# Patient Record
Sex: MALE | Race: WHITE | NOT HISPANIC OR LATINO | Employment: FULL TIME | URBAN - METROPOLITAN AREA
[De-identification: names, ages, dates, MRNs, and addresses within clinical notes are randomized per-mention and may not be internally consistent; named-entity substitution may affect disease eponyms.]

---

## 2017-01-02 ENCOUNTER — APPOINTMENT (OUTPATIENT)
Dept: URGENT CARE | Facility: MEDICAL CENTER | Age: 37
End: 2017-01-02
Payer: COMMERCIAL

## 2017-01-02 PROCEDURE — 99203 OFFICE O/P NEW LOW 30 MIN: CPT

## 2017-01-03 ENCOUNTER — ALLSCRIPTS OFFICE VISIT (OUTPATIENT)
Dept: OTHER | Facility: OTHER | Age: 37
End: 2017-01-03

## 2017-01-03 DIAGNOSIS — Z00.00 ENCOUNTER FOR GENERAL ADULT MEDICAL EXAMINATION WITHOUT ABNORMAL FINDINGS: ICD-10-CM

## 2017-02-17 ENCOUNTER — OFFICE VISIT (OUTPATIENT)
Dept: URGENT CARE | Facility: MEDICAL CENTER | Age: 37
End: 2017-02-17
Payer: COMMERCIAL

## 2017-02-17 PROCEDURE — 99214 OFFICE O/P EST MOD 30 MIN: CPT

## 2017-02-22 ENCOUNTER — ALLSCRIPTS OFFICE VISIT (OUTPATIENT)
Dept: OTHER | Facility: OTHER | Age: 37
End: 2017-02-22

## 2017-02-28 ENCOUNTER — GENERIC CONVERSION - ENCOUNTER (OUTPATIENT)
Dept: OTHER | Facility: OTHER | Age: 37
End: 2017-02-28

## 2017-04-23 ENCOUNTER — OFFICE VISIT (OUTPATIENT)
Dept: URGENT CARE | Facility: MEDICAL CENTER | Age: 37
End: 2017-04-23
Payer: COMMERCIAL

## 2017-04-23 PROCEDURE — S9083 URGENT CARE CENTER GLOBAL: HCPCS

## 2017-04-23 PROCEDURE — G0382 LEV 3 HOSP TYPE B ED VISIT: HCPCS

## 2017-09-17 ENCOUNTER — HOSPITAL ENCOUNTER (EMERGENCY)
Facility: HOSPITAL | Age: 37
Discharge: HOME | End: 2017-09-17
Attending: EMERGENCY MEDICINE | Admitting: EMERGENCY MEDICINE
Payer: COMMERCIAL

## 2017-09-17 VITALS
HEART RATE: 81 BPM | SYSTOLIC BLOOD PRESSURE: 131 MMHG | WEIGHT: 207 LBS | TEMPERATURE: 97.9 F | RESPIRATION RATE: 18 BRPM | DIASTOLIC BLOOD PRESSURE: 77 MMHG | OXYGEN SATURATION: 99 %

## 2017-09-17 DIAGNOSIS — N20.0 KIDNEY STONE: Primary | ICD-10-CM

## 2017-09-17 LAB
BACTERIA UR QL AUTO: ABNORMAL /HPF
BILIRUB UR QL STRIP: NEGATIVE
CLARITY UR: ABNORMAL
COLOR UR: YELLOW
GLUCOSE UR STRIP-MCNC: NEGATIVE MG/DL
HGB UR QL STRIP.AUTO: ABNORMAL
KETONES UR STRIP-MCNC: NEGATIVE MG/DL
LEUKOCYTE ESTERASE UR QL STRIP: NEGATIVE
NITRITE UR QL STRIP: NEGATIVE
NON-SQ EPI CELLS URNS QL MICRO: ABNORMAL /HPF
PH UR STRIP.AUTO: 6.5 [PH] (ref 4.5–8)
PROT UR STRIP-MCNC: NEGATIVE MG/DL
RBC #/AREA URNS AUTO: ABNORMAL /HPF
SP GR UR STRIP.AUTO: 1.01 (ref 1–1.03)
UROBILINOGEN UR QL STRIP.AUTO: 0.2 E.U./DL
WBC #/AREA URNS AUTO: ABNORMAL /HPF

## 2017-09-17 PROCEDURE — 81001 URINALYSIS AUTO W/SCOPE: CPT

## 2017-09-17 PROCEDURE — 81002 URINALYSIS NONAUTO W/O SCOPE: CPT | Performed by: EMERGENCY MEDICINE

## 2017-09-17 PROCEDURE — 99284 EMERGENCY DEPT VISIT MOD MDM: CPT

## 2017-09-17 RX ORDER — IBUPROFEN 400 MG/1
400 TABLET ORAL ONCE
Status: COMPLETED | OUTPATIENT
Start: 2017-09-17 | End: 2017-09-17

## 2017-09-17 RX ADMIN — IBUPROFEN 400 MG: 400 TABLET, FILM COATED ORAL at 10:29

## 2017-09-18 ENCOUNTER — GENERIC CONVERSION - ENCOUNTER (OUTPATIENT)
Dept: OTHER | Facility: OTHER | Age: 37
End: 2017-09-18

## 2017-09-18 ENCOUNTER — ALLSCRIPTS OFFICE VISIT (OUTPATIENT)
Dept: OTHER | Facility: OTHER | Age: 37
End: 2017-09-18

## 2017-09-18 ENCOUNTER — LAB REQUISITION (OUTPATIENT)
Dept: LAB | Facility: HOSPITAL | Age: 37
End: 2017-09-18
Payer: COMMERCIAL

## 2017-09-18 DIAGNOSIS — N20.0 CALCULUS OF KIDNEY: ICD-10-CM

## 2017-09-18 LAB
BILIRUB UR QL STRIP: NORMAL
CLARITY UR: NORMAL
COLOR UR: YELLOW
GLUCOSE (HISTORICAL): NORMAL
HGB UR QL STRIP.AUTO: NORMAL
KETONES UR STRIP-MCNC: NORMAL MG/DL
LEUKOCYTE ESTERASE UR QL STRIP: NORMAL
NITRITE UR QL STRIP: NORMAL
PH UR STRIP.AUTO: 7.5 [PH]
PROT UR STRIP-MCNC: NORMAL MG/DL
SP GR UR STRIP.AUTO: 1015
UROBILINOGEN UR QL STRIP.AUTO: 0.2

## 2017-09-18 PROCEDURE — 82360 CALCULUS ASSAY QUANT: CPT | Performed by: NURSE PRACTITIONER

## 2017-09-25 ENCOUNTER — APPOINTMENT (OUTPATIENT)
Dept: LAB | Facility: MEDICAL CENTER | Age: 37
End: 2017-09-25
Payer: COMMERCIAL

## 2017-09-25 ENCOUNTER — TRANSCRIBE ORDERS (OUTPATIENT)
Dept: ADMINISTRATIVE | Facility: HOSPITAL | Age: 37
End: 2017-09-25

## 2017-09-25 DIAGNOSIS — N20.0 CALCULUS OF KIDNEY: ICD-10-CM

## 2017-09-25 PROCEDURE — 82507 ASSAY OF CITRATE: CPT

## 2017-09-25 PROCEDURE — 84300 ASSAY OF URINE SODIUM: CPT

## 2017-09-25 PROCEDURE — 82131 AMINO ACIDS SINGLE QUANT: CPT

## 2017-09-25 PROCEDURE — 84560 ASSAY OF URINE/URIC ACID: CPT

## 2017-09-25 PROCEDURE — 82140 ASSAY OF AMMONIA: CPT

## 2017-09-25 PROCEDURE — 84133 ASSAY OF URINE POTASSIUM: CPT

## 2017-09-25 PROCEDURE — 82340 ASSAY OF CALCIUM IN URINE: CPT

## 2017-09-25 PROCEDURE — 84105 ASSAY OF URINE PHOSPHORUS: CPT

## 2017-09-25 PROCEDURE — 83735 ASSAY OF MAGNESIUM: CPT

## 2017-09-25 PROCEDURE — 83945 ASSAY OF OXALATE: CPT

## 2017-09-25 PROCEDURE — 82570 ASSAY OF URINE CREATININE: CPT

## 2017-09-25 PROCEDURE — 81003 URINALYSIS AUTO W/O SCOPE: CPT

## 2017-09-25 PROCEDURE — 83935 ASSAY OF URINE OSMOLALITY: CPT

## 2017-09-25 PROCEDURE — 82436 ASSAY OF URINE CHLORIDE: CPT

## 2017-09-25 PROCEDURE — 84392 ASSAY OF URINE SULFATE: CPT

## 2017-09-30 LAB
CA PHOS MFR STONE: 3 %
COLOR STONE: NORMAL
COM MFR STONE: 97 %
COMMENT-STONE3: NORMAL
COMPOSITION: NORMAL
LABORATORY COMMENT REPORT: NORMAL
NIDUS STONE QL: NORMAL
PHOTO: NORMAL
SIZE STONE: NORMAL MM
STONE ANALYSIS-IMP: NORMAL
WT STONE: 20 MG

## 2017-10-05 LAB
AMMONIA 24H UR-MRATE: 15 MEQ/24 HR
AMMONIA UR-SCNC: ABNORMAL UG/DL
CA H2 PHOS DIHYD CRY URNS QL MICRO: 0.21 RATIO (ref 0–3)
CALCIUM 24H UR-MCNC: 2.6 MG/DL
CALCIUM 24H UR-MRATE: 55.9 MG/24 HR (ref 100–300)
CHLORIDE 24H UR-SCNC: <20 MMOL/L
CHLORIDE 24H UR-SRATE: <43 MMOL/24 HR (ref 110–250)
CITRATE 24H UR-MCNC: 93 MG/L
CITRATE 24H UR-MRATE: 200 MG/24 HR (ref 320–1240)
COM CRY STONE QL IR: 1.38 RATIO (ref 0–6)
CREAT 24H UR-MCNC: 39.1 MG/DL
CREAT 24H UR-MRATE: 840.7 MG/24 HR (ref 1000–2000)
CYSTINE 24H UR-MCNC: 3.25 MG/L
CYSTINE 24H UR-MRATE: 6.99 MG/24 HR (ref 10–100)
MAGNESIUM 24H UR-MRATE: 45 MG/24 HR (ref 12–293)
MAGNESIUM UR-MCNC: 2.1 MG/DL
NA URATE CRY STONE QL IR: 0.36 RATIO (ref 0–4)
OSMOLALITY UR: 176 MOSMOL/KG (ref 300–900)
OXALATE 24H UR-MRATE: 19 MG/24 HR (ref 7–44)
OXALATE UR-MCNC: 9 MG/L
PH 24H UR: 6.3 [PH]
PHOSPHATE 24H UR-MCNC: 14.5 MG/DL
PHOSPHATE 24H UR-MRATE: 311.8 MG/24 HR (ref 400–1300)
PLEASE NOTE (STONE RISK): ABNORMAL
POTASSIUM 24H UR-SCNC: 40 MMOL/24 HR (ref 25–125)
POTASSIUM UR-SCNC: 18.6 MMOL/L
PRESERVED URINE: 2150 ML/24 HR (ref 800–1800)
SODIUM 24H UR-SCNC: <20 MMOL/L
SODIUM 24H UR-SRATE: <43 MMOL/24 HR (ref 58–337)
SPECIMEN VOL 24H UR: 2150 ML/24 HR (ref 800–1800)
SULFATE 24H UR-MCNC: 15 MEQ/24 HR (ref 0–30)
SULFATE UR-MCNC: 7 MEQ/L
TRI-PHOS CRY STONE MICRO: 0.01 RATIO (ref 0–1)
URATE 24H UR-MCNC: 15.9 MG/DL
URATE 24H UR-MRATE: 342 MG/24 HR (ref 250–750)
URATE DIHYD CRY STONE QL IR: 0.36 RATIO (ref 0–1.2)

## 2017-11-01 ENCOUNTER — ALLSCRIPTS OFFICE VISIT (OUTPATIENT)
Dept: OTHER | Facility: OTHER | Age: 37
End: 2017-11-01

## 2017-11-01 LAB
BILIRUB UR QL STRIP: NORMAL
CLARITY UR: NORMAL
COLOR UR: YELLOW
GLUCOSE (HISTORICAL): NORMAL
HGB UR QL STRIP.AUTO: NORMAL
KETONES UR STRIP-MCNC: NORMAL MG/DL
LEUKOCYTE ESTERASE UR QL STRIP: NORMAL
NITRITE UR QL STRIP: NORMAL
PH UR STRIP.AUTO: 7 [PH]
PROT UR STRIP-MCNC: NORMAL MG/DL
SP GR UR STRIP.AUTO: 1.01
UROBILINOGEN UR QL STRIP.AUTO: 0.2

## 2018-01-12 ENCOUNTER — ALLSCRIPTS OFFICE VISIT (OUTPATIENT)
Dept: OTHER | Facility: OTHER | Age: 38
End: 2018-01-12

## 2018-01-12 ENCOUNTER — GENERIC CONVERSION - ENCOUNTER (OUTPATIENT)
Dept: OTHER | Facility: OTHER | Age: 38
End: 2018-01-12

## 2018-01-12 ENCOUNTER — LAB REQUISITION (OUTPATIENT)
Dept: LAB | Facility: HOSPITAL | Age: 38
End: 2018-01-12
Payer: COMMERCIAL

## 2018-01-12 DIAGNOSIS — Z00.00 ENCOUNTER FOR GENERAL ADULT MEDICAL EXAMINATION WITHOUT ABNORMAL FINDINGS: ICD-10-CM

## 2018-01-12 DIAGNOSIS — E78.5 HYPERLIPIDEMIA: ICD-10-CM

## 2018-01-12 LAB
ALBUMIN SERPL BCP-MCNC: 4.7 G/DL (ref 3.5–5)
ALP SERPL-CCNC: 79 U/L (ref 46–116)
ALT SERPL W P-5'-P-CCNC: 58 U/L (ref 12–78)
ANION GAP SERPL CALCULATED.3IONS-SCNC: 9 MMOL/L (ref 4–13)
AST SERPL W P-5'-P-CCNC: 29 U/L (ref 5–45)
BASOPHILS # BLD AUTO: 0.02 THOUSANDS/ΜL (ref 0–0.1)
BASOPHILS NFR BLD AUTO: 0 % (ref 0–1)
BILIRUB SERPL-MCNC: 0.85 MG/DL (ref 0.2–1)
BUN SERPL-MCNC: 14 MG/DL (ref 5–25)
CALCIUM SERPL-MCNC: 9.6 MG/DL (ref 8.3–10.1)
CHLORIDE SERPL-SCNC: 102 MMOL/L (ref 100–108)
CHOLEST SERPL-MCNC: 217 MG/DL (ref 50–200)
CO2 SERPL-SCNC: 26 MMOL/L (ref 21–32)
CREAT SERPL-MCNC: 0.9 MG/DL (ref 0.6–1.3)
EOSINOPHIL # BLD AUTO: 0.1 THOUSAND/ΜL (ref 0–0.61)
EOSINOPHIL NFR BLD AUTO: 2 % (ref 0–6)
ERYTHROCYTE [DISTWIDTH] IN BLOOD BY AUTOMATED COUNT: 12.7 % (ref 11.6–15.1)
GFR SERPL CREATININE-BSD FRML MDRD: 109 ML/MIN/1.73SQ M
GLUCOSE SERPL-MCNC: 76 MG/DL (ref 65–140)
HCT VFR BLD AUTO: 47.5 % (ref 36.5–49.3)
HDLC SERPL-MCNC: 51 MG/DL (ref 40–60)
HGB BLD-MCNC: 16.5 G/DL (ref 12–17)
LDLC SERPL CALC-MCNC: 135 MG/DL (ref 0–100)
LYMPHOCYTES # BLD AUTO: 2.12 THOUSANDS/ΜL (ref 0.6–4.47)
LYMPHOCYTES NFR BLD AUTO: 33 % (ref 14–44)
MCH RBC QN AUTO: 29.9 PG (ref 26.8–34.3)
MCHC RBC AUTO-ENTMCNC: 34.7 G/DL (ref 31.4–37.4)
MCV RBC AUTO: 86 FL (ref 82–98)
MONOCYTES # BLD AUTO: 0.62 THOUSAND/ΜL (ref 0.17–1.22)
MONOCYTES NFR BLD AUTO: 10 % (ref 4–12)
NEUTROPHILS # BLD AUTO: 3.54 THOUSANDS/ΜL (ref 1.85–7.62)
NEUTS SEG NFR BLD AUTO: 55 % (ref 43–75)
NRBC BLD AUTO-RTO: 0 /100 WBCS
PLATELET # BLD AUTO: 295 THOUSANDS/UL (ref 149–390)
PMV BLD AUTO: 11.3 FL (ref 8.9–12.7)
POTASSIUM SERPL-SCNC: 4.5 MMOL/L (ref 3.5–5.3)
PROT SERPL-MCNC: 7.6 G/DL (ref 6.4–8.2)
RBC # BLD AUTO: 5.51 MILLION/UL (ref 3.88–5.62)
SODIUM SERPL-SCNC: 137 MMOL/L (ref 136–145)
TRIGL SERPL-MCNC: 154 MG/DL
TSH SERPL DL<=0.05 MIU/L-ACNC: 1.5 UIU/ML (ref 0.36–3.74)
WBC # BLD AUTO: 6.41 THOUSAND/UL (ref 4.31–10.16)

## 2018-01-12 PROCEDURE — 80053 COMPREHEN METABOLIC PANEL: CPT | Performed by: FAMILY MEDICINE

## 2018-01-12 PROCEDURE — 84443 ASSAY THYROID STIM HORMONE: CPT | Performed by: FAMILY MEDICINE

## 2018-01-12 PROCEDURE — 80061 LIPID PANEL: CPT | Performed by: FAMILY MEDICINE

## 2018-01-12 PROCEDURE — 85025 COMPLETE CBC W/AUTO DIFF WBC: CPT | Performed by: FAMILY MEDICINE

## 2018-01-13 ENCOUNTER — GENERIC CONVERSION - ENCOUNTER (OUTPATIENT)
Dept: OTHER | Facility: OTHER | Age: 38
End: 2018-01-13

## 2018-01-13 VITALS
SYSTOLIC BLOOD PRESSURE: 122 MMHG | HEART RATE: 76 BPM | RESPIRATION RATE: 16 BRPM | BODY MASS INDEX: 31.5 KG/M2 | TEMPERATURE: 97.7 F | WEIGHT: 220 LBS | HEIGHT: 70 IN | DIASTOLIC BLOOD PRESSURE: 84 MMHG

## 2018-01-13 VITALS
SYSTOLIC BLOOD PRESSURE: 110 MMHG | DIASTOLIC BLOOD PRESSURE: 70 MMHG | HEIGHT: 74 IN | BODY MASS INDEX: 26.56 KG/M2 | WEIGHT: 207 LBS

## 2018-01-14 VITALS
WEIGHT: 208 LBS | SYSTOLIC BLOOD PRESSURE: 110 MMHG | HEIGHT: 74 IN | BODY MASS INDEX: 26.69 KG/M2 | DIASTOLIC BLOOD PRESSURE: 76 MMHG

## 2018-01-15 VITALS
RESPIRATION RATE: 16 BRPM | BODY MASS INDEX: 30.92 KG/M2 | DIASTOLIC BLOOD PRESSURE: 82 MMHG | TEMPERATURE: 97.3 F | HEIGHT: 70 IN | SYSTOLIC BLOOD PRESSURE: 122 MMHG | WEIGHT: 216 LBS | HEART RATE: 92 BPM

## 2018-01-16 NOTE — MISCELLANEOUS
Message   Recorded as Task   Date: 09/18/2017 08:16 AM, Created By: Albin Thomas   Task Name: Medical Complaint Callback   Assigned To: Ernesto CARPENTER,TEAM   Regarding Patient: Xuan Pan, Status: In Progress   Comment:    Yamilka Coleman - 18 Sep 2017 8:16 AM     TASK CREATED  Caller: Self; [phone] (Home); [phone] (Work)  Pt seen 09/17/17 [facility] ER Victor stone pain calling for appointment   Sharonda Stanley - 18 Sep 2017 8:36 AM     TASK EDITED  LMOM FOR PT TO RETURN CALL  NEEDS APPT WITH LORENZO WAGNER TODAY  Sharonda Stanley - 18 Sep 2017 8:37 AM     TASK IN PROGRESS   Sharonda Stanley - 18 Sep 2017 9:13 AM     TASK EDITED  PT PAIN FREE AT THIS TIME  PASSED STONE IN ER  APPT TODAY WITH LORENZO WAGNER FOR ER F/U  Active Problems    1  Acute bronchitis (466 0) (J20 9)   2  Acute pharyngitis (462) (J02 9)   3  Acute suppurative otitis media (382 00) (H66 009)   4  Contact dermatitis (692 9) (L25 9)   5  Cough (786 2) (R05)   6  Viral disease (079 99) (B34 9)    Current Meds   1  No Reported Medications  Requested for: 23Apr2017 Recorded    Allergies    1   No Known Drug Allergies    Signatures   Electronically signed by : Oriana Lange RN; Sep 18 2017  9:14AM EST                       (Author)

## 2018-01-24 VITALS — BODY MASS INDEX: 32.29 KG/M2 | HEIGHT: 69 IN | WEIGHT: 218 LBS | TEMPERATURE: 97.7 F

## 2018-01-24 VITALS — HEART RATE: 72 BPM | DIASTOLIC BLOOD PRESSURE: 80 MMHG | RESPIRATION RATE: 16 BRPM | SYSTOLIC BLOOD PRESSURE: 118 MMHG

## 2018-02-22 ENCOUNTER — ANESTHESIA EVENT (OUTPATIENT)
Dept: PERIOP | Facility: HOSPITAL | Age: 38
End: 2018-02-22
Payer: COMMERCIAL

## 2018-02-23 ENCOUNTER — HOSPITAL ENCOUNTER (OUTPATIENT)
Facility: HOSPITAL | Age: 38
Setting detail: OUTPATIENT SURGERY
Discharge: HOME | End: 2018-02-23
Attending: PLASTIC SURGERY | Admitting: PLASTIC SURGERY
Payer: COMMERCIAL

## 2018-02-23 ENCOUNTER — ANESTHESIA (OUTPATIENT)
Dept: PERIOP | Facility: HOSPITAL | Age: 38
End: 2018-02-23
Payer: COMMERCIAL

## 2018-02-23 VITALS
DIASTOLIC BLOOD PRESSURE: 57 MMHG | OXYGEN SATURATION: 100 % | TEMPERATURE: 98.5 F | RESPIRATION RATE: 15 BRPM | HEIGHT: 70 IN | HEART RATE: 78 BPM | BODY MASS INDEX: 30.06 KG/M2 | WEIGHT: 210 LBS | SYSTOLIC BLOOD PRESSURE: 155 MMHG

## 2018-02-23 DIAGNOSIS — R22.9 LOCALIZED SWELLING, MASS AND LUMP, UNSPECIFIED: ICD-10-CM

## 2018-02-23 PROCEDURE — 88304 TISSUE EXAM BY PATHOLOGIST: CPT | Performed by: PATHOLOGY

## 2018-02-23 PROCEDURE — 88304 TISSUE EXAM BY PATHOLOGIST: CPT | Performed by: PLASTIC SURGERY

## 2018-02-23 RX ORDER — LIDOCAINE HYDROCHLORIDE AND EPINEPHRINE 10; 10 MG/ML; UG/ML
INJECTION, SOLUTION INFILTRATION; PERINEURAL AS NEEDED
Status: DISCONTINUED | OUTPATIENT
Start: 2018-02-23 | End: 2018-02-23 | Stop reason: HOSPADM

## 2018-02-23 RX ORDER — BUPIVACAINE HYDROCHLORIDE AND EPINEPHRINE 2.5; 5 MG/ML; UG/ML
INJECTION, SOLUTION EPIDURAL; INFILTRATION; INTRACAUDAL; PERINEURAL AS NEEDED
Status: DISCONTINUED | OUTPATIENT
Start: 2018-02-23 | End: 2018-02-23 | Stop reason: HOSPADM

## 2018-02-23 RX ORDER — ONDANSETRON 2 MG/ML
INJECTION INTRAMUSCULAR; INTRAVENOUS AS NEEDED
Status: DISCONTINUED | OUTPATIENT
Start: 2018-02-23 | End: 2018-02-23 | Stop reason: SURG

## 2018-02-23 RX ORDER — FENTANYL CITRATE/PF 50 MCG/ML
50 SYRINGE (ML) INJECTION
Status: DISCONTINUED | OUTPATIENT
Start: 2018-02-23 | End: 2018-02-23 | Stop reason: HOSPADM

## 2018-02-23 RX ORDER — MEPERIDINE HYDROCHLORIDE 50 MG/ML
12.5 INJECTION INTRAMUSCULAR; INTRAVENOUS; SUBCUTANEOUS AS NEEDED
Status: DISCONTINUED | OUTPATIENT
Start: 2018-02-23 | End: 2018-02-23 | Stop reason: HOSPADM

## 2018-02-23 RX ORDER — AMOXICILLIN 500 MG
1 CAPSULE ORAL DAILY
COMMUNITY
End: 2018-02-23 | Stop reason: HOSPADM

## 2018-02-23 RX ORDER — SODIUM CHLORIDE 9 MG/ML
125 INJECTION, SOLUTION INTRAVENOUS CONTINUOUS
Status: DISCONTINUED | OUTPATIENT
Start: 2018-02-23 | End: 2018-02-23 | Stop reason: HOSPADM

## 2018-02-23 RX ORDER — MAGNESIUM HYDROXIDE 1200 MG/15ML
LIQUID ORAL AS NEEDED
Status: DISCONTINUED | OUTPATIENT
Start: 2018-02-23 | End: 2018-02-23 | Stop reason: HOSPADM

## 2018-02-23 RX ORDER — HYDROCODONE BITARTRATE AND ACETAMINOPHEN 5; 325 MG/1; MG/1
1 TABLET ORAL EVERY 4 HOURS PRN
Status: DISCONTINUED | OUTPATIENT
Start: 2018-02-23 | End: 2018-02-23 | Stop reason: HOSPADM

## 2018-02-23 RX ORDER — FENTANYL CITRATE 50 UG/ML
INJECTION, SOLUTION INTRAMUSCULAR; INTRAVENOUS AS NEEDED
Status: DISCONTINUED | OUTPATIENT
Start: 2018-02-23 | End: 2018-02-23 | Stop reason: SURG

## 2018-02-23 RX ORDER — MIDAZOLAM HYDROCHLORIDE 1 MG/ML
INJECTION INTRAMUSCULAR; INTRAVENOUS AS NEEDED
Status: DISCONTINUED | OUTPATIENT
Start: 2018-02-23 | End: 2018-02-23 | Stop reason: SURG

## 2018-02-23 RX ORDER — ALBUTEROL SULFATE 2.5 MG/3ML
2.5 SOLUTION RESPIRATORY (INHALATION) ONCE AS NEEDED
Status: DISCONTINUED | OUTPATIENT
Start: 2018-02-23 | End: 2018-02-23 | Stop reason: HOSPADM

## 2018-02-23 RX ORDER — MULTIVITAMIN
1 TABLET ORAL DAILY
COMMUNITY

## 2018-02-23 RX ORDER — SCOLOPAMINE TRANSDERMAL SYSTEM 1 MG/1
1 PATCH, EXTENDED RELEASE TRANSDERMAL
Status: DISCONTINUED | OUTPATIENT
Start: 2018-02-23 | End: 2018-02-23 | Stop reason: HOSPADM

## 2018-02-23 RX ORDER — PROPOFOL 10 MG/ML
INJECTION, EMULSION INTRAVENOUS CONTINUOUS PRN
Status: DISCONTINUED | OUTPATIENT
Start: 2018-02-23 | End: 2018-02-23 | Stop reason: SURG

## 2018-02-23 RX ORDER — PROPOFOL 10 MG/ML
INJECTION, EMULSION INTRAVENOUS AS NEEDED
Status: DISCONTINUED | OUTPATIENT
Start: 2018-02-23 | End: 2018-02-23 | Stop reason: SURG

## 2018-02-23 RX ADMIN — PROPOFOL 40 MG: 10 INJECTION, EMULSION INTRAVENOUS at 13:07

## 2018-02-23 RX ADMIN — MIDAZOLAM HYDROCHLORIDE 2 MG: 1 INJECTION, SOLUTION INTRAMUSCULAR; INTRAVENOUS at 13:04

## 2018-02-23 RX ADMIN — PROPOFOL 50 MCG/KG/MIN: 10 INJECTION, EMULSION INTRAVENOUS at 13:07

## 2018-02-23 RX ADMIN — SODIUM CHLORIDE: 0.9 INJECTION, SOLUTION INTRAVENOUS at 13:21

## 2018-02-23 RX ADMIN — MIDAZOLAM HYDROCHLORIDE 2 MG: 1 INJECTION, SOLUTION INTRAMUSCULAR; INTRAVENOUS at 12:59

## 2018-02-23 RX ADMIN — CEFAZOLIN SODIUM 2000 MG: 1 SOLUTION INTRAVENOUS at 13:09

## 2018-02-23 RX ADMIN — SODIUM CHLORIDE 125 ML/HR: 0.9 INJECTION, SOLUTION INTRAVENOUS at 12:21

## 2018-02-23 RX ADMIN — ONDANSETRON HYDROCHLORIDE 4 MG: 2 INJECTION, SOLUTION INTRAVENOUS at 13:18

## 2018-02-23 RX ADMIN — FENTANYL CITRATE 100 MCG: 50 INJECTION, SOLUTION INTRAMUSCULAR; INTRAVENOUS at 13:04

## 2018-02-23 RX ADMIN — SCOPALAMINE 1 PATCH: 1 PATCH, EXTENDED RELEASE TRANSDERMAL at 12:06

## 2018-02-23 NOTE — PROGRESS NOTES
D/C instructions reviewed w/ pt & spouse, verbalized understanding  Small incision at L base of scalp remain CD&I, closed w/ hystocryl & KIERA  Pt given gauze for home use, to wear headband at night w/ gauze  Denies pain, slight pressure when ambulated but tolerable

## 2018-02-23 NOTE — DISCHARGE SUMMARY
Discharge Summary - Medical Walker Frank 40 y o  male MRN: [id number]    [address] [address] [facility] OR MAIN Room / Bed: OR POOL/OR POOL Encounter: [id number]    BRIEF OVERVIEW  Admitting Provider: Celestine Burden MD  Discharge Provider: Celestine Burden MD  Primary Care Physician at Discharge: Hermilo Pizarro    Discharge To: Home      Admission Date: 2/23/2018     Discharge Date: No discharge date for patient encounter  Code Status: No Order  Advance Directive and Living Will: <no information>  Power of :        Primary Discharge Diagnosis  Active Problems:    * No active hospital problems  *  Resolved Problems:    * No resolved hospital problems   *        Discharge Disposition: [address] [place] [address]    Presenting Problem/History of Present Illness  <principal problem not specified>      Discharge Condition: stable    Patient tolerated the procedure well, recovered in PACU and was discharged home in stable condition    Celestine Burden MD  2/23/2018  1:05 PM

## 2018-02-23 NOTE — ANESTHESIA PREPROCEDURE EVALUATION
Review of Systems/Medical History      History of anesthetic complications PONV    Cardiovascular  Exercise tolerance: good,     Pulmonary  Negative pulmonary ROS Sleep apnea (not formally diagnosed) ,        GI/Hepatic  Negative GI/hepatic ROS          Negative  ROS        Endo/Other  Negative endo/other ROS      GYN  Negative gynecology ROS          Hematology  Negative hematology ROS      Musculoskeletal  Negative musculoskeletal ROS        Neurology  Negative neurology ROS      Psychology   Negative psychology ROS              Physical Exam    Airway    Mallampati score: II  TM Distance: >3 FB  Neck ROM: full     Dental   No notable dental hx     Cardiovascular  Rhythm: regular, Rate: normal,     Pulmonary  Breath sounds clear to auscultation,     Other Findings  Full beard      Anesthesia Plan  ASA Score- 2     Anesthesia Type- IV sedation with anesthesia and general with ASA Monitors  Additional Monitors:   Airway Plan:         Plan Factors-    Induction- intravenous  Postoperative Plan-     Informed Consent- Anesthetic plan and risks discussed with patient

## 2018-02-23 NOTE — OP NOTE
OPERATIVE REPORT  PATIENT NAME: Xena Rosen    :  1980  MRN: [id number]  Pt Location: [facility] OR ROOM 05    SURGERY DATE: 2018    Surgeon(s) and Role:     Armand Lauren MD - Primary    Preop Diagnosis:  Localized swelling, mass and lump, unspecified [R22 9]    Post-Op Diagnosis Codes:     * Localized swelling, mass and lump, unspecified [R22 9]    Procedure(s) (LRB):  EXCISION SCALP MASS (N/A)    Specimen(s):  ID Type Source Tests Collected by Time Destination   1 : Scalp mass Tissue Mass TISSUE Lore Stevenson MD 2018 1332        Estimated Blood Loss:   Minimal    Drains:       Anesthesia Type:   IV Sedation with Anesthesia    Operative Indications:  Localized swelling, mass and lump, unspecified [R22 9]      Operative Findings:      Complications:   None    Procedure and Technique:  The patient was marked and the area of concern was confirmed with the patient in the preoperative holding area prior to anesthesia  The patient brought to the operating room and placed prone on the operating table  Time-out procedure was performed SCDs were applied and IV antibiotics were given  After adequate anesthesia was obtained the posterior scalp was prepped and draped in standard surgical technique  A transverse incision was made over the mass after 1% lidocaine with epinephrine was injected  Dissection was carried down deep to the galea at which point a fatty mass was noted  This was circumferentially dissected free and sent for final pathology  The mass measured 2 5 cm  The total incision length was 3 3 cm  Excellent hemostasis was achieved  The galea and deep dermis were closed using 4-0 PDS suture in interrupted technique  The superficial skin was closed using 4-0 Monocryl suture running subcuticular technique  Skin glue was applied     I was present for the entire procedure and A qualified resident physician was not available    Patient Disposition:  hemodynamically stable    SIGNATURE: Mehreen Rodriguez  MD  DATE: February 23, 2018  TIME: 3:33 PM

## 2018-02-23 NOTE — DISCHARGE INSTRUCTIONS
[address], [address] [facility], [address] [facility], [facility], [address] [facility] /I / [employer]       Avoid direct sunlight    No ice or heating pack    Ok to shower, avoid direct water pressure on incision    Wear a headband/sweat to hold gauze over the incision at night for 1 week    No strenuous activity    Skin glue was applied     Call [phone] for an appointment in 7-10 days

## 2018-02-23 NOTE — ANESTHESIA POSTPROCEDURE EVALUATION
Post-Op Assessment Note      CV Status:  Stable    Mental Status:  Alert and awake    Hydration Status:  Euvolemic    PONV Controlled:  Controlled    Airway Patency:  Patent    Post Op Vitals Reviewed: Yes          Staff: Anesthesiologist           /69 (02/23/18 1352)    Temp 98 3 °F (36 8 °C) (02/23/18 1352)    Pulse 88 (02/23/18 1352)   Resp 13 (02/23/18 1352)    SpO2 99 % (02/23/18 1352)

## 2018-02-26 ENCOUNTER — OFFICE VISIT (OUTPATIENT)
Dept: FAMILY MEDICINE CLINIC | Facility: CLINIC | Age: 38
End: 2018-02-26
Payer: COMMERCIAL

## 2018-02-26 VITALS
RESPIRATION RATE: 16 BRPM | WEIGHT: 215 LBS | HEART RATE: 84 BPM | TEMPERATURE: 98.3 F | BODY MASS INDEX: 30.78 KG/M2 | DIASTOLIC BLOOD PRESSURE: 86 MMHG | HEIGHT: 70 IN | SYSTOLIC BLOOD PRESSURE: 120 MMHG

## 2018-02-26 DIAGNOSIS — J06.9 UPPER RESPIRATORY TRACT INFECTION, UNSPECIFIED TYPE: Primary | ICD-10-CM

## 2018-02-26 PROCEDURE — 99213 OFFICE O/P EST LOW 20 MIN: CPT | Performed by: FAMILY MEDICINE

## 2018-02-26 RX ORDER — CALCIUM CARBONATE/VITAMIN D3 600 MG-10
TABLET ORAL
COMMUNITY

## 2018-02-26 RX ORDER — BENZONATATE 200 MG/1
200 CAPSULE ORAL 3 TIMES DAILY PRN
Qty: 20 CAPSULE | Refills: 0 | Status: SHIPPED | OUTPATIENT
Start: 2018-02-26 | End: 2018-08-31

## 2018-02-26 NOTE — PATIENT INSTRUCTIONS
Finish cephalexin as prescribed  Will tried Tessalon Perles 200 mg 1 3 times a day as needed for cough  Recommend increase fluids and rest   Return to the office in 1 week or call sooner as needed

## 2018-02-26 NOTE — PROGRESS NOTES
Assessment/Plan:  Discussed treatment options with patient  Patient will continue cephalexin 500 mg q i d  and complete 10 day course  Patient will try Tessalon Perles 200 mg 1 t i d  p r n  Dara Soulier Patient encouraged to drink plenty of fluids and rest   Return to the office in 1 week or sooner p r n  No problem-specific Assessment & Plan notes found for this encounter  Diagnoses and all orders for this visit:    Upper respiratory tract infection, unspecified type  Comments:  Finish cephalexin  Will try Tessalon Perles 200 mg t i d  p r n   Increased fluids and rest   Orders:  -     benzonatate (TESSALON) 200 MG capsule; Take 1 capsule (200 mg total) by mouth 3 (three) times a day as needed for cough    Other orders  -     Omega 3 1200 MG CAPS; Take by mouth  -     CEPHALEXIN PO; Take by mouth 4 (four) times a day          Subjective:      Patient ID: Charmayne Houston is a 40 y o  male  Patient started 3 days ago with nasal congestion, postnasal drainage, sore throat and nonproductive cough  Patient denies any fever  Patient was started on cephalexin 500 mg q i d  3 days ago by Dr Jeremiah Membreno, plastic surgeon secondary to excision of subcutaneous mass from his scalp  Patient has been taking Delsym without significant relief  Patient did receive yearly flu vaccine  Cough   This is a new problem  The current episode started in the past 7 days  The problem has been gradually worsening  The cough is non-productive  Associated symptoms include myalgias, nasal congestion, postnasal drip, a sore throat, sweats and wheezing  Pertinent negatives include no chest pain, chills, ear congestion, ear pain, fever, headaches, hemoptysis, rash, rhinorrhea or shortness of breath  He has tried OTC cough suppressant for the symptoms  The treatment provided mild relief  There is no history of asthma, COPD or pneumonia         The following portions of the patient's history were reviewed and updated as appropriate: allergies, current medications, past family history, past medical history, past social history, past surgical history and problem list     Review of Systems   Constitutional: Negative for chills and fever  HENT: Positive for postnasal drip and sore throat  Negative for ear pain and rhinorrhea  Respiratory: Positive for cough and wheezing  Negative for hemoptysis and shortness of breath  Cardiovascular: Negative for chest pain  Musculoskeletal: Positive for myalgias  Skin: Negative for rash  Neurological: Negative for headaches  Objective:      /86   Pulse 84   Temp 98 3 °F (36 8 °C)   Resp 16   Ht 5' 10" (1 778 m)   Wt 97 5 kg (215 lb)   BMI 30 85 kg/m²          Physical Exam   Constitutional: He is oriented to person, place, and time  He appears well-developed and well-nourished  No distress  HENT:   Head: Normocephalic  Right Ear: External ear normal    Left Ear: External ear normal    Positive turbinates swelling with mucoid drainage  Throat positive postnasal drainage and injected  Mucous membranes moist    Eyes: Conjunctivae are normal    Neck: Neck supple  Cardiovascular: Normal rate and regular rhythm  Pulmonary/Chest: Effort normal and breath sounds normal  He has no wheezes  Abdominal: Soft  There is no tenderness  Musculoskeletal: He exhibits no edema  Lymphadenopathy:     He has no cervical adenopathy  Neurological: He is alert and oriented to person, place, and time  Skin: Skin is warm and dry  Scalp incision in the occipital area healing well without signs of infection  Negative erythema, negative swelling and negative drainage  Psychiatric: He has a normal mood and affect

## 2018-02-26 NOTE — RESULT NOTES
Discussion/Summary   Labs ok, Chol mildly elevated  Cont present Tx and follow low chol diet  Verified Results  (1) CBC/PLT/DIFF [id number] 12:25PM Alan Moran Order Number: [id number]     Test Name Result Flag Reference   WBC COUNT 6 41 Thousand/uL  4 31-10 16   RBC COUNT 5 51 Million/uL  3 88-5 62   HEMOGLOBIN 16 5 g/dL  12 0-17 0   HEMATOCRIT 47 5 %  36 5-49 3   MCV 86 fL  82-98   MCH 29 9 pg  26 8-34 3   MCHC 34 7 g/dL  31 4-37 4   RDW 12 7 %  11 6-15 1   MPV 11 3 fL  8 9-12 7   PLATELET COUNT 475 Thousands/uL  149-390   nRBC AUTOMATED 0 /100 WBCs     NEUTROPHILS RELATIVE PERCENT 55 %  43-75   LYMPHOCYTES RELATIVE PERCENT 33 %  14-44   MONOCYTES RELATIVE PERCENT 10 %  4-12   EOSINOPHILS RELATIVE PERCENT 2 %  0-6   BASOPHILS RELATIVE PERCENT 0 %  0-1   NEUTROPHILS ABSOLUTE COUNT 3 54 Thousands/? ??L  1 85-7 62   LYMPHOCYTES ABSOLUTE COUNT 2 12 Thousands/? ??L  0 60-4 47   MONOCYTES ABSOLUTE COUNT 0 62 Thousand/? ??L  0 17-1 22   EOSINOPHILS ABSOLUTE COUNT 0 10 Thousand/? ??L  0 00-0 61   BASOPHILS ABSOLUTE COUNT 0 02 Thousands/? ??L  0 00-0 10     (1) COMPREHENSIVE METABOLIC PANEL [id number] 49:78ZA Alan Moran Order Number: [id number]     Test Name Result Flag Reference   GLUCOSE,RANDM 76 mg/dL     If the patient is fasting, the ADA then defines impaired fasting glucose as > 100 mg/dL and diabetes as > or equal to 123 mg/dL  Specimen collection should occur prior to Sulfasalazine administration due to the potential for falsely depressed results  Specimen collection should occur prior to Sulfapyridine administration due to the potential for falsely elevated results     SODIUM 137 mmol/L  136-145   POTASSIUM 4 5 mmol/L  3 5-5 3   CHLORIDE 102 mmol/L  100-108   CARBON DIOXIDE 26 mmol/L  21-32   ANION GAP (CALC) 9 mmol/L  4-13   BLOOD UREA NITROGEN 14 mg/dL  5-25   CREATININE 0 90 mg/dL  0 60-1 30   Standardized to IDMS reference method   CALCIUM 9 6 mg/dL  8 3-10 1 BILI, TOTAL 0 85 mg/dL  0 20-1 00   ALK PHOSPHATAS 79 U/L     ALT (SGPT) 58 U/L  12-78   Specimen collection should occur prior to Sulfasalazine and/or Sulfapyridine administration due to the potential for falsely depressed results  AST(SGOT) 29 U/L  5-45   Specimen collection should occur prior to Sulfasalazine administration due to the potential for falsely depressed results  ALBUMIN 4 7 g/dL  3 5-5 0   TOTAL PROTEIN 7 6 g/dL  6 4-8 2   eGFR 109 ml/min/1 73sq [facility] Disease Education Program recommendations are as follows:  GFR calculation is accurate only with a steady state creatinine  Chronic Kidney disease less than 60 ml/min/1 73 sq  meters  Kidney failure less than 15 ml/min/1 73 sq  meters  (1) LIPID PANEL, FASTING 12Jan2018 12:25PM [employer] Order Number: [id number]     Test Name Result Flag Reference   CHOLESTEROL 217 mg/dL H    Cholesterol:    Desirable <200 mg/dl    Borderline 200-239 mg/dl    High>239   HDL,DIRECT 51 mg/dL  40-60   HDL Cholesterol:    High>59 mg/dL    Low <41 mg/dL   LDL CHOLESTEROL CALCULATED 135 mg/dL H 0-100   This screening LDL is a calculated result  It does not have the accuracy of the Direct Measured LDL in the monitoring of patients with hyperlipidemia and/or statin therapy  Direct Measure LDL (AWK869) must be ordered separately in these patients  Triglyceride:        Normal <150 mg/dl   Borderline High 150-199 mg/dl   High 200-499 mg/dl   Very High >499 mg/dl   TRIGLYCERIDES 154 mg/dL H <=150   Specimen collection should occur prior to N-Acetylcysteine or Metamizole administration due to the potential for falsely depressed results       (1) TSH WITH FT4 REFLEX 12Jan2018 12:25PM [employer] Order Number: [id number]     Test Name Result Flag Reference   TSH 1 500 uIU/mL  0 358-3 740   Patients undergoing fluorescein dye angiography may retain small amounts of fluorescein in the body for 48-72 hours post procedure  Samples containing fluorescein can produce falsely depressed TSH values  If the patient had this procedure,a specimen should be resubmitted post fluorescein clearance

## 2018-02-26 NOTE — PROGRESS NOTES
Assessment    1  Laboratory exam ordered as part of routine general medical examination (V72 62)   (Z00 00)   2  Encounter for preventive health examination (V70 0) (Z00 00)   3  Subcutaneous mass (782 2) (R22 9)    Plan  Health Maintenance, Hyperlipidemia, Laboratory exam ordered as part of routine  general medical examination    · (1) CBC/PLT/DIFF; Status:Hold For - Exact Date; Requested for: In Office Collection;    · (1) COMPREHENSIVE METABOLIC PANEL; Status:Hold For - Exact Date; Requested  for: In Office Collection;    · (1) LIPID PANEL, FASTING; Status:Hold For - Exact Date; Requested for: In Office  Collection;    · (1) TSH WITH FT4 REFLEX; Status:Hold For - Exact Date; Requested for: In Office  Collection;   Subcutaneous mass    · 2 - Larry Becerril MD, Lianne Dangelo  (Plastic And Reconstructive Surgery) Co-Management  *   Status: Hold For - Scheduling  Requested for: [id number]  Care Summary provided  : Yes    Discussion/Summary  Impression: health maintenance visit, healthy adult male  Currently, he eats an adequate diet and has an adequate exercise regimen  Prostate cancer screening: the risks and benefits of prostate cancer screening were discussed  Testicular cancer screening: the risks and benefits of testicular cancer screening were discussed and monthly self testicular exam was advised  Colorectal cancer screening: the risks and benefits of colorectal cancer screening were discussed  Screening lab work includes glucose and lipid profile  The risks and benefits of immunizations were discussed  Advice and education were given regarding nutrition, aerobic exercise, sunscreen use and self skin examination  Fasting labs drawn as above  Patient to continue present treatment  Patient instructed to follow a low-fat and a low-carbohydrate diet and continue regular exercise program  Patient is being referred to Dr parkinson, plastic surgeon regarding subcutaneous mass on his scalp  Patient to return to the office annette Gray The treatment plan was reviewed with the patient/guardian  The patient/guardian understands and agrees with the treatment plan      Chief Complaint  Wellness Visit      History of Present Illness  HM, Adult Male: The patient is being seen for a health maintenance evaluation  General Health: The patient's health since the last visit is described as good  He has regular dental visits  He denies vision problems  He denies hearing loss  Immunizations status: up to date  Lifestyle:  He consumes a diverse and healthy diet  He has weight concerns  He exercises regularly  He does not use tobacco  He denies alcohol use  He denies drug use  Screening:   metabolic screening reviewed and updated  HPI: Patient is a 51-year-old male who is here for preventative wellness physical exam and request fasting labs  Patient has been feeling well overall and continues to exercise daily  Patient is concerned about a lump on the back of his scalp that has been present for several years and gradually getting larger  Patient did receive yearly flu vaccine in October of 2017  Review of Systems    Constitutional: no fever, not feeling poorly, no chills and not feeling tired  Eyes: No complaints of eye pain, no red eyes, no discharge from eyes, no itchy eyes  ENT: no complaints of earache, no hearing loss, no nosebleeds, no nasal discharge, no sore throat, no hoarseness  Cardiovascular: no chest pain, no intermittent leg claudication, no palpitations and no extremity edema  Respiratory: No complaints of shortness of breath, no wheezing, no cough, no SOB on exertion, no orthopnea or PND  Gastrointestinal: No complaints of abdominal pain, no constipation, no nausea or vomiting, no diarrhea or bloody stools  Genitourinary: no dysuria, no urinary hesitancy and no nocturia  Musculoskeletal: No complaints of arthralgia, no myalgias, no joint swelling or stiffness, no limb pain or swelling     Integumentary: No complaints of skin rash or skin lesions, no itching, no skin wound, no dry skin  Neurological: No compliants of headache, no confusion, no convulsions, no numbness or tingling, no dizziness or fainting, no limb weakness, no difficulty walking  Psychiatric: no anxiety and no depression  Hematologic/Lymphatic: No complaints of swollen glands, no swollen glands in the neck, does not bleed easily, no easy bruising  Active Problems    1  Contact dermatitis (692 9) (L25 9)   2  Cough (786 2) (R05)   3  Flu vaccine need (V04 81) (Z23)   4  Kidney stone (592 0) (N20 0)   5  Viral disease (079 99) (B34 9)    Past Medical History    · Acute sinusitis (461 9) (J01 90)   · History of kidney stones (V13 01) (N95 539)    Surgical History    · History of Knee Surgery   · History of Primary Repair Of Knee Ligament Collateral, Anterior Cruciat   · History of Primary Repair Of Knee Ligament Cruciate    Family History  Father    · Family history of Arteriosclerosis of autologous arterial coronary artery bypass graft   · Family history of Calcium kidney stones   · Family history of gout (V18 19) (Z82 69)    Social History    ·    · Never a smoker   · Social alcohol use (Z78 9)    Current Meds   1  Daily Vitamin Oral Tablet; Therapy: (Recorded:19Imx1499) to Recorded   2  Omega 3 1200 MG Oral Capsule; Therapy: (Recorded:80Nlv1310) to Recorded    Allergies    1  No Known Drug Allergies    Vitals   Recorded: 12Jan2018 12:13PM Recorded: 12Jan2018 11:29AM   Temperature  97 7 F   Heart Rate 72    Respiration 16    Systolic 754    Diastolic 80    Height  5 ft 9 in   Weight  218 lb    BMI Calculated  32 19   BSA Calculated  2 14     Physical Exam    Constitutional   General appearance: No acute distress, well appearing and well nourished  Eyes   Conjunctiva and lids: No swelling, erythema, or discharge      Ears, Nose, Mouth, and Throat   External inspection of ears and nose: Normal     Otoscopic examination: Tympanic membrance translucent with normal light reflex  Canals patent without erythema  Oropharynx: Normal with no erythema, edema, exudate or lesions  Pulmonary   Respiratory effort: No increased work of breathing or signs of respiratory distress  Auscultation of lungs: Clear to auscultation  Cardiovascular   Auscultation of heart: Normal rate and rhythm, normal S1 and S2, without murmurs  Examination of extremities for edema and/or varicosities: Normal     Abdomen   Abdomen: Non-tender, no masses  Lymphatic   Palpation of lymph nodes in neck: No lymphadenopathy  Musculoskeletal   Gait and station: Normal     Inspection/palpation of joints, bones, and muscles: Normal     Skin   Skin and subcutaneous tissue: Abnormal   Sebaceous cyst versus lipoma left occipital area of the scalp     Psychiatric   Orientation to person, place and time: Normal     Mood and affect: Normal        Future Appointments    Date/Time Provider Specialty Site   11/01/2018 02:00 PM Sonny Hashimoto, [address] [place] [address] [address]     Signatures   Electronically signed by : Marlon Phillips DO; Jan 12 2018 12:24PM EST                       (Author)

## 2018-08-31 ENCOUNTER — OFFICE VISIT (OUTPATIENT)
Dept: FAMILY MEDICINE CLINIC | Facility: CLINIC | Age: 38
End: 2018-08-31
Payer: COMMERCIAL

## 2018-08-31 VITALS
HEIGHT: 69 IN | TEMPERATURE: 96.9 F | SYSTOLIC BLOOD PRESSURE: 120 MMHG | RESPIRATION RATE: 16 BRPM | DIASTOLIC BLOOD PRESSURE: 82 MMHG | WEIGHT: 211 LBS | HEART RATE: 76 BPM | BODY MASS INDEX: 31.25 KG/M2

## 2018-08-31 DIAGNOSIS — Z00.00 PHYSICAL EXAM: Primary | ICD-10-CM

## 2018-08-31 PROBLEM — E78.49 OTHER HYPERLIPIDEMIA: Status: ACTIVE | Noted: 2018-08-31

## 2018-08-31 PROBLEM — E78.5 HYPERLIPIDEMIA: Status: ACTIVE | Noted: 2018-08-31

## 2018-08-31 PROBLEM — N20.0 KIDNEY STONE: Status: ACTIVE | Noted: 2017-09-18

## 2018-08-31 PROBLEM — S83.509A SPRAIN OF CRUCIATE LIGAMENT OF KNEE: Status: ACTIVE | Noted: 2018-08-31

## 2018-08-31 PROBLEM — S83.289A TEAR OF LATERAL CARTILAGE OR MENISCUS OF KNEE, CURRENT: Status: ACTIVE | Noted: 2018-08-31

## 2018-08-31 PROBLEM — R22.9 SUBCUTANEOUS MASS: Status: ACTIVE | Noted: 2018-01-12

## 2018-08-31 LAB
CHOLEST SERPL-MCNC: 227 MG/DL (ref 50–200)
EST. AVERAGE GLUCOSE BLD GHB EST-MCNC: 114 MG/DL
HBA1C MFR BLD: 5.6 % (ref 4.2–6.3)
HDLC SERPL-MCNC: 51 MG/DL (ref 40–60)
LDLC SERPL CALC-MCNC: 157 MG/DL (ref 0–100)
NONHDLC SERPL-MCNC: 176 MG/DL
TRIGL SERPL-MCNC: 97 MG/DL

## 2018-08-31 PROCEDURE — 80061 LIPID PANEL: CPT | Performed by: FAMILY MEDICINE

## 2018-08-31 PROCEDURE — 83036 HEMOGLOBIN GLYCOSYLATED A1C: CPT | Performed by: FAMILY MEDICINE

## 2018-08-31 PROCEDURE — 36415 COLL VENOUS BLD VENIPUNCTURE: CPT | Performed by: FAMILY MEDICINE

## 2018-08-31 PROCEDURE — 99395 PREV VISIT EST AGE 18-39: CPT | Performed by: FAMILY MEDICINE

## 2018-08-31 NOTE — PROGRESS NOTES
Assessment/Plan:    Labs drawn for lipid profile and hemoglobin A1c  Patient to continue present treatment  Patient instructed follow a low-fat diet and continue regular exercise program   Return to the office in 1 year  Diagnoses and all orders for this visit:    Physical exam  Comments:  Labs for lipid profile and hemoglobin A1c  Orders:  -     HEMOGLOBIN A1C W/ EAG ESTIMATION  -     Lipid panel          Subjective:      Patient ID: Russell Madrigal is a 45 y o  male  Patient is a 35-year-old male who is here for an annual physical exam and biometrics for his health insurance plan  Patient has been feeling well overall and has been exercising 7 days a week for 1-2 hours per day  The following portions of the patient's history were reviewed and updated as appropriate: allergies, current medications, past family history, past medical history, past social history, past surgical history and problem list     Review of Systems   Constitutional: Negative for activity change, appetite change, fatigue and unexpected weight change  HENT: Negative  Eyes: Negative  Respiratory: Negative for cough, chest tightness, shortness of breath and wheezing  Cardiovascular: Negative for chest pain, palpitations and leg swelling  Gastrointestinal: Negative for abdominal pain, blood in stool, constipation, diarrhea, nausea and vomiting  Endocrine: Negative for cold intolerance and heat intolerance  Genitourinary: Negative for difficulty urinating, dysuria and hematuria  Musculoskeletal: Negative  Skin: Negative  Neurological: Negative for dizziness, syncope, weakness, light-headedness and headaches  Hematological: Negative for adenopathy  Does not bruise/bleed easily  Psychiatric/Behavioral: Negative for dysphoric mood and sleep disturbance  The patient is not nervous/anxious            Objective:      /82   Pulse 76   Temp (!) 96 9 °F (36 1 °C)   Resp 16   Ht 5' 9 09" (1 755 m)   Wt 95 7 kg (211 lb)   BMI 31 07 kg/m²          Physical Exam   Constitutional: He is oriented to person, place, and time  He appears well-developed and well-nourished  HENT:   Head: Normocephalic  Right Ear: External ear normal    Left Ear: External ear normal    Nose: Nose normal    Mouth/Throat: Oropharynx is clear and moist    Eyes: Conjunctivae are normal  No scleral icterus  Neck: Neck supple  No thyromegaly present  Cardiovascular: Normal rate and regular rhythm  Pulmonary/Chest: Effort normal and breath sounds normal    Abdominal: Soft  There is no tenderness  Musculoskeletal: He exhibits no edema  Lymphadenopathy:     He has no cervical adenopathy  Neurological: He is alert and oriented to person, place, and time  Skin: Skin is warm and dry  Psychiatric: He has a normal mood and affect   His behavior is normal  Judgment and thought content normal

## 2018-10-05 ENCOUNTER — CLINICAL SUPPORT (OUTPATIENT)
Dept: FAMILY MEDICINE CLINIC | Facility: CLINIC | Age: 38
End: 2018-10-05
Payer: COMMERCIAL

## 2018-10-05 DIAGNOSIS — Z23 NEED FOR INFLUENZA VACCINATION: Primary | ICD-10-CM

## 2018-10-05 PROCEDURE — 90471 IMMUNIZATION ADMIN: CPT

## 2018-10-05 PROCEDURE — 90686 IIV4 VACC NO PRSV 0.5 ML IM: CPT

## 2018-10-05 NOTE — PROGRESS NOTES
Patient presents today for influenza vaccine  0 5 ML left deltoid intramuscularly  Patient tolerated well

## 2018-10-23 RX ORDER — MELOXICAM 15 MG/1
15 TABLET ORAL DAILY
Refills: 1 | COMMUNITY
Start: 2018-09-07 | End: 2018-12-17 | Stop reason: ALTCHOICE

## 2018-12-15 ENCOUNTER — OFFICE VISIT (OUTPATIENT)
Dept: URGENT CARE | Facility: MEDICAL CENTER | Age: 38
End: 2018-12-15
Payer: COMMERCIAL

## 2018-12-15 VITALS
DIASTOLIC BLOOD PRESSURE: 74 MMHG | HEART RATE: 62 BPM | OXYGEN SATURATION: 97 % | SYSTOLIC BLOOD PRESSURE: 124 MMHG | WEIGHT: 217 LBS | HEIGHT: 70 IN | RESPIRATION RATE: 16 BRPM | BODY MASS INDEX: 31.07 KG/M2 | TEMPERATURE: 97.4 F

## 2018-12-15 DIAGNOSIS — J02.9 SORE THROAT: ICD-10-CM

## 2018-12-15 DIAGNOSIS — J06.9 ACUTE URI: Primary | ICD-10-CM

## 2018-12-15 LAB — S PYO AG THROAT QL: NEGATIVE

## 2018-12-15 PROCEDURE — 87430 STREP A AG IA: CPT | Performed by: FAMILY MEDICINE

## 2018-12-15 PROCEDURE — 99213 OFFICE O/P EST LOW 20 MIN: CPT | Performed by: FAMILY MEDICINE

## 2018-12-15 RX ORDER — BENZONATATE 100 MG/1
100 CAPSULE ORAL 3 TIMES DAILY PRN
Qty: 20 CAPSULE | Refills: 0 | Status: SHIPPED | OUTPATIENT
Start: 2018-12-15 | End: 2019-09-13

## 2018-12-15 RX ORDER — FLUTICASONE PROPIONATE 50 MCG
2 SPRAY, SUSPENSION (ML) NASAL DAILY
Qty: 16 G | Refills: 0 | Status: SHIPPED | OUTPATIENT
Start: 2018-12-15 | End: 2019-04-09

## 2018-12-15 NOTE — PROGRESS NOTES
[facility]        NAME: Haim Rodriguez is a 45 y o  male  : 1980    MRN: [id number]  DATE: December 15, 2018  TIME: 4:06 PM    Assessment and Plan   Acute URI [J06 9]  1  Acute URI  fluticasone (FLONASE) 50 mcg/act nasal spray    benzonatate (TESSALON PERLES) 100 mg capsule   2  Sore throat  POCT rapid strepA         Patient Instructions       Follow up with PCP in 3-5 days  Proceed to  ER if symptoms worsen  Chief Complaint     Chief Complaint   Patient presents with    Sore Throat     Pt  with nasal congestion, post nasal drip since yesterday    Cough    Nasal Congestion    Fatigue         History of Present Illness       Patient with URI symptoms for the last day  Mainly complaining of nasal congestion, slight sore throat  Describes having some mild cough  He has been taking some lozenges and was gargling with salt water which seems to help  Denies headache  Complaining of some postnasal drip  Refers to mild body ache  However, denies fever or chills  Review of Systems   Review of Systems   Constitutional: Negative  HENT: Positive for sore throat  Respiratory: Positive for cough            Current Medications       Current Outpatient Prescriptions:     Multiple Vitamin (MULTIVITAMIN) tablet, Take 1 tablet by mouth daily, Disp: , Rfl:     Omega 3 1200 MG CAPS, Take by mouth, Disp: , Rfl:     Probiotic Product (SOLUBLE FIBER/PROBIOTICS PO), Take 1 capsule by mouth daily, Disp: , Rfl:     benzonatate (TESSALON PERLES) 100 mg capsule, Take 1 capsule (100 mg total) by mouth 3 (three) times a day as needed for cough, Disp: 20 capsule, Rfl: 0    fluticasone (FLONASE) 50 mcg/act nasal spray, 2 sprays into each nostril daily, Disp: 16 g, Rfl: 0    meloxicam (MOBIC) 15 mg tablet, Take 15 mg by mouth daily, Disp: , Rfl: 1    Current Allergies     Allergies as of 12/15/2018    (No Known Allergies)            The following portions of the patient's history were reviewed and updated as appropriate: allergies, current medications, past family history, past medical history, past social history, past surgical history and problem list      Past Medical History:   Diagnosis Date    Anxiety     h/o    Calculus of ureter     Chronic tension headaches     Kidney stone     passed on own     Kidney stones     Panic attacks     h/o    PONV (postoperative nausea and vomiting)        Past Surgical History:   Procedure Laterality Date    KNEE SURGERY Bilateral     multiple,last assessed 1/2/17    REPAIR KNEE LIGAMENT      SCALP EXCISION N/A 2/23/2018    Procedure: EXCISION SCALP MASS;  Surgeon: Sharon Jaimes MD;  Location: [facility];  Service: Plastics    WISDOM TOOTH EXTRACTION      one extracted       Family History   Problem Relation Age of Onset    Other Father         arteriosclerosis of autologous arterial coronary artery bypass graft    Nephrolithiasis Father         calcium    Gout Father          Medications have been verified  Objective   /74 (BP Location: Left arm, Patient Position: Sitting, Cuff Size: Standard)   Pulse 62   Temp (!) 97 4 °F (36 3 °C) (Tympanic)   Resp 16   Ht 5' 10" (1 778 m)   Wt 98 4 kg (217 lb)   SpO2 97%   BMI 31 14 kg/m²        Physical Exam     Physical Exam   HENT:   Right Ear: External ear normal    Left Ear: External ear normal    Nose: Nose normal    Mouth/Throat: Oropharynx is clear and moist    Neck: Normal range of motion  Neck supple  Cardiovascular: Normal rate, regular rhythm and normal heart sounds      Pulmonary/Chest: Effort normal and breath sounds normal

## 2018-12-17 ENCOUNTER — OFFICE VISIT (OUTPATIENT)
Dept: FAMILY MEDICINE CLINIC | Facility: CLINIC | Age: 38
End: 2018-12-17

## 2018-12-17 VITALS
HEART RATE: 72 BPM | RESPIRATION RATE: 16 BRPM | HEIGHT: 70 IN | BODY MASS INDEX: 30.49 KG/M2 | DIASTOLIC BLOOD PRESSURE: 86 MMHG | SYSTOLIC BLOOD PRESSURE: 132 MMHG | TEMPERATURE: 97.3 F | WEIGHT: 213 LBS

## 2018-12-17 DIAGNOSIS — J01.10 ACUTE FRONTAL SINUSITIS, RECURRENCE NOT SPECIFIED: Primary | ICD-10-CM

## 2018-12-17 PROCEDURE — 3008F BODY MASS INDEX DOCD: CPT | Performed by: FAMILY MEDICINE

## 2018-12-17 PROCEDURE — 99213 OFFICE O/P EST LOW 20 MIN: CPT | Performed by: FAMILY MEDICINE

## 2018-12-17 PROCEDURE — 1036F TOBACCO NON-USER: CPT | Performed by: FAMILY MEDICINE

## 2018-12-17 RX ORDER — CEFUROXIME AXETIL 250 MG/1
250 TABLET ORAL EVERY 12 HOURS SCHEDULED
Qty: 20 TABLET | Refills: 0 | Status: SHIPPED | OUTPATIENT
Start: 2018-12-17 | End: 2018-12-27

## 2018-12-17 NOTE — PROGRESS NOTES
Assessment/Plan:    Patient will be started on Ceftin 250 mg 1 b i d  for 10 days  He may continue Flonase 2 sprays per nostril daily and Mucinex  Recommend increase fluids and rest   Return the office in 1 week or sooner annette Coy Diagnoses and all orders for this visit:    Acute frontal sinusitis, recurrence not specified  Comments:  Ceftin 250 mg 1 b i d  for 10 days  Flonase and Mucinex  Increase fluids and rest   Orders:  -     cefuroxime (CEFTIN) 250 mg tablet; Take 1 tablet (250 mg total) by mouth every 12 (twelve) hours for 10 days          Subjective:      Patient ID: Curt Diaz is a 45 y o  male  Patient started 2 days ago with cold symptoms  He complains of nasal congestion productive of yellow mucus, sore throat and cough  He admits to headache and low-grade fever  Patient was seen at urgent care, had a negative rapid strep test and treated with Tessalon Perles and Flonase nasal spray without significant relief  URI    This is a new problem  The current episode started in the past 7 days  The problem has been gradually worsening  The maximum temperature recorded prior to his arrival was 100 4 - 100 9 F  Associated symptoms include congestion, coughing, ear pain, headaches, a plugged ear sensation, rhinorrhea, sinus pain, sneezing and a sore throat  Pertinent negatives include no diarrhea, nausea, rash, vomiting or wheezing  He has tried increased fluids and decongestant for the symptoms  The treatment provided mild relief  The following portions of the patient's history were reviewed and updated as appropriate: allergies, current medications, past family history, past medical history, past social history, past surgical history and problem list     Review of Systems   HENT: Positive for congestion, ear pain, rhinorrhea, sinus pain, sneezing and sore throat  Respiratory: Positive for cough  Negative for wheezing      Gastrointestinal: Negative for diarrhea, nausea and vomiting  Skin: Negative for rash  Neurological: Positive for headaches  Objective:      /86   Pulse 72   Temp (!) 97 3 °F (36 3 °C) (Tympanic)   Resp 16   Ht 5' 10" (1 778 m)   Wt 96 6 kg (213 lb)   BMI 30 56 kg/m²          Physical Exam   Constitutional: He is oriented to person, place, and time  He appears well-developed and well-nourished  HENT:   Head: Normocephalic  Right Ear: External ear normal    Left Ear: External ear normal    Positive turbinates swelling with purulent drainage  Throat postnasal drainage and erythema  Mucous membranes moist    Eyes: Conjunctivae are normal  No scleral icterus  Neck: Neck supple  Cardiovascular: Normal rate and regular rhythm  Pulmonary/Chest: Effort normal and breath sounds normal    Abdominal: Soft  There is no tenderness  Musculoskeletal: He exhibits no edema  Lymphadenopathy:     He has no cervical adenopathy  Neurological: He is alert and oriented to person, place, and time  Skin: Skin is warm and dry  Psychiatric: He has a normal mood and affect

## 2019-04-09 ENCOUNTER — OFFICE VISIT (OUTPATIENT)
Dept: FAMILY MEDICINE CLINIC | Facility: CLINIC | Age: 39
End: 2019-04-09
Payer: COMMERCIAL

## 2019-04-09 VITALS
SYSTOLIC BLOOD PRESSURE: 120 MMHG | RESPIRATION RATE: 18 BRPM | HEIGHT: 70 IN | BODY MASS INDEX: 30.06 KG/M2 | OXYGEN SATURATION: 97 % | DIASTOLIC BLOOD PRESSURE: 60 MMHG | WEIGHT: 210 LBS | TEMPERATURE: 98.4 F | HEART RATE: 94 BPM

## 2019-04-09 DIAGNOSIS — J02.9 PHARYNGITIS, UNSPECIFIED ETIOLOGY: Primary | ICD-10-CM

## 2019-04-09 LAB — S PYO AG THROAT QL: NEGATIVE

## 2019-04-09 PROCEDURE — 1036F TOBACCO NON-USER: CPT | Performed by: FAMILY MEDICINE

## 2019-04-09 PROCEDURE — 87880 STREP A ASSAY W/OPTIC: CPT | Performed by: FAMILY MEDICINE

## 2019-04-09 PROCEDURE — 99213 OFFICE O/P EST LOW 20 MIN: CPT | Performed by: FAMILY MEDICINE

## 2019-04-09 PROCEDURE — 3008F BODY MASS INDEX DOCD: CPT | Performed by: FAMILY MEDICINE

## 2019-04-09 RX ORDER — MELOXICAM 15 MG/1
15 TABLET ORAL DAILY
COMMUNITY
End: 2019-09-13

## 2019-04-09 RX ORDER — AZITHROMYCIN 250 MG/1
TABLET, FILM COATED ORAL
Qty: 6 TABLET | Refills: 0 | Status: SHIPPED | OUTPATIENT
Start: 2019-04-09 | End: 2019-04-14

## 2019-09-13 ENCOUNTER — OFFICE VISIT (OUTPATIENT)
Dept: FAMILY MEDICINE CLINIC | Facility: CLINIC | Age: 39
End: 2019-09-13
Payer: COMMERCIAL

## 2019-09-13 VITALS
WEIGHT: 216 LBS | RESPIRATION RATE: 16 BRPM | DIASTOLIC BLOOD PRESSURE: 78 MMHG | BODY MASS INDEX: 30.92 KG/M2 | HEART RATE: 76 BPM | TEMPERATURE: 97.7 F | SYSTOLIC BLOOD PRESSURE: 114 MMHG | HEIGHT: 70 IN

## 2019-09-13 DIAGNOSIS — Z00.00 PHYSICAL EXAM: Primary | ICD-10-CM

## 2019-09-13 PROCEDURE — 99395 PREV VISIT EST AGE 18-39: CPT | Performed by: FAMILY MEDICINE

## 2019-09-13 NOTE — PROGRESS NOTES
Assessment/Plan:  Patient given lab requisition for fasting labs as below  Patient instructed to follow a low-fat, low-cholesterol and low-carbohydrate diet continue regular exercise program   Weight loss encouraged  Return the office in 1 year  Diagnoses and all orders for this visit:    Physical exam  -     Glucose, fasting; Future  -     HEMOGLOBIN A1C W/ EAG ESTIMATION; Future  -     Lipid panel; Future          Subjective:      Patient ID: Kameron Bailey is a 44 y o  male  Patient 59-year-old male who is here for yearly physical exam and biometric testing for his health insurance plan  He is not fasting today  Patient has been feeling well overall and continues exercising daily for over an hour doing cardio and weight training  The following portions of the patient's history were reviewed and updated as appropriate: allergies, current medications, past family history, past medical history, past social history, past surgical history and problem list     Review of Systems   Constitutional: Negative for activity change, appetite change, fatigue and unexpected weight change  HENT: Negative  Eyes: Negative  Respiratory: Negative for cough, chest tightness, shortness of breath and wheezing  Cardiovascular: Negative for chest pain, palpitations and leg swelling  Gastrointestinal: Negative for abdominal pain, blood in stool, constipation, diarrhea, nausea and vomiting  Endocrine: Negative for cold intolerance and heat intolerance  Genitourinary: Negative for difficulty urinating, dysuria, frequency, hematuria and urgency  Musculoskeletal: Negative for arthralgias, back pain, gait problem, joint swelling, myalgias, neck pain and neck stiffness  Skin: Negative  Neurological: Negative for dizziness, syncope, weakness, light-headedness, numbness and headaches  Hematological: Negative for adenopathy  Does not bruise/bleed easily     Psychiatric/Behavioral: Negative for decreased concentration, dysphoric mood and sleep disturbance  The patient is not nervous/anxious  Objective:      /78   Pulse 76   Temp 97 7 °F (36 5 °C)   Resp 16   Ht 5' 9 69" (1 77 m)   Wt 98 kg (216 lb)   BMI 31 27 kg/m²          Physical Exam   Constitutional: He is oriented to person, place, and time  He appears well-developed and well-nourished  HENT:   Head: Normocephalic  Right Ear: External ear normal    Left Ear: External ear normal    Nose: Nose normal    Eyes: Conjunctivae are normal  No scleral icterus  Neck: Neck supple  No thyromegaly present  Cardiovascular: Normal rate and regular rhythm  Pulmonary/Chest: Effort normal and breath sounds normal    Abdominal: Soft  There is no tenderness  Musculoskeletal: He exhibits no edema  Lymphadenopathy:     He has no cervical adenopathy  Neurological: He is alert and oriented to person, place, and time  Skin: Skin is warm and dry  Psychiatric: He has a normal mood and affect  His behavior is normal  Judgment and thought content normal        BMI Counseling: Body mass index is 31 27 kg/m²  The BMI is above normal  Nutrition recommendations include reducing portion sizes, decreasing overall calorie intake, reducing fast food intake, consuming healthier snacks, decreasing soda and/or juice intake, moderation in carbohydrate intake and reducing intake of saturated fat and trans fat  Exercise recommendations include moderate aerobic physical activity for 150 minutes/week

## 2019-09-14 ENCOUNTER — APPOINTMENT (OUTPATIENT)
Dept: LAB | Facility: MEDICAL CENTER | Age: 39
End: 2019-09-14
Payer: COMMERCIAL

## 2019-09-14 DIAGNOSIS — Z00.00 PHYSICAL EXAM: ICD-10-CM

## 2019-09-14 LAB
CHOLEST SERPL-MCNC: 218 MG/DL (ref 50–200)
EST. AVERAGE GLUCOSE BLD GHB EST-MCNC: 108 MG/DL
GLUCOSE P FAST SERPL-MCNC: 86 MG/DL (ref 65–99)
HBA1C MFR BLD: 5.4 % (ref 4.2–6.3)
HDLC SERPL-MCNC: 51 MG/DL (ref 40–60)
LDLC SERPL CALC-MCNC: 146 MG/DL (ref 0–100)
NONHDLC SERPL-MCNC: 167 MG/DL
TRIGL SERPL-MCNC: 107 MG/DL

## 2019-09-14 PROCEDURE — 36415 COLL VENOUS BLD VENIPUNCTURE: CPT

## 2019-09-14 PROCEDURE — 80061 LIPID PANEL: CPT

## 2019-09-14 PROCEDURE — 83036 HEMOGLOBIN GLYCOSYLATED A1C: CPT

## 2019-09-14 PROCEDURE — 82947 ASSAY GLUCOSE BLOOD QUANT: CPT

## 2019-10-04 ENCOUNTER — CLINICAL SUPPORT (OUTPATIENT)
Dept: FAMILY MEDICINE CLINIC | Facility: CLINIC | Age: 39
End: 2019-10-04
Payer: COMMERCIAL

## 2019-10-04 DIAGNOSIS — Z23 NEED FOR INFLUENZA VACCINATION: Primary | ICD-10-CM

## 2019-10-04 PROCEDURE — 90471 IMMUNIZATION ADMIN: CPT

## 2019-10-04 PROCEDURE — 90686 IIV4 VACC NO PRSV 0.5 ML IM: CPT

## 2020-02-21 ENCOUNTER — OFFICE VISIT (OUTPATIENT)
Dept: URGENT CARE | Facility: MEDICAL CENTER | Age: 40
End: 2020-02-21
Payer: COMMERCIAL

## 2020-02-21 VITALS
TEMPERATURE: 97.7 F | DIASTOLIC BLOOD PRESSURE: 89 MMHG | RESPIRATION RATE: 20 BRPM | HEART RATE: 78 BPM | OXYGEN SATURATION: 98 % | WEIGHT: 216 LBS | SYSTOLIC BLOOD PRESSURE: 137 MMHG | BODY MASS INDEX: 31.99 KG/M2 | HEIGHT: 69 IN

## 2020-02-21 DIAGNOSIS — H65.192 OTHER NON-RECURRENT ACUTE NONSUPPURATIVE OTITIS MEDIA OF LEFT EAR: Primary | ICD-10-CM

## 2020-02-21 PROCEDURE — 99213 OFFICE O/P EST LOW 20 MIN: CPT | Performed by: PHYSICIAN ASSISTANT

## 2020-02-21 RX ORDER — CETIRIZINE HYDROCHLORIDE 10 MG/1
10 TABLET ORAL DAILY
Qty: 30 TABLET | Refills: 0 | Status: SHIPPED | OUTPATIENT
Start: 2020-02-21 | End: 2020-09-18

## 2020-02-21 RX ORDER — FLUTICASONE PROPIONATE 50 MCG
2 SPRAY, SUSPENSION (ML) NASAL DAILY
Qty: 16 G | Refills: 0 | Status: SHIPPED | OUTPATIENT
Start: 2020-02-21 | End: 2020-09-18

## 2020-02-21 RX ORDER — ALBUTEROL SULFATE 90 UG/1
2 AEROSOL, METERED RESPIRATORY (INHALATION) EVERY 6 HOURS PRN
Qty: 1 INHALER | Refills: 0 | Status: SHIPPED | OUTPATIENT
Start: 2020-02-21 | End: 2020-09-18

## 2020-02-21 RX ORDER — AMOXICILLIN 875 MG/1
875 TABLET, COATED ORAL 2 TIMES DAILY
Qty: 14 TABLET | Refills: 0 | Status: SHIPPED | OUTPATIENT
Start: 2020-02-21 | End: 2020-02-28

## 2020-02-21 RX ORDER — BENZONATATE 100 MG/1
100 CAPSULE ORAL 3 TIMES DAILY PRN
Qty: 15 CAPSULE | Refills: 0 | Status: SHIPPED | OUTPATIENT
Start: 2020-02-21 | End: 2020-09-18

## 2020-02-21 NOTE — PATIENT INSTRUCTIONS
take antibiotic as directed until completed   continue using home medications as prescribed   use additional medications as directed for symptomatic relief as needed   Motrin and/or Tylenol as needed for fevers aches and pains   drink plenty of fluids stay well hydrated  Follow up with PCP in 3-5 days  Proceed to  ER if symptoms worsen  Otitis Media   AMBULATORY CARE:   Otitis media  is an ear infection  Common symptoms include the following:   · Fever or a headache    · Ear pain    · Trouble hearing    · Ear feels plugged or full or you have ringing or buzzing in your ear    · Dizziness or you lose your balance    · Nausea or vomiting  Seek immediate care for the following symptoms:   · Seizure    · Fever and a stiff neck  Treatment for otitis media  may include any of the following:  · NSAIDs , such as ibuprofen, help decrease swelling, pain, and fever  This medicine is available with or without a doctor's order  NSAIDs can cause stomach bleeding or kidney problems in certain people  If you take blood thinner medicine, always ask your healthcare provider if NSAIDs are safe for you  Always read the medicine label and follow directions  · Ear drops  to help treat your ear pain  · Antibiotics  to help kill the germs that caused your ear infection  Care for otitis media:   · Use heat  Place a warm, moist washcloth on your ear to decrease pain  Apply for 15 to 20 minutes, 3 to 4 times a day    · Use ice  Ice helps decrease swelling and pain  Use an ice pack or put crushed ice in a plastic bag  Cover the ice pack with a towel and place it on your ear for 15 to 20 minutes, 3 to 4 times a day for 2 days  Prevent otitis media:   · Wash your hands often  This will help prevent the spread of germs  Encourage everyone in your house to wash their hands with soap and water after they use the bathroom   Everyone should also wash their hands after they change a child's diaper and before they prepare or eat food     · Stay away from people who are ill  Germs are easily and quickly spread through contact  Follow up with your healthcare provider as directed:  Write down your questions so you remember to ask them during your visits  © 2017 [facility] [place] Information is for End User's use only and may not be sold, redistributed or otherwise used for commercial purposes  All illustrations and images included in CareNotes® are the copyrighted property of A D A M , Inc  or Clarence Constantino  The above information is an  only  It is not intended as medical advice for individual conditions or treatments  Talk to your doctor, nurse or pharmacist before following any medical regimen to see if it is safe and effective for you

## 2020-02-21 NOTE — PROGRESS NOTES
[id number]        NAME: Sarita Saucedo is a 44 y o  male  : 1980    MRN: [id number]  DATE: 2020  TIME: 6:12 PM    Assessment and Plan   Other non-recurrent acute nonsuppurative otitis media of left ear [H65 192]  1  Other non-recurrent acute nonsuppurative otitis media of left ear  albuterol (PROVENTIL HFA,VENTOLIN HFA) 90 mcg/act inhaler    fluticasone (FLONASE) 50 mcg/act nasal spray    cetirizine (ZyrTEC) 10 mg tablet    amoxicillin (AMOXIL) 875 mg tablet    benzonatate (TESSALON PERLES) 100 mg capsule         Patient Instructions      take antibiotic as directed until completed   continue using home medications as prescribed   use additional medications as directed for symptomatic relief as needed   Motrin and/or Tylenol as needed for fevers aches and pains   drink plenty of fluids stay well hydrated  Follow up with PCP in 3-5 days  Proceed to  ER if symptoms worsen  Chief Complaint     Chief Complaint   Patient presents with    Cough     patient states he ahs been having old symptoms for a couple of days and today it got worse  He is having pain in his L ear, nasal congestion an dcough  History of Present Illness        40-year-old male presents with cough  Congestion runny nose sore throat and left ear pain  Cough congestion sore throat has been a for last couple days however today left ear started bothering him  Has felt fevers but has not taking his temperature  Denies any abdominal pain nausea vomiting or diarrhea  Cough   This is a new problem  The current episode started in the past 7 days  The problem has been waxing and waning  The problem occurs constantly  The cough is non-productive  Associated symptoms include ear pain, a fever, nasal congestion, rhinorrhea and a sore throat  Pertinent negatives include no chills, postnasal drip or wheezing  Nothing aggravates the symptoms  He has tried nothing for the symptoms  The treatment provided no relief  Review of Systems   Review of Systems   Constitutional: Positive for fever  Negative for chills  HENT: Positive for ear pain, rhinorrhea and sore throat  Negative for postnasal drip  Respiratory: Positive for cough  Negative for wheezing  Cardiovascular: Negative  Gastrointestinal: Negative  Musculoskeletal: Negative  Skin: Negative  Neurological: Negative            Current Medications       Current Outpatient Medications:     albuterol (PROVENTIL HFA,VENTOLIN HFA) 90 mcg/act inhaler, Inhale 2 puffs every 6 (six) hours as needed for wheezing, Disp: 1 Inhaler, Rfl: 0    amoxicillin (AMOXIL) 875 mg tablet, Take 1 tablet (875 mg total) by mouth 2 (two) times a day for 7 days, Disp: 14 tablet, Rfl: 0    benzonatate (TESSALON PERLES) 100 mg capsule, Take 1 capsule (100 mg total) by mouth 3 (three) times a day as needed for cough, Disp: 15 capsule, Rfl: 0    cetirizine (ZyrTEC) 10 mg tablet, Take 1 tablet (10 mg total) by mouth daily, Disp: 30 tablet, Rfl: 0    fluticasone (FLONASE) 50 mcg/act nasal spray, 2 sprays into each nostril daily, Disp: 16 g, Rfl: 0    Multiple Vitamin (MULTIVITAMIN) tablet, Take 1 tablet by mouth daily, Disp: , Rfl:     Omega 3 1200 MG CAPS, Take by mouth, Disp: , Rfl:     Probiotic Product (SOLUBLE FIBER/PROBIOTICS PO), Take 1 capsule by mouth daily, Disp: , Rfl:     Current Allergies     Allergies as of 02/21/2020    (No Known Allergies)            The following portions of the patient's history were reviewed and updated as appropriate: allergies, current medications, past family history, past medical history, past social history, past surgical history and problem list      Past Medical History:   Diagnosis Date    Anxiety     h/o    Calculus of ureter     Chronic tension headaches     Kidney stone     passed on own     Kidney stones     Panic attacks     h/o    PONV (postoperative nausea and vomiting)        Past Surgical History:   Procedure Laterality Date    KNEE SURGERY Bilateral     multiple,last assessed 1/2/17    REPAIR KNEE LIGAMENT      SCALP EXCISION N/A 2/23/2018    Procedure: EXCISION SCALP MASS;  Surgeon: Ranjeet Monaco MD;  Location: [facility];  Service: Plastics    WISDOM TOOTH EXTRACTION      one extracted       Family History   Problem Relation Age of Onset    Other Father         arteriosclerosis of autologous arterial coronary artery bypass graft    Nephrolithiasis Father         calcium    Gout Father          Medications have been verified  Objective   /89   Pulse 78   Temp 97 7 °F (36 5 °C)   Resp 20   Ht 5' 9" (1 753 m)   Wt 98 kg (216 lb)   SpO2 98%   BMI 31 90 kg/m²        Physical Exam     Physical Exam   Constitutional: He is oriented to person, place, and time  He appears well-developed and well-nourished  No distress  HENT:   Head: Normocephalic and atraumatic  Right Ear: Hearing, tympanic membrane, external ear and ear canal normal    Left Ear: Hearing, external ear and ear canal normal  Tympanic membrane is injected and erythematous  Nose: Nose normal    Mouth/Throat: Uvula is midline, oropharynx is clear and moist and mucous membranes are normal  No oropharyngeal exudate  Eyes: Conjunctivae and EOM are normal  Right eye exhibits no discharge  Left eye exhibits no discharge  Neck: Normal range of motion  Neck supple  Cardiovascular: Normal rate, regular rhythm, normal heart sounds and intact distal pulses  No murmur heard  Pulmonary/Chest: Effort normal and breath sounds normal  No respiratory distress  He has no wheezes  He has no rales  Abdominal: Soft  Bowel sounds are normal  There is no tenderness  Musculoskeletal: Normal range of motion  Lymphadenopathy:     He has no cervical adenopathy  Neurological: He is alert and oriented to person, place, and time  Skin: Skin is warm and dry  Psychiatric: He has a normal mood and affect     Nursing note and vitals reviewed

## 2020-03-19 ENCOUNTER — TELEPHONE (OUTPATIENT)
Dept: FAMILY MEDICINE CLINIC | Facility: CLINIC | Age: 40
End: 2020-03-19

## 2020-03-19 NOTE — TELEPHONE ENCOUNTER
Patient called stating he has had an intermittent cough for the last 2 - 3 weeks  Cough has picked up in the last 3 - 4 days  Cough is like a little tickle in the back of his throat  He has no fever, no SOB  Patient works at home and hasn't traveled anywhere  Per [facility] patient should try over the counter Claritin and see if he has any improvement and if he develops a fever or SOB he should call the office  Patient stated he has Zyrtec at home  [facility] stated that Zyrtec was fine to take

## 2020-09-18 ENCOUNTER — OFFICE VISIT (OUTPATIENT)
Dept: FAMILY MEDICINE CLINIC | Facility: CLINIC | Age: 40
End: 2020-09-18
Payer: COMMERCIAL

## 2020-09-18 VITALS
HEIGHT: 70 IN | WEIGHT: 214 LBS | BODY MASS INDEX: 30.64 KG/M2 | SYSTOLIC BLOOD PRESSURE: 124 MMHG | DIASTOLIC BLOOD PRESSURE: 82 MMHG | RESPIRATION RATE: 16 BRPM | HEART RATE: 64 BPM | TEMPERATURE: 97.5 F

## 2020-09-18 DIAGNOSIS — Z23 FLU VACCINE NEED: ICD-10-CM

## 2020-09-18 DIAGNOSIS — Z00.00 ANNUAL PHYSICAL EXAM: Primary | ICD-10-CM

## 2020-09-18 PROCEDURE — 99396 PREV VISIT EST AGE 40-64: CPT | Performed by: FAMILY MEDICINE

## 2020-09-18 PROCEDURE — 36415 COLL VENOUS BLD VENIPUNCTURE: CPT | Performed by: FAMILY MEDICINE

## 2020-09-18 PROCEDURE — 90686 IIV4 VACC NO PRSV 0.5 ML IM: CPT

## 2020-09-18 PROCEDURE — 1036F TOBACCO NON-USER: CPT | Performed by: FAMILY MEDICINE

## 2020-09-18 PROCEDURE — 90471 IMMUNIZATION ADMIN: CPT

## 2020-09-18 PROCEDURE — 3725F SCREEN DEPRESSION PERFORMED: CPT | Performed by: FAMILY MEDICINE

## 2020-09-18 NOTE — PROGRESS NOTES
Assessment/Plan:  Patient received flu vaccine today  Fasting labs drawn as below  Patient instructed to follow a low-fat and a low-carbohydrate diet and continue regular aerobic exercise  Weight loss encouraged  Return to the office in 1 year  Diagnoses and all orders for this visit:    Annual physical exam  -     Cancel: CBC and Platelet  -     Cancel: Comprehensive metabolic panel  -     Cancel: Lipid panel  -     Cancel: TSH, 3rd generation with Free T4 reflex  -     Cancel: UA w Reflex to Microscopic w Reflex to Culture - Clinic Collect  -     Cancel: Vitamin D 25 hydroxy  -     CBC and Platelet  -     Comprehensive metabolic panel  -     Lipid panel  -     TSH, 3rd generation with Free T4 reflex  -     UA w Reflex to Microscopic w Reflex to Culture - Clinic Collect  -     Vitamin D 25 hydroxy    Flu vaccine need  -     influenza vaccine, quadrivalent, 0 5 mL, preservative-free, for adult and pediatric patients 6 mos+ (AFLURIA, FLUARIX, FLULAVAL, FLUZONE)          Subjective:      Patient ID: Yarelis Leos is a 36 y o  male  Patient is a 69-year-old male who is here for annual physical exam and fasting labs  He requests flu vaccine  Patient has been feeling well overall and exercises regularly running 25-30 miles per week and weight training  The following portions of the patient's history were reviewed and updated as appropriate: allergies, current medications, past family history, past medical history, past social history, past surgical history and problem list     Review of Systems   Constitutional: Negative for activity change, appetite change, chills, diaphoresis, fatigue, fever and unexpected weight change  HENT: Negative  Eyes: Negative  Respiratory: Negative for cough, chest tightness, shortness of breath and wheezing  Cardiovascular: Negative for chest pain, palpitations and leg swelling     Gastrointestinal: Negative for abdominal pain, blood in stool, constipation, diarrhea, nausea and vomiting  Endocrine: Negative for cold intolerance and heat intolerance  Genitourinary: Negative for difficulty urinating, dysuria, frequency, hematuria and urgency  Musculoskeletal: Negative for arthralgias, back pain, gait problem, joint swelling, myalgias, neck pain and neck stiffness  Skin: Negative  Neurological: Negative for dizziness, syncope, weakness, light-headedness and headaches  Hematological: Negative for adenopathy  Does not bruise/bleed easily  Psychiatric/Behavioral: Negative for decreased concentration, dysphoric mood and sleep disturbance  The patient is not nervous/anxious  Objective:      /82   Pulse 64   Temp 97 5 °F (36 4 °C)   Resp 16   Ht 5' 9 88" (1 775 m)   Wt 97 1 kg (214 lb)   BMI 30 81 kg/m²          Physical Exam  Constitutional:       General: He is not in acute distress  Appearance: Normal appearance  HENT:      Head: Normocephalic  Mouth/Throat:      Mouth: Mucous membranes are moist    Eyes:      General: No scleral icterus  Conjunctiva/sclera: Conjunctivae normal    Neck:      Musculoskeletal: Neck supple  Vascular: No carotid bruit  Cardiovascular:      Rate and Rhythm: Normal rate and regular rhythm  Pulmonary:      Effort: Pulmonary effort is normal       Breath sounds: Normal breath sounds  Abdominal:      Palpations: Abdomen is soft  Tenderness: There is no abdominal tenderness  Musculoskeletal:      Right lower leg: No edema  Left lower leg: No edema  Lymphadenopathy:      Cervical: No cervical adenopathy  Skin:     General: Skin is warm and dry  Neurological:      General: No focal deficit present  Mental Status: He is alert and oriented to person, place, and time  Psychiatric:         Mood and Affect: Mood normal          Behavior: Behavior normal          Thought Content: Thought content normal          Judgment: Judgment normal          BMI Counseling:  Body mass index is 30 81 kg/m²  The BMI is above normal  Nutrition recommendations include reducing portion sizes, 3-5 servings of fruits/vegetables daily, consuming healthier snacks, moderation in carbohydrate intake and reducing intake of saturated fat and trans fat  Exercise recommendations include moderate aerobic physical activity for 150 minutes/week

## 2020-09-25 LAB
25(OH)D3 SERPL-MCNC: 52 NG/ML (ref 30–100)
ALBUMIN SERPL-MCNC: 4.6 G/DL (ref 3.6–5.1)
ALBUMIN/GLOB SERPL: 1.8 (CALC) (ref 1–2.5)
ALP SERPL-CCNC: 68 U/L (ref 36–130)
ALT SERPL-CCNC: 43 U/L (ref 9–46)
AST SERPL-CCNC: 29 U/L (ref 10–40)
BILIRUB SERPL-MCNC: 0.7 MG/DL (ref 0.2–1.2)
BUN SERPL-MCNC: 17 MG/DL (ref 7–25)
BUN/CREAT SERPL: NORMAL (CALC) (ref 6–22)
CALCIUM SERPL-MCNC: 9.9 MG/DL (ref 8.6–10.3)
CHLORIDE SERPL-SCNC: 101 MMOL/L (ref 98–110)
CHOLEST SERPL-MCNC: 228 MG/DL
CHOLEST/HDLC SERPL: 4.3 (CALC)
CO2 SERPL-SCNC: 25 MMOL/L (ref 20–32)
COLOR UR: NORMAL
CREAT SERPL-MCNC: 1.02 MG/DL (ref 0.6–1.35)
ERYTHROCYTE [DISTWIDTH] IN BLOOD BY AUTOMATED COUNT: 12.1 % (ref 11–15)
GLOBULIN SER CALC-MCNC: 2.6 G/DL (CALC) (ref 1.9–3.7)
GLUCOSE SERPL-MCNC: 79 MG/DL (ref 65–99)
HCT VFR BLD AUTO: 49.5 % (ref 38.5–50)
HDLC SERPL-MCNC: 53 MG/DL
HGB BLD-MCNC: 16.7 G/DL (ref 13.2–17.1)
LDLC SERPL CALC-MCNC: 147 MG/DL (CALC)
MCH RBC QN AUTO: 29.9 PG (ref 27–33)
MCHC RBC AUTO-ENTMCNC: 33.7 G/DL (ref 32–36)
MCV RBC AUTO: 88.6 FL (ref 80–100)
NONHDLC SERPL-MCNC: 175 MG/DL (CALC)
PLATELET # BLD AUTO: 295 THOUSAND/UL (ref 140–400)
PMV BLD REES-ECKER: 11.7 FL (ref 7.5–12.5)
POTASSIUM SERPL-SCNC: 5.1 MMOL/L (ref 3.5–5.3)
PROT SERPL-MCNC: 7.2 G/DL (ref 6.1–8.1)
RBC # BLD AUTO: 5.59 MILLION/UL (ref 4.2–5.8)
SL AMB EGFR AFRICAN AMERICAN: 106 ML/MIN/1.73M2
SL AMB EGFR NON AFRICAN AMERICAN: 92 ML/MIN/1.73M2
SODIUM SERPL-SCNC: 138 MMOL/L (ref 135–146)
TRIGL SERPL-MCNC: 150 MG/DL
TSH SERPL-ACNC: 2.11 MIU/L (ref 0.4–4.5)
WBC # BLD AUTO: 7.9 THOUSAND/UL (ref 3.8–10.8)

## 2021-03-10 DIAGNOSIS — Z23 ENCOUNTER FOR IMMUNIZATION: ICD-10-CM

## 2021-03-18 ENCOUNTER — IMMUNIZATIONS (OUTPATIENT)
Dept: FAMILY MEDICINE CLINIC | Facility: HOSPITAL | Age: 41
End: 2021-03-18

## 2021-03-18 DIAGNOSIS — Z23 ENCOUNTER FOR IMMUNIZATION: Primary | ICD-10-CM

## 2021-03-18 PROCEDURE — 91300 SARS-COV-2 / COVID-19 MRNA VACCINE (PFIZER-BIONTECH) 30 MCG: CPT

## 2021-03-18 PROCEDURE — 0001A SARS-COV-2 / COVID-19 MRNA VACCINE (PFIZER-BIONTECH) 30 MCG: CPT

## 2021-04-08 ENCOUNTER — IMMUNIZATIONS (OUTPATIENT)
Dept: FAMILY MEDICINE CLINIC | Facility: HOSPITAL | Age: 41
End: 2021-04-08

## 2021-04-08 DIAGNOSIS — Z23 ENCOUNTER FOR IMMUNIZATION: Primary | ICD-10-CM

## 2021-04-08 PROCEDURE — 91300 SARS-COV-2 / COVID-19 MRNA VACCINE (PFIZER-BIONTECH) 30 MCG: CPT

## 2021-04-08 PROCEDURE — 0002A SARS-COV-2 / COVID-19 MRNA VACCINE (PFIZER-BIONTECH) 30 MCG: CPT

## 2021-07-11 ENCOUNTER — OFFICE VISIT (OUTPATIENT)
Dept: URGENT CARE | Facility: MEDICAL CENTER | Age: 41
End: 2021-07-11
Payer: COMMERCIAL

## 2021-07-11 ENCOUNTER — NURSE TRIAGE (OUTPATIENT)
Dept: OTHER | Facility: OTHER | Age: 41
End: 2021-07-11

## 2021-07-11 VITALS
BODY MASS INDEX: 30.06 KG/M2 | DIASTOLIC BLOOD PRESSURE: 80 MMHG | HEIGHT: 70 IN | RESPIRATION RATE: 20 BRPM | SYSTOLIC BLOOD PRESSURE: 141 MMHG | TEMPERATURE: 98 F | OXYGEN SATURATION: 97 % | WEIGHT: 210 LBS | HEART RATE: 82 BPM

## 2021-07-11 DIAGNOSIS — R30.0 DYSURIA: Primary | ICD-10-CM

## 2021-07-11 DIAGNOSIS — R31.9 HEMATURIA OF UNKNOWN ETIOLOGY: ICD-10-CM

## 2021-07-11 LAB
SL AMB  POCT GLUCOSE, UA: ABNORMAL
SL AMB LEUKOCYTE ESTERASE,UA: ABNORMAL
SL AMB POCT BILIRUBIN,UA: ABNORMAL
SL AMB POCT BLOOD,UA: ABNORMAL
SL AMB POCT CLARITY,UA: CLEAR
SL AMB POCT COLOR,UA: ABNORMAL
SL AMB POCT KETONES,UA: ABNORMAL
SL AMB POCT NITRITE,UA: ABNORMAL
SL AMB POCT PH,UA: 6
SL AMB POCT SPECIFIC GRAVITY,UA: 1.02
SL AMB POCT URINE PROTEIN: ABNORMAL
SL AMB POCT UROBILINOGEN: ABNORMAL

## 2021-07-11 PROCEDURE — 99213 OFFICE O/P EST LOW 20 MIN: CPT | Performed by: PHYSICIAN ASSISTANT

## 2021-07-11 PROCEDURE — 81002 URINALYSIS NONAUTO W/O SCOPE: CPT | Performed by: PHYSICIAN ASSISTANT

## 2021-07-11 RX ORDER — AMOXICILLIN 875 MG/1
875 TABLET, COATED ORAL 3 TIMES DAILY
COMMUNITY
Start: 2021-07-06 | End: 2021-08-06

## 2021-07-11 RX ORDER — TAMSULOSIN HYDROCHLORIDE 0.4 MG/1
0.4 CAPSULE ORAL
Qty: 7 CAPSULE | Refills: 0 | Status: SHIPPED | OUTPATIENT
Start: 2021-07-11 | End: 2021-08-06

## 2021-07-11 NOTE — TELEPHONE ENCOUNTER
Reason for Disposition   [1] Pain or burning with passing urine AND [2] side (flank) or back pain present    Answer Assessment - Initial Assessment Questions  1  COLOR of URINE: "Describe the color of the urine "  (e g , tea-colored, pink, red, blood clots, bloody)  Pinked color     2  ONSET: "When did the bleeding start?"     7/11    3  EPISODES: "How many times has there been blood in the urine?" or "How many times today?"      3 times today    4  PAIN with URINATION: "Is there any pain with passing your urine?" If Yes, ask: "How bad is the pain?"  (Scale 1-10; or mild, moderate, severe)     - MILD - complains slightly about urination hurting     - MODERATE - interferes with normal activities       - SEVERE - excruciating, unwilling or unable to urinate because of the pain   Mild irritation    5  FEVER: "Do you have a fever?" If Yes, ask: "What is your temperature, how was it measured, and when did it start?"   Denies    6  ASSOCIATED SYMPTOMS: "Are you passing urine more frequently than usual?"      Denies     7   OTHER SYMPTOMS: "Do you have any other symptoms?" (e g , back/flank pain, abdominal pain, vomiting)    Lower abdominal pain and back pain    Protocols used: URINE - BLOOD IN-ADULT-

## 2021-07-11 NOTE — PROGRESS NOTES
[facility]        NAME: Barby Miller is a 36 y o  male  : 1980    MRN: [id number]  DATE: 2021  TIME: 5:27 PM    Assessment and Plan   Dysuria [R30 0]  1  Dysuria  POCT urine dip   2  Hematuria of unknown etiology  tamsulosin (FLOMAX) 0 4 mg         Patient Instructions       He does have a urologist and will follow up if he does not past his kidney stone  Chief Complaint     Chief Complaint   Patient presents with    Possible UTI     Patient states he has tenderness in hi slower back , burnig with urinationa nd blood inhi surine  History of Present Illness        Patient with history of kidney stones  At his last episode he was told he still had 4 other stones of various sizes  He usually passes them on  His own and has had to use Flomax at times  He drinks at least a gal of water a day  He just wanted to check to make sure he did not have infection  UA shows hematuria but otherwise negative  Difficulty Urinating   This is a new problem  The current episode started today  The problem occurs intermittently  The problem has been unchanged  The quality of the pain is described as burning  The pain is mild  There has been no fever  He is sexually active  There is no history of pyelonephritis  Associated symptoms include flank pain ( mild right) and hematuria  Pertinent negatives include no chills, discharge, frequency, hesitancy, nausea, possible pregnancy, sweats, urgency or vomiting  Review of Systems   Review of Systems   Constitutional: Negative for chills  Gastrointestinal: Negative for nausea and vomiting  Genitourinary: Positive for dysuria, flank pain ( mild right) and hematuria  Negative for frequency, hesitancy and urgency  All other systems reviewed and are negative          Current Medications       Current Outpatient Medications:     amoxicillin (AMOXIL) 875 mg tablet, Take 875 mg by mouth 3 (three) times a day, Disp: , Rfl:     Multiple Vitamin (MULTIVITAMIN) tablet, Take 1 tablet by mouth daily, Disp: , Rfl:     Omega 3 1200 MG CAPS, Take by mouth, Disp: , Rfl:     Probiotic Product (SOLUBLE FIBER/PROBIOTICS PO), Take 1 capsule by mouth daily, Disp: , Rfl:     tamsulosin (FLOMAX) 0 4 mg, Take 1 capsule (0 4 mg total) by mouth daily with dinner for 7 days, Disp: 7 capsule, Rfl: 0    Current Allergies     Allergies as of 07/11/2021    (No Known Allergies)            The following portions of the patient's history were reviewed and updated as appropriate: allergies, current medications, past family history, past medical history, past social history, past surgical history and problem list      Past Medical History:   Diagnosis Date    Anxiety     h/o    Calculus of ureter     Chronic tension headaches     Kidney stone     passed on own     Kidney stones     Panic attacks     h/o    PONV (postoperative nausea and vomiting)        Past Surgical History:   Procedure Laterality Date    KNEE SURGERY Bilateral     multiple,last assessed 1/2/17    REPAIR KNEE LIGAMENT      SCALP EXCISION N/A 2/23/2018    Procedure: EXCISION SCALP MASS;  Surgeon: Bud Lockwood MD;  [facility]: [facility];  Service: Plastics    WISDOM TOOTH EXTRACTION      one extracted       Family History   Problem Relation Age of Onset    Other Father         arteriosclerosis of autologous arterial coronary artery bypass graft    Nephrolithiasis Father         calcium    Gout Father          Medications have been verified  Objective   /80   Pulse 82   Temp 98 °F (36 7 °C)   Resp 20   Ht 5' 10" (1 778 m)   Wt 95 3 kg (210 lb)   SpO2 97%   BMI 30 13 kg/m²   No LMP for male patient  Physical Exam     Physical Exam  Vitals and nursing note reviewed  Constitutional:       Appearance: Normal appearance  He is normal weight  Cardiovascular:      Rate and Rhythm: Normal rate and regular rhythm  Pulses: Normal pulses     Pulmonary:      Effort: Pulmonary effort is normal    Abdominal:      General: Abdomen is flat  Bowel sounds are normal       Tenderness: There is right CVA tenderness  There is no guarding  Neurological:      Mental Status: He is alert

## 2021-07-11 NOTE — TELEPHONE ENCOUNTER
Regarding: kidney stone, blood    ----- Message from Thien Deras sent at 7/11/2021  3:54 PM EDT -----  "I think I'm passing another kidney stone, I have blood and discomfort but not excruciating pain"

## 2021-07-16 ENCOUNTER — TELEPHONE (OUTPATIENT)
Dept: UROLOGY | Facility: MEDICAL CENTER | Age: 41
End: 2021-07-16

## 2021-07-16 NOTE — TELEPHONE ENCOUNTER
Complaint/Diagnosis:Flank Pain    Insurance:[facility]    History of Cancer:no    Previous urologist:Dr Philippe/Lori Foster 2017    Outside testing/where:no    If yes,what kind:no    Records requested/where:[facility]    Preferred location:[facility]/[place]

## 2021-07-16 NOTE — TELEPHONE ENCOUNTER
Patient states had renal calculi in 2017  Last weekend had sudden symptoms similar symptoms to before to 2017  Went to urgent care on Saturday was given Flomax  Had blood in urine which has no resolved and urine is clear in color  No imaging studies were completed  Is having right flank pain which has subsided  Appt given at our [facility] on 8/10/21  Instructed patient to contact PCP with an questions or increase in pain  Ed precautions reviewed  Patient verbalized understanding

## 2021-07-16 NOTE — TELEPHONE ENCOUNTER
Npt calling with flank pain states was prior pt ,records indicate 2017 please review and contact pt with recommendation

## 2021-08-06 ENCOUNTER — TELEPHONE (OUTPATIENT)
Dept: URGENT CARE | Facility: MEDICAL CENTER | Age: 41
End: 2021-08-06

## 2021-08-06 ENCOUNTER — OFFICE VISIT (OUTPATIENT)
Dept: URGENT CARE | Facility: MEDICAL CENTER | Age: 41
End: 2021-08-06
Payer: COMMERCIAL

## 2021-08-06 VITALS
DIASTOLIC BLOOD PRESSURE: 82 MMHG | HEIGHT: 70 IN | HEART RATE: 77 BPM | BODY MASS INDEX: 29.78 KG/M2 | SYSTOLIC BLOOD PRESSURE: 120 MMHG | RESPIRATION RATE: 16 BRPM | WEIGHT: 208 LBS | OXYGEN SATURATION: 98 % | TEMPERATURE: 98 F

## 2021-08-06 DIAGNOSIS — J02.9 SORE THROAT: ICD-10-CM

## 2021-08-06 DIAGNOSIS — B34.9 ACUTE VIRAL SYNDROME: Primary | ICD-10-CM

## 2021-08-06 LAB
S PYO AG THROAT QL: NEGATIVE
SARS-COV-2 RNA RESP QL NAA+PROBE: NEGATIVE

## 2021-08-06 PROCEDURE — 99213 OFFICE O/P EST LOW 20 MIN: CPT | Performed by: PHYSICIAN ASSISTANT

## 2021-08-06 PROCEDURE — 87070 CULTURE OTHR SPECIMN AEROBIC: CPT | Performed by: PHYSICIAN ASSISTANT

## 2021-08-06 PROCEDURE — 87880 STREP A ASSAY W/OPTIC: CPT | Performed by: PHYSICIAN ASSISTANT

## 2021-08-06 PROCEDURE — U0003 INFECTIOUS AGENT DETECTION BY NUCLEIC ACID (DNA OR RNA); SEVERE ACUTE RESPIRATORY SYNDROME CORONAVIRUS 2 (SARS-COV-2) (CORONAVIRUS DISEASE [COVID-19]), AMPLIFIED PROBE TECHNIQUE, MAKING USE OF HIGH THROUGHPUT TECHNOLOGIES AS DESCRIBED BY CMS-2020-01-R: HCPCS | Performed by: PHYSICIAN ASSISTANT

## 2021-08-06 PROCEDURE — U0005 INFEC AGEN DETEC AMPLI PROBE: HCPCS | Performed by: PHYSICIAN ASSISTANT

## 2021-08-06 NOTE — PROGRESS NOTES
[facility]        NAME: Delmis Streeter is a 36 y o  male  : 1980    MRN: [id number]  DATE: 2021  TIME: 10:22 AM    Assessment and Plan   Acute viral syndrome [B34 9]  1  Acute viral syndrome  POCT rapid strepA    Throat culture    Novel Coronavirus (Covid-19),PCR [facility] - Office Collection     Rapid strep was negative will send for culture  COVID 19 testing completed  Patient Instructions       Patient was educated on [place] [address]  Patient was told to stay quarantined until results are back  Patient was educated on hydration  Patient was told to go to ED if he begins to have chest pain, shortness of breath or high grade fevers  Chief Complaint     Chief Complaint   Patient presents with    Sore Throat     Pt here for sore throat, HA, nasal congestion x 1 day  History of Present Illness       Patient is here today complaining of fatigue, headaches, sore throat, and congestion  Patient reports symptoms started two days  Patient was vaccinated with Roddy Chaparro in 2021      Review of Systems   Review of Systems   Constitutional: Positive for fatigue  HENT: Positive for congestion and sore throat  Respiratory: Negative  Musculoskeletal: Negative  Neurological: Positive for headaches  Psychiatric/Behavioral: Negative            Current Medications       Current Outpatient Medications:     Multiple Vitamin (MULTIVITAMIN) tablet, Take 1 tablet by mouth daily, Disp: , Rfl:     Omega 3 1200 MG CAPS, Take by mouth, Disp: , Rfl:     Probiotic Product (SOLUBLE FIBER/PROBIOTICS PO), Take 1 capsule by mouth daily, Disp: , Rfl:     Current Allergies     Allergies as of 2021    (No Known Allergies)            The following portions of the patient's history were reviewed and updated as appropriate: allergies, current medications, past family history, past medical history, past social history, past surgical history and problem list      Past Medical History: Diagnosis Date    Anxiety     h/o    Calculus of ureter     Chronic tension headaches     Kidney stone     passed on own     Kidney stones     Panic attacks     h/o    PONV (postoperative nausea and vomiting)        Past Surgical History:   Procedure Laterality Date    KNEE SURGERY Bilateral     multiple,last assessed 1/2/17    REPAIR KNEE LIGAMENT      SCALP EXCISION N/A 2/23/2018    Procedure: EXCISION SCALP MASS;  Surgeon: Reyes Higginbotham MD;  [facility];  Service: Plastics    WISDOM TOOTH EXTRACTION      one extracted       Family History   Problem Relation Age of Onset    Other Father         arteriosclerosis of autologous arterial coronary artery bypass graft    Nephrolithiasis Father         calcium    Gout Father          Medications have been verified  Objective   /82   Pulse 77   Temp 98 °F (36 7 °C)   Resp 16   Ht 5' 10" (1 778 m)   Wt 94 3 kg (208 lb)   SpO2 98%   BMI 29 84 kg/m²   No LMP for male patient  Physical Exam     Physical Exam  Constitutional:       Appearance: He is normal weight  HENT:      Head: Normocephalic  Comments: No pain over frontal and maxillary sinus     Right Ear: Tympanic membrane, ear canal and external ear normal       Left Ear: Tympanic membrane, ear canal and external ear normal       Nose: Nose normal       Mouth/Throat:      Mouth: Mucous membranes are moist       Pharynx: Posterior oropharyngeal erythema present  No oropharyngeal exudate  Eyes:      Extraocular Movements: Extraocular movements intact  Pupils: Pupils are equal, round, and reactive to light  Neck:      Comments: No palpable lymph nodes  Cardiovascular:      Rate and Rhythm: Normal rate and regular rhythm  Heart sounds: Normal heart sounds  Pulmonary:      Effort: Pulmonary effort is normal       Breath sounds: Normal breath sounds  Abdominal:      Palpations: Abdomen is soft     Neurological:      Mental Status: He is alert and oriented to person, place, and time     Psychiatric:         Mood and Affect: Mood normal          Behavior: Behavior normal

## 2021-08-06 NOTE — PROGRESS NOTES
Assessment and plan:     Kidney stone  · Send stone for analysis  · Reviewed AUA dietary recommendations and hydration goals  · Schedule US and kub  · Stone risk profile, pth, bmp,uric acid  · Follow up 1 year      KRISTY Ham    History of Present Illness     Claudette Andrea is a 36 y o  New patient who presents for  Flank pain and dysuria with a history of nephrolithiasis  The most recent imaging I could find on file was from 2016 at which point he had bilateral subcentimeter nonobstructing renal calculi  He presented to the urgent care for dysuria  On 07/11/2021 he was also noted that blood in his urine at that time  He was discharged home on Flomax  No imaging was obtained  He has been having flank pain over the past month  He passed a stone and brought that with him today  He was previously evaluated by our service many years ago  He was told that he had calcium oxylate stones in 2017  He reports passing 2 kidney stones since that time  He is not interested in nephrology appointment at this time      Laboratory     Lab Results   Component Value Date    BUN 17 09/18/2020    CREATININE 1 02 09/18/2020       No components found for: GFR    Lab Results   Component Value Date    CALCIUM 9 9 09/18/2020    K 5 1 09/18/2020    CO2 25 09/18/2020     09/18/2020       Lab Results   Component Value Date    WBC 7 9 09/18/2020    HGB 16 7 09/18/2020    HCT 49 5 09/18/2020    MCV 88 6 09/18/2020     09/18/2020       No results found for: PSA    Recent Results (from the past 1 hour(s))   POCT urine dip    Collection Time: 08/10/21  8:32 AM   Result Value Ref Range    LEUKOCYTE ESTERASE,UA -     NITRITE,UA -     SL AMB POCT UROBILINOGEN -     POCT URINE PROTEIN -      PH,UA 5 0     BLOOD,UA -     SPECIFIC GRAVITY,UA 1 020     KETONES,UA -     BILIRUBIN,UA -     GLUCOSE, UA -      COLOR,UA yellow     CLARITY,UA clear        @RESULT(URINEMICROSCOPIC)@    @RESULT(URINECULTURE)@    Radiology Interface, Rad Results In - 04/01/2016  3:32 AM EDT   Clinical History: Left flank pain and dysuria  Left renal calculus  Comparison: No previous abdominal CT  Comment: CT of the abdomen and pelvis was performed without intravenous   contrast      Abdomen:   Lung bases clear  Liver is enlarged measuring 20 cm craniocaudally  Diffuse decrease in hepatic   density with relative sparing along the gallbladder fossa is compatible with   fatty infiltration  Spleen, pancreas, and adrenal glands are unremarkable,   within the limitations of this unenhanced study  No calcified gallstones  No   biliary ductal dilatation  3 and 2 mm nonobstructing right interpolar to lower pole renal calculi  No   right ureteral calculus or right hydroureteronephrosis  2 tiny 1-2 mm   nonobstructing left lower pole renal calculi  Suggestion of subtle tiny 1 mm   left mid ureteral calculus at the sacral promontory (image 131, series 2), but   without upstream hydroureteronephrosis to suggest obstructive uropathy  Small and large bowel normal in caliber  Normal appendix  No retroperitoneal or mesenteric adenopathy  No free fluid or free   intraperitoneal air the abdomen  Abdominal aorta normal in caliber, with mild atherosclerotic calcification  Pelvis:   No pelvic adenopathy  No bladder calculus  Scattered calcified pelvic   phleboliths  Osseous structures are intact  IMPRESSION:   Impression:   1  Bilateral subcentimeter nonobstructing renal calculi as described  There is   suggestion of a subtle tiny 1 mm left mid ureteral calculus at the sacral   promontory, without upstream hydronephrosis to indicate obstructive uropathy  2  Hepatomegaly with diffuse hepatic fatty infiltration       Date: 04/01/16   Received From: [address] [place] [address]        Review of Systems     Review of Systems   Constitutional: Negative for activity change, appetite change, chills, fatigue, fever and unexpected weight change  HENT: Negative for facial swelling  Eyes: Negative for discharge  Respiratory: Negative  Negative for cough and shortness of breath  Cardiovascular: Negative for chest pain and leg swelling  Gastrointestinal: Negative  Negative for abdominal distention, abdominal pain, constipation, diarrhea, nausea and vomiting  Endocrine: Negative  Genitourinary: Negative  Negative for decreased urine volume, difficulty urinating, dysuria, enuresis, flank pain, frequency, genital sores, hematuria and urgency  Musculoskeletal: Negative for back pain and myalgias  Skin: Negative for pallor and rash  Allergic/Immunologic: Negative  Negative for immunocompromised state  Neurological: Negative for facial asymmetry and speech difficulty  Psychiatric/Behavioral: Negative for agitation and confusion  Allergies     No Known Allergies    Physical Exam     Physical Exam  Vitals reviewed  Constitutional:       General: He is not in acute distress  Appearance: Normal appearance  He is normal weight  He is not ill-appearing, toxic-appearing or diaphoretic  HENT:      Head: Normocephalic and atraumatic  Eyes:      General: No scleral icterus  Cardiovascular:      Rate and Rhythm: Normal rate  Pulmonary:      Effort: Pulmonary effort is normal  No respiratory distress  Abdominal:      General: Abdomen is flat  There is no distension  Palpations: Abdomen is soft  Tenderness: There is no abdominal tenderness  There is no right CVA tenderness, left CVA tenderness, guarding or rebound  Musculoskeletal:         General: No swelling  Cervical back: Normal range of motion  Right lower leg: No edema  Left lower leg: No edema  Skin:     General: Skin is warm and dry  Coloration: Skin is not jaundiced or pale  Findings: No rash  Neurological:      General: No focal deficit present  Mental Status: He is alert and oriented to person, place, and time  Gait: Gait normal    Psychiatric:         Mood and Affect: Mood normal          Behavior: Behavior normal          Thought Content:  Thought content normal          Judgment: Judgment normal          Vital Signs     Vitals:    08/10/21 0823   BP: 122/80   Weight: 94 3 kg (208 lb)   Height: 5' 10" (1 778 m)       Current Medications       Current Outpatient Medications:     Multiple Vitamin (MULTIVITAMIN) tablet, Take 1 tablet by mouth daily, Disp: , Rfl:     Omega 3 1200 MG CAPS, Take by mouth, Disp: , Rfl:     Probiotic Product (SOLUBLE FIBER/PROBIOTICS PO), Take 1 capsule by mouth daily, Disp: , Rfl:     Active Problems     Patient Active Problem List   Diagnosis    Hyperlipidemia    Tear of lateral cartilage or meniscus of knee, current    Kidney stone    Sprain of cruciate ligament of knee    Subcutaneous mass       Past Medical History     Past Medical History:   Diagnosis Date    Anxiety     h/o    Calculus of ureter     Chronic tension headaches     Kidney stone     passed on own     Kidney stones     Panic attacks     h/o    PONV (postoperative nausea and vomiting)        Surgical History     Past Surgical History:   Procedure Laterality Date    KNEE SURGERY Bilateral     multiple,last assessed 1/2/17    REPAIR KNEE LIGAMENT      SCALP EXCISION N/A 2/23/2018    Procedure: EXCISION SCALP MASS;  Surgeon: Viv Crooks MD;  [facility];  Service: Plastics    WISDOM TOOTH EXTRACTION      one extracted       Family History     Family History   Problem Relation Age of Onset    Other Father         arteriosclerosis of autologous arterial coronary artery bypass graft    Nephrolithiasis Father         calcium    Gout Father        Social History     Social History     Social History     Tobacco Use   Smoking Status Never Smoker   Smokeless Tobacco Never Used   Tobacco Comment           Past Surgical History:   Procedure Laterality Date    KNEE SURGERY Bilateral     multiple,last assessed 1/2/17    REPAIR KNEE LIGAMENT      SCALP EXCISION N/A 2/23/2018    Procedure: EXCISION SCALP MASS;  Surgeon: Anitha Chicas MD;  [facility];  Service: Plastics    WISDOM TOOTH EXTRACTION      one extracted         The following portions of the patient's history were reviewed and updated as appropriate: allergies, current medications, past family history, past medical history, past social history, past surgical history and problem list    Please note :  Voice dictation software has been used to create this document  There may be inadvertent transcription errors      [id number] [address]

## 2021-08-06 NOTE — PATIENT INSTRUCTIONS
Patient was educated on [place] [address]  Patient was told to stay quarantined until results are back  Patient was educated on hydration  Patient was told to go to ED if he begins to have chest pain, shortness of breath or high grade fevers  COVID-19 (Coronavirus Disease 2019)   WHAT YOU NEED TO KNOW:   Coronavirus disease 2019 (COVID-19) is the disease caused by a coronavirus first discovered in December 2019  Coronaviruses generally cause upper respiratory (nose, throat, and lung) infections, such as a cold  The new virus spreads quickly and easily  The virus can be spread starting 2 days before symptoms even begin  The virus has also changed into several new forms (called variants) since it was discovered  The variants may be more contagious (easily spread) than the original form  Some may also cause more severe illness than others  It is important to follow local, national, and worldwide measures to protect yourself and others from infection  DISCHARGE INSTRUCTIONS:   If you think you or someone you know may be infected:  Do the following to protect others:  · If emergency care is needed,  tell the  about the possible infection, or call ahead and tell the emergency department  · Call a healthcare provider  for instructions if symptoms are mild  Anyone who may be infected should not  arrive without calling first  The provider will need to protect staff members and other patients  · The person who may be infected needs to wear a face covering  while getting medical care  This will help lower the risk of infecting others  Coverings are not used for anyone who is younger than 2 years, has breathing problems, or cannot remove it  The provider can give you instructions for anyone who cannot wear a covering      Call your local emergency number [phone] in the [address] [place] [address],[address]) or an emergency department if:   · You have trouble breathing or shortness of breath at rest     · You have chest pain or pressure that lasts longer than 5 minutes  · You become confused or hard to wake  · Your lips or face are blue  · You have a fever of 104°F (40°C) or higher  Call your doctor if:   · You do not  have symptoms of COVID-19 but had close physical contact within 14 days with someone who tested positive  · You have questions or concerns about your condition or care  Medicines: You may need any of the following for mild symptoms:  · Decongestants  help reduce nasal congestion and help you breathe more easily  If you take decongestant pills, they may make you feel restless or cause problems with your sleep  Do not use decongestant sprays for more than a few days  · Cough suppressants  help reduce coughing  Ask your healthcare provider which type of cough medicine is best for you  · Acetaminophen  decreases pain and fever  It is available without a doctor's order  Ask how much to take and how often to take it  Follow directions  Read the labels of all other medicines you are using to see if they also contain acetaminophen, or ask your doctor or pharmacist  Acetaminophen can cause liver damage if not taken correctly  Do not use more than 4 grams (4,000 milligrams) total of acetaminophen in one day  · NSAIDs , such as ibuprofen, help decrease swelling, pain, and fever  NSAIDs can cause stomach bleeding or kidney problems in certain people  If you take blood thinner medicine, always ask your healthcare provider if NSAIDs are safe for you  Always read the medicine label and follow directions  · Take your medicine as directed  Contact your healthcare provider if you think your medicine is not helping or if you have side effects  Tell him or her if you are allergic to any medicine  Keep a list of the medicines, vitamins, and herbs you take  Include the amounts, and when and why you take them  Bring the list or the pill bottles to follow-up visits  Carry your medicine list with you in case of an emergency      How the 2019 coronavirus spreads: The following are ways the virus is thought to spread, but more information may be coming:  · Droplets are the main way all coronaviruses spread  The virus travels in droplets that form when a person talks, coughs, or sneezes  The droplets can also float in the air for minutes or hours  Infection happens when you breathe in the droplets or get them in your eyes or nose  Close personal contact with an infected person increases your risk for infection  This means being within 6 feet (2 meters) of the person for at least 15 minutes over 24 hours  · Person-to-person contact can spread the virus  For example, a person with the virus on his or her hands can spread it by shaking hands with someone  · The virus can stay on objects and surfaces for a short time  You may become infected by touching the object or surface and then touching your eyes or mouth  · An infected animal may be able to infect a person who touches it  This may happen at live markets or on a farm  Help lower the risk for COVID-19:  The best way to prevent infection is to avoid anyone who is infected, but this can be hard to do  An infected person can spread the virus before signs or symptoms begin, or even if signs or symptoms never develop  The following can help lower the risk for infection:      · Wash your hands often throughout the day  Use soap and water  Rub your soapy hands together, lacing your fingers, for at least 20 seconds  Rinse with warm, running water  Dry your hands with a clean towel or paper towel  Use hand  that contains alcohol if soap and water are not available  Teach children how to wash their hands and use hand   · Cover sneezes and coughs  Turn your face away and cover your mouth and nose with a tissue  Throw the tissue away  Use the bend of your arm if a tissue is not available  Then wash your hands well with soap and water or use hand    Teach children how to cover a cough or sneeze  · Wear a face covering (mask) around anyone who does not live in your home  Use a cloth covering with at least 2 layers  You can also create layers by putting a cloth covering over a disposable non-medical mask  Cover your mouth and your nose  The covering should fit snugly against the bridge of your nose  Securely fasten it under your chin and on the sides of your face  Do not  wear a plastic face shield instead of a covering  Continue social distancing and washing your hands often  A face covering is not a substitute for social distancing safety measures  · Follow worldwide, national, and local social distancing guidelines  Keep at least 6 feet (2 meters) between you and others  Also keep this distance from anyone who comes to your home, such as someone making a delivery  Wear a face covering while you are around others  You will need to wear a covering in restaurants, stores, and other public buildings  You will also need a covering on mass transit, such as a bus, subway, or airplane  Remember to use a covering made from thick material or wear 2 coverings together  · Make a habit of not touching your face  If you get the virus on your hands, you can transfer it to your eyes, nose, or mouth and become infected  You can also transfer it to objects, surfaces, or people  Do not touch your eyes, nose, or mouth without washing your hands first     · Clean and disinfect high-touch surfaces and objects often  Use disinfecting wipes, or make a solution of 4 teaspoons of bleach in 1 quart (4 cups) of water  Clean and disinfect even if you think no one living in or coming to your home is infected with the virus  · Ask about vaccines you may need  Get a COVID-19 vaccine when it is available to you  The current vaccines are given as a shot in 1 or 2 doses  Get the influenza (flu) vaccine as soon as recommended each year, usually starting in September or October   Get the pneumonia vaccine if recommended  Follow social distancing guidelines:  National and local social distancing rules vary  Rules may change over time as restrictions are lifted  Restrictions may return if an outbreak happens where you live  It is important to know and follow all current social distancing rules in your area  The following are general guidelines:  · Stay home if you are sick or think you may have COVID-19  It is important to stay home if you are waiting for a testing appointment or for test results  Even if you do not have symptoms, you can pass the virus to others  · Limit trips out of your home  Have food, medicines, and other supplies delivered and left at your door or other area, if possible  Plan trips out of your home so you make the fewest stops possible to limit close personal contact  Keep track of places you go  This will help contact tracers notify others if you become infected  · Avoid close physical contact with anyone who does not live in your home  Do not shake hands with, hug, or kiss a person as a greeting  If you must use public transportation (such as a bus or subway), try to sit or stand away from others  Only allow necessary people into your home  Wear your face covering, and remind others to wear a face covering  Remind them to wash their hands when they arrive and before they leave  Do not  let someone into your home or go to someone's home just to visit  Even if you both do not feel sick, the virus can pass from one of you to the other  · Avoid in-person gatherings and crowds  Gatherings or crowds of 10 or more individuals can cause the virus to spread  Avoid places such as [place], beaches, sporting events, and tourist attractions  For events such as parties, holiday meals, [employer] services, and conferences, attend virtually (on a computer), if possible  · Ask your healthcare provider for other ways to have appointments    Some providers offer phone, video, or other types of appointments  You may also be able to get prescriptions for a few months of your medicines at a time  · Stay safe if you must go out to work  Keep physical distance between you and other workers as much as possible  Follow your employer's rules so everyone stays safe  If you have COVID-19 and are recovering at home,  healthcare providers will give you specific instructions to follow  The following are general guidelines to remind you how to keep others safe until you are well:  · Wash your hands often  Use soap and water as much as possible  Use hand  that contains alcohol if soap and water are not available  Dry your hands with a clean towel or paper towel  Do not share towels with anyone  If you use paper towels, throw them away in a lined trash can kept in your room or area  Use a covered trash can, if possible  · Do not go out of your home unless it is necessary  Ask someone who is not infected to go out for groceries or supplies, or have them delivered  Do not go to your healthcare provider's office without an appointment  · Only have close physical contact with a person giving direct care, or a baby or child you must care for  Family members and friends should not visit you  If possible, stay in a separate area or room of your home if you live with others  No one should go into the area or room except to give you care  You can visit with others by phone, video chat, e-mail, or similar systems  · Wear a face covering while others are near you  This can help prevent droplets from spreading the virus when you talk, sneeze, or cough  Put the covering on before anyone comes into your room or area  Remind the person to cover his or her nose and mouth before coming in to provide care for you  · Do not share items  Do not share dishes, towels, or other items with anyone  Items need to be washed after you use them  · Protect your baby    Some newborns have tested positive for the virus  It is not known if they became infected before or after birth  The highest risk is when a  has close contact with an infected person  If you are pregnant or breastfeeding, talk to your healthcare provider or obstetrician about any concerns you have  He or she will tell you when to bring your baby in for check-ups and vaccines  He or she will also tell you what to do if you think your baby was infected with the coronavirus  Wash your hands and put on a clean face covering before you breastfeed or care for your baby  · Do not handle live animals unless it is necessary  Some animals, including pets, have been infected with the new coronavirus  Ask someone who is not infected to take care of your pet until you are well  If you must care for a pet, wear a face covering  Wash your hands before and after you give care  Talk to your healthcare provider about how to keep a service animal safe, if needed  · Follow directions from your healthcare provider for being around others after you recover  It is not known if or for how long a recovered person can pass the virus to others  Your provider may give you instructions, such as continuing social distancing and wearing a face covering  He or she will tell you when it is okay to be around others again  This may be 10 to 20 days after symptoms started or you had a positive test  Most symptoms will also need to be gone  Your provider will give you more information  Follow up with your doctor as directed:  Write down your questions so you remember to ask them during your visits  For more information:   · Centers for Disease Control and Prevention  [address] , [address]  Phone: 0- 509 - [phone]  Web Address: [employer]    © [address] [place] [address]  Information is for End User's use only and may not be sold, redistributed or otherwise used for commercial purposes   All illustrations and images included in CareNotes® are the copyrighted property of [employer]  or Mary Ellen Levi   The above information is an  only  It is not intended as medical advice for individual conditions or treatments  Talk to your doctor, nurse or pharmacist before following any medical regimen to see if it is safe and effective for you

## 2021-08-08 ENCOUNTER — TELEPHONE (OUTPATIENT)
Dept: URGENT CARE | Facility: MEDICAL CENTER | Age: 41
End: 2021-08-08

## 2021-08-08 LAB — BACTERIA THROAT CULT: NORMAL

## 2021-08-10 ENCOUNTER — OFFICE VISIT (OUTPATIENT)
Dept: UROLOGY | Facility: CLINIC | Age: 41
End: 2021-08-10
Payer: COMMERCIAL

## 2021-08-10 ENCOUNTER — APPOINTMENT (OUTPATIENT)
Dept: LAB | Facility: MEDICAL CENTER | Age: 41
End: 2021-08-10
Payer: COMMERCIAL

## 2021-08-10 VITALS
HEIGHT: 70 IN | WEIGHT: 208 LBS | BODY MASS INDEX: 29.78 KG/M2 | DIASTOLIC BLOOD PRESSURE: 80 MMHG | SYSTOLIC BLOOD PRESSURE: 122 MMHG

## 2021-08-10 DIAGNOSIS — N20.0 KIDNEY STONE: ICD-10-CM

## 2021-08-10 DIAGNOSIS — R30.0 DYSURIA: Primary | ICD-10-CM

## 2021-08-10 LAB
ANION GAP SERPL CALCULATED.3IONS-SCNC: 6 MMOL/L (ref 4–13)
BUN SERPL-MCNC: 19 MG/DL (ref 5–25)
CALCIUM SERPL-MCNC: 9.6 MG/DL (ref 8.3–10.1)
CHLORIDE SERPL-SCNC: 104 MMOL/L (ref 100–108)
CO2 SERPL-SCNC: 27 MMOL/L (ref 21–32)
CREAT SERPL-MCNC: 1 MG/DL (ref 0.6–1.3)
GFR SERPL CREATININE-BSD FRML MDRD: 94 ML/MIN/1.73SQ M
GLUCOSE P FAST SERPL-MCNC: 96 MG/DL (ref 65–99)
POTASSIUM SERPL-SCNC: 4.6 MMOL/L (ref 3.5–5.3)
PTH-INTACT SERPL-MCNC: 41.9 PG/ML (ref 18.4–80.1)
SL AMB  POCT GLUCOSE, UA: NORMAL
SL AMB LEUKOCYTE ESTERASE,UA: NORMAL
SL AMB POCT BILIRUBIN,UA: NORMAL
SL AMB POCT BLOOD,UA: NORMAL
SL AMB POCT CLARITY,UA: CLEAR
SL AMB POCT COLOR,UA: YELLOW
SL AMB POCT KETONES,UA: NORMAL
SL AMB POCT NITRITE,UA: NORMAL
SL AMB POCT PH,UA: 5
SL AMB POCT SPECIFIC GRAVITY,UA: 1.02
SL AMB POCT URINE PROTEIN: NORMAL
SL AMB POCT UROBILINOGEN: NORMAL
SODIUM SERPL-SCNC: 137 MMOL/L (ref 136–145)
URATE SERPL-MCNC: 8.1 MG/DL (ref 4.2–8)

## 2021-08-10 PROCEDURE — 80048 BASIC METABOLIC PNL TOTAL CA: CPT

## 2021-08-10 PROCEDURE — 84550 ASSAY OF BLOOD/URIC ACID: CPT

## 2021-08-10 PROCEDURE — 1036F TOBACCO NON-USER: CPT | Performed by: NURSE PRACTITIONER

## 2021-08-10 PROCEDURE — 99203 OFFICE O/P NEW LOW 30 MIN: CPT | Performed by: NURSE PRACTITIONER

## 2021-08-10 PROCEDURE — 81002 URINALYSIS NONAUTO W/O SCOPE: CPT | Performed by: NURSE PRACTITIONER

## 2021-08-10 PROCEDURE — 82360 CALCULUS ASSAY QUANT: CPT | Performed by: NURSE PRACTITIONER

## 2021-08-10 PROCEDURE — 83970 ASSAY OF PARATHORMONE: CPT

## 2021-08-10 PROCEDURE — 3008F BODY MASS INDEX DOCD: CPT | Performed by: NURSE PRACTITIONER

## 2021-08-10 PROCEDURE — 36415 COLL VENOUS BLD VENIPUNCTURE: CPT

## 2021-08-10 NOTE — ASSESSMENT & PLAN NOTE
· Send stone for analysis  · Reviewed AUA dietary recommendations and hydration goals  · Schedule US and kub  · Stone risk profile, pth, bmp,uric acid  · Follow up 1 year

## 2021-08-10 NOTE — PATIENT INSTRUCTIONS
Dietary Management of Kidney Stone Disease    The dietary recommendations for most people who make kidney stones (especially the most common calcium oxalate stones) are uncomplicated and are not too tedious or bland  Most importantly, the following recommendations also promote better health for a variety of reasons  FLUIDS:  The single most important change for the majority patients is the need to greatly increase fluid intake  You should at least produce two liters (about two quarts) of urine each day  Depending on the heat outdoors and your level of physical activity, this usually means consuming ten, 10 ounce glasses (100 ounces) of fluid per day  Water is always a good choice, but other drinks including tea, coffee, soda, and juice are also allowed as long as no one beverage becomes the sole source of fluid  CALCIUM:  There is excellent evidence that calcium should not be avoided, but instead moderated  A range of 600 to 1,100 mg of calcium per day, especially consumed at meals is probably a reasonable target  (i e  2-3 dairy servings per day) This might include small servings of yogurt, milk or ice cream   This amount helps avoid over-absorption of oxalate from the digestive tract and also allows for healthy bone maintenance  SODIUM (SALT): Too much salt in your diet (both from the shaker and in the prepared foods that we buy) is bad for your blood pressure, bad for your heart, and also increases the amount of calcium in your urine  A reasonable sodium restriction to 2,000-2,500mg/day (about the amount in one teaspoon) is an excellent target  You should get into the habit of reading the Nutrients labels on all the foods that you eat and watch out for the foods that have a high sodium content (snack foods, smoked or processed foods, caned foods)  Fresh and frozen foods usually have the least amount of sodium      PROTEIN:  High protein diets from animal meat (beef, chicken, pork, fish) also increases the rate of kidney stone formation and is equally unhealthy for your heart  All patients should moderate their meat intake to 3-7 ounces per day, and particularly stay away from red meat protein  OXALATE:  Most stone-formers should avoid heavy intake of oxalate-rich foods  These include green roughage (spinach, mustard, kale), strawberries, chocolate, tea, iced tea, and nuts  In addition, heavy, excess doses of Vitamin C can also produce surges in urinary oxalate levels and should be avoided  ** THESE FOODS ARE HIGH IN OXALATE - TRY TO LIMIT HOW MUCH OF THESE YOU EAT:  Coke and Pepsi  Nuts  Dark Leafy Greens:     Spinach and Kale, Rhubarb, Chard  Asparagus  Beets  Sweet potatoes   Blueberries, Strawberries   Dark tea (Green tea is okay)  Tofu      DRINK 3 QUARTS (96 Oz ) LIQUIDS EACH DAY - ALL LIQUIDS COUNT        ADD LEMON JUICE 3 OZ  DAILY - Fresh squeezed or lemon juice concentrate - Not [employer]aid, [place] ([facility] [place], Crystal Lite, etc         ** Recipe for BRE'S OLDJORDI TYME LEMONADE:         One ounce of lemon juice, glass of water, sweetener of your choice    Coffee is okay! Cranberry juice is good to prevent infections, but does not help for stones  BARE-BONES RECOMMENDATIONS:  1  Fluids, fluids, fluids  2  Low salt diet (your primary care doctor will love you)  3  Moderate red meat intake  4  Moderate calcium (dairy products), especially with meals

## 2021-08-16 ENCOUNTER — APPOINTMENT (OUTPATIENT)
Dept: RADIOLOGY | Facility: MEDICAL CENTER | Age: 41
End: 2021-08-16
Payer: COMMERCIAL

## 2021-08-16 ENCOUNTER — HOSPITAL ENCOUNTER (OUTPATIENT)
Dept: ULTRASOUND IMAGING | Facility: MEDICAL CENTER | Age: 41
Discharge: HOME | End: 2021-08-16
Payer: COMMERCIAL

## 2021-08-16 ENCOUNTER — APPOINTMENT (OUTPATIENT)
Dept: LAB | Facility: MEDICAL CENTER | Age: 41
End: 2021-08-16
Payer: COMMERCIAL

## 2021-08-16 DIAGNOSIS — N20.0 KIDNEY STONE: ICD-10-CM

## 2021-08-16 PROCEDURE — 82340 ASSAY OF CALCIUM IN URINE: CPT | Performed by: NURSE PRACTITIONER

## 2021-08-16 PROCEDURE — 81003 URINALYSIS AUTO W/O SCOPE: CPT | Performed by: NURSE PRACTITIONER

## 2021-08-16 PROCEDURE — 74018 RADEX ABDOMEN 1 VIEW: CPT

## 2021-08-16 PROCEDURE — 84133 ASSAY OF URINE POTASSIUM: CPT | Performed by: NURSE PRACTITIONER

## 2021-08-16 PROCEDURE — 82570 ASSAY OF URINE CREATININE: CPT | Performed by: NURSE PRACTITIONER

## 2021-08-16 PROCEDURE — 82140 ASSAY OF AMMONIA: CPT | Performed by: NURSE PRACTITIONER

## 2021-08-16 PROCEDURE — 84392 ASSAY OF URINE SULFATE: CPT | Performed by: NURSE PRACTITIONER

## 2021-08-16 PROCEDURE — 76770 US EXAM ABDO BACK WALL COMP: CPT

## 2021-08-16 PROCEDURE — 82507 ASSAY OF CITRATE: CPT | Performed by: NURSE PRACTITIONER

## 2021-08-16 PROCEDURE — 83735 ASSAY OF MAGNESIUM: CPT | Performed by: NURSE PRACTITIONER

## 2021-08-16 PROCEDURE — 84300 ASSAY OF URINE SODIUM: CPT | Performed by: NURSE PRACTITIONER

## 2021-08-16 PROCEDURE — 83945 ASSAY OF OXALATE: CPT | Performed by: NURSE PRACTITIONER

## 2021-08-16 PROCEDURE — 82131 AMINO ACIDS SINGLE QUANT: CPT | Performed by: NURSE PRACTITIONER

## 2021-08-16 PROCEDURE — 84105 ASSAY OF URINE PHOSPHORUS: CPT | Performed by: NURSE PRACTITIONER

## 2021-08-16 PROCEDURE — 84560 ASSAY OF URINE/URIC ACID: CPT | Performed by: NURSE PRACTITIONER

## 2021-08-16 PROCEDURE — 83935 ASSAY OF URINE OSMOLALITY: CPT | Performed by: NURSE PRACTITIONER

## 2021-08-16 PROCEDURE — 82436 ASSAY OF URINE CHLORIDE: CPT | Performed by: NURSE PRACTITIONER

## 2021-08-17 LAB
CALCIUM OXALATE DIHYDRATE MFR STONE IR: 30 %
COLOR STONE: NORMAL
COM MFR STONE: 70 %
COMMENT-STONE3: NORMAL
COMPOSITION: NORMAL
LABORATORY COMMENT REPORT: NORMAL
PHOTO: NORMAL
SIZE STONE: NORMAL MM
SPEC SOURCE SUBJ: NORMAL
STONE ANALYSIS-IMP: NORMAL
WT STONE: 24 MG

## 2021-08-27 LAB
AMMONIA 24H UR-MRATE: 37 MEQ/24 HR
AMMONIA UR-SCNC: ABNORMAL UG/DL
CA H2 PHOS DIHYD CRY URNS QL MICRO: 1.57 RATIO (ref 0–3)
CALCIUM 24H UR-MCNC: 11.5 MG/DL
CALCIUM 24H UR-MRATE: 293.3 MG/24 HR (ref 100–300)
CHLORIDE 24H UR-SCNC: 36 MMOL/L
CHLORIDE 24H UR-SRATE: 92 MMOL/24 HR (ref 110–250)
CITRATE 24H UR-MCNC: 91 MG/L
CITRATE 24H UR-MRATE: 232 MG/24 HR (ref 320–1240)
COM CRY STONE QL IR: 3.09 RATIO (ref 0–6)
CREAT 24H UR-MCNC: 49.9 MG/DL
CREAT 24H UR-MRATE: 1272.5 MG/24 HR (ref 1000–2000)
CYSTINE 24H UR-MCNC: 3.52 MG/L
CYSTINE 24H UR-MRATE: 8.98 MG/24 HR (ref 10–100)
MAGNESIUM 24H UR-MRATE: 102 MG/24 HR (ref 12–293)
MAGNESIUM UR-MCNC: 4 MG/DL
NA URATE CRY STONE QL IR: 0.98 RATIO (ref 0–4)
OSMOLALITY UR: 338 MOSMOL/KG (ref 300–900)
OXALATE 24H UR-MRATE: 18 MG/24 HR (ref 7–44)
OXALATE UR-MCNC: 7 MG/L
PH 24H UR: 6.2 [PH]
PHOSPHATE 24H UR-MCNC: 32.3 MG/DL
PHOSPHATE 24H UR-MRATE: 823.7 MG/24 HR (ref 400–1300)
PLEASE NOTE (STONE RISK): ABNORMAL
POTASSIUM 24H UR-SCNC: 48.2 MMOL/24 HR (ref 25–125)
POTASSIUM UR-SCNC: 18.9 MMOL/L
PRESERVED URINE: 2550 ML/24 HR (ref 800–1800)
SODIUM 24H UR-SCNC: 40 MMOL/L
SODIUM 24H UR-SRATE: 102 MMOL/24 HR (ref 58–337)
SPECIMEN VOL 24H UR: 2550 ML/24 HR (ref 800–1800)
SULFATE 24H UR-MCNC: 33 MEQ/24 HR (ref 0–30)
SULFATE UR-MCNC: 13 MEQ/L
TRI-PHOS CRY STONE MICRO: 0.02 RATIO (ref 0–1)
URATE 24H UR-MCNC: 22.2 MG/DL
URATE 24H UR-MRATE: 566 MG/24 HR (ref 250–750)
URATE DIHYD CRY STONE QL IR: 0.61 RATIO (ref 0–1.2)

## 2021-08-30 ENCOUNTER — OFFICE VISIT (OUTPATIENT)
Dept: FAMILY MEDICINE CLINIC | Facility: CLINIC | Age: 41
End: 2021-08-30
Payer: COMMERCIAL

## 2021-08-30 VITALS
HEART RATE: 68 BPM | DIASTOLIC BLOOD PRESSURE: 80 MMHG | BODY MASS INDEX: 30.21 KG/M2 | HEIGHT: 70 IN | SYSTOLIC BLOOD PRESSURE: 110 MMHG | RESPIRATION RATE: 16 BRPM | WEIGHT: 211 LBS | OXYGEN SATURATION: 98 % | TEMPERATURE: 97.5 F

## 2021-08-30 DIAGNOSIS — Z00.00 ANNUAL PHYSICAL EXAM: Primary | ICD-10-CM

## 2021-08-30 DIAGNOSIS — E78.5 HYPERLIPIDEMIA, UNSPECIFIED HYPERLIPIDEMIA TYPE: ICD-10-CM

## 2021-08-30 DIAGNOSIS — Z12.5 SCREENING PSA (PROSTATE SPECIFIC ANTIGEN): ICD-10-CM

## 2021-08-30 PROBLEM — M76.60 ACHILLES TENDINITIS: Status: ACTIVE | Noted: 2021-03-05

## 2021-08-30 PROCEDURE — 3008F BODY MASS INDEX DOCD: CPT | Performed by: FAMILY MEDICINE

## 2021-08-30 PROCEDURE — 99396 PREV VISIT EST AGE 40-64: CPT | Performed by: FAMILY MEDICINE

## 2021-08-30 PROCEDURE — 1036F TOBACCO NON-USER: CPT | Performed by: FAMILY MEDICINE

## 2021-08-30 NOTE — PROGRESS NOTES
Assessment/Plan:    Patient given lab requisition for fasting labs as below  Patient to continue present treatment and follow a low-fat and a low-carbohydrate diet and continue regular exercise 150 minutes per week  Diagnoses and all orders for this visit:    Annual physical exam  -     CBC and Platelet; Future  -     Comprehensive metabolic panel; Future  -     Lipid panel; Future  -     Vitamin D 25 hydroxy; Future  -     UA w Reflex to Microscopic w Reflex to Culture -Lab Collect; Future    Hyperlipidemia, unspecified hyperlipidemia type  -     Lipid panel; Future  -     TSH, 3rd generation with Free T4 reflex; Future    Screening PSA (prostate specific antigen)  -     PSA, Total Screen; Future          Subjective:      Patient ID: Anne White is a 39 y o  male  Patient is here for annual physical exam and he requests lab requisition for fasting labs including PSA testing  Patient is feeling well overall and remains active exercising regularly running 45-50 miles per week  Patient denies family history of colon cancer or prostate cancer  The following portions of the patient's history were reviewed and updated as appropriate: allergies, current medications, past family history, past medical history, past social history, past surgical history and problem list     Review of Systems   Constitutional: Negative for activity change, appetite change, chills, diaphoresis, fatigue, fever and unexpected weight change  HENT: Negative  Eyes: Negative  Respiratory: Negative for cough, chest tightness, shortness of breath and wheezing  Cardiovascular: Negative for chest pain, palpitations and leg swelling  Gastrointestinal: Negative for abdominal pain, blood in stool, constipation, diarrhea, nausea and vomiting  Endocrine: Negative for cold intolerance and heat intolerance  Genitourinary: Negative for difficulty urinating, dysuria, frequency and hematuria     Musculoskeletal: Negative for arthralgias, back pain, gait problem, joint swelling, myalgias, neck pain and neck stiffness  Skin: Negative  Neurological: Negative for dizziness, syncope, weakness, light-headedness and headaches  Hematological: Negative for adenopathy  Does not bruise/bleed easily  Psychiatric/Behavioral: Negative for decreased concentration, dysphoric mood and sleep disturbance  The patient is not nervous/anxious  Objective:      /80   Pulse 68   Temp 97 5 °F (36 4 °C) (Tympanic)   Resp 16   Ht 5' 10" (1 778 m)   Wt 95 7 kg (211 lb)   SpO2 98%   BMI 30 28 kg/m²          Physical Exam  Constitutional:       General: He is not in acute distress  Appearance: Normal appearance  HENT:      Head: Normocephalic  Mouth/Throat:      Mouth: Mucous membranes are moist    Eyes:      General: No scleral icterus  Conjunctiva/sclera: Conjunctivae normal    Cardiovascular:      Rate and Rhythm: Normal rate and regular rhythm  Pulmonary:      Effort: Pulmonary effort is normal       Breath sounds: Normal breath sounds  Abdominal:      Palpations: Abdomen is soft  Tenderness: There is no abdominal tenderness  Musculoskeletal:      Cervical back: Neck supple  Right lower leg: No edema  Left lower leg: No edema  Lymphadenopathy:      Cervical: No cervical adenopathy  Skin:     General: Skin is warm and dry  Neurological:      General: No focal deficit present  Mental Status: He is alert and oriented to person, place, and time  Psychiatric:         Mood and Affect: Mood normal          Behavior: Behavior normal          Thought Content: Thought content normal          Judgment: Judgment normal          BMI Counseling: Body mass index is 30 28 kg/m²   The BMI is above normal  Nutrition recommendations include reducing portion sizes, decreasing overall calorie intake, 3-5 servings of fruits/vegetables daily, reducing fast food intake, consuming healthier snacks, decreasing soda and/or juice intake, moderation in carbohydrate intake, increasing intake of lean protein, reducing intake of saturated fat and trans fat and reducing intake of cholesterol  Exercise recommendations include moderate aerobic physical activity for 150 minutes/week

## 2021-08-31 ENCOUNTER — APPOINTMENT (OUTPATIENT)
Dept: LAB | Facility: MEDICAL CENTER | Age: 41
End: 2021-08-31
Payer: COMMERCIAL

## 2021-08-31 DIAGNOSIS — Z00.00 ANNUAL PHYSICAL EXAM: ICD-10-CM

## 2021-08-31 DIAGNOSIS — E78.5 HYPERLIPIDEMIA, UNSPECIFIED HYPERLIPIDEMIA TYPE: ICD-10-CM

## 2021-08-31 DIAGNOSIS — Z12.5 SCREENING PSA (PROSTATE SPECIFIC ANTIGEN): ICD-10-CM

## 2021-08-31 LAB
25(OH)D3 SERPL-MCNC: 35.9 NG/ML (ref 30–100)
ALBUMIN SERPL BCP-MCNC: 3.7 G/DL (ref 3.5–5)
ALP SERPL-CCNC: 73 U/L (ref 46–116)
ALT SERPL W P-5'-P-CCNC: 40 U/L (ref 12–78)
ANION GAP SERPL CALCULATED.3IONS-SCNC: 3 MMOL/L (ref 4–13)
AST SERPL W P-5'-P-CCNC: 27 U/L (ref 5–45)
BILIRUB SERPL-MCNC: 0.55 MG/DL (ref 0.2–1)
BILIRUB UR QL STRIP: NEGATIVE
BUN SERPL-MCNC: 18 MG/DL (ref 5–25)
CALCIUM SERPL-MCNC: 8.9 MG/DL (ref 8.3–10.1)
CHLORIDE SERPL-SCNC: 106 MMOL/L (ref 100–108)
CHOLEST SERPL-MCNC: 215 MG/DL (ref 50–200)
CLARITY UR: CLEAR
CO2 SERPL-SCNC: 30 MMOL/L (ref 21–32)
COLOR UR: YELLOW
CREAT SERPL-MCNC: 0.99 MG/DL (ref 0.6–1.3)
ERYTHROCYTE [DISTWIDTH] IN BLOOD BY AUTOMATED COUNT: 12.4 % (ref 11.6–15.1)
GFR SERPL CREATININE-BSD FRML MDRD: 94 ML/MIN/1.73SQ M
GLUCOSE P FAST SERPL-MCNC: 93 MG/DL (ref 65–99)
GLUCOSE UR STRIP-MCNC: NEGATIVE MG/DL
HCT VFR BLD AUTO: 47.8 % (ref 36.5–49.3)
HDLC SERPL-MCNC: 52 MG/DL
HGB BLD-MCNC: 15.5 G/DL (ref 12–17)
HGB UR QL STRIP.AUTO: NEGATIVE
KETONES UR STRIP-MCNC: NEGATIVE MG/DL
LDLC SERPL CALC-MCNC: 145 MG/DL (ref 0–100)
LEUKOCYTE ESTERASE UR QL STRIP: NEGATIVE
MCH RBC QN AUTO: 29.9 PG (ref 26.8–34.3)
MCHC RBC AUTO-ENTMCNC: 32.4 G/DL (ref 31.4–37.4)
MCV RBC AUTO: 92 FL (ref 82–98)
NITRITE UR QL STRIP: NEGATIVE
NONHDLC SERPL-MCNC: 163 MG/DL
PH UR STRIP.AUTO: 6 [PH]
PLATELET # BLD AUTO: 260 THOUSANDS/UL (ref 149–390)
PMV BLD AUTO: 11.1 FL (ref 8.9–12.7)
POTASSIUM SERPL-SCNC: 4.7 MMOL/L (ref 3.5–5.3)
PROT SERPL-MCNC: 7.1 G/DL (ref 6.4–8.2)
PROT UR STRIP-MCNC: NEGATIVE MG/DL
PSA SERPL-MCNC: 0.5 NG/ML (ref 0–4)
RBC # BLD AUTO: 5.19 MILLION/UL (ref 3.88–5.62)
SODIUM SERPL-SCNC: 139 MMOL/L (ref 136–145)
SP GR UR STRIP.AUTO: 1.02 (ref 1–1.03)
TRIGL SERPL-MCNC: 89 MG/DL
TSH SERPL DL<=0.05 MIU/L-ACNC: 1.8 UIU/ML (ref 0.36–3.74)
UROBILINOGEN UR QL STRIP.AUTO: 0.2 E.U./DL
WBC # BLD AUTO: 6.08 THOUSAND/UL (ref 4.31–10.16)

## 2021-08-31 PROCEDURE — 82306 VITAMIN D 25 HYDROXY: CPT

## 2021-08-31 PROCEDURE — 36415 COLL VENOUS BLD VENIPUNCTURE: CPT

## 2021-08-31 PROCEDURE — 80061 LIPID PANEL: CPT

## 2021-08-31 PROCEDURE — G0103 PSA SCREENING: HCPCS

## 2021-08-31 PROCEDURE — 84443 ASSAY THYROID STIM HORMONE: CPT

## 2021-08-31 PROCEDURE — 81003 URINALYSIS AUTO W/O SCOPE: CPT

## 2021-08-31 PROCEDURE — 85027 COMPLETE CBC AUTOMATED: CPT

## 2021-08-31 PROCEDURE — 80053 COMPREHEN METABOLIC PANEL: CPT

## 2021-09-03 ENCOUNTER — CLINICAL SUPPORT (OUTPATIENT)
Dept: FAMILY MEDICINE CLINIC | Facility: CLINIC | Age: 41
End: 2021-09-03
Payer: COMMERCIAL

## 2021-09-03 DIAGNOSIS — Z23 NEED FOR INFLUENZA VACCINATION: Primary | ICD-10-CM

## 2021-09-03 PROCEDURE — 90686 IIV4 VACC NO PRSV 0.5 ML IM: CPT

## 2021-09-03 PROCEDURE — 90471 IMMUNIZATION ADMIN: CPT

## 2021-09-17 ENCOUNTER — EVALUATION (OUTPATIENT)
Dept: PHYSICAL THERAPY | Facility: MEDICAL CENTER | Age: 41
End: 2021-09-17
Payer: COMMERCIAL

## 2021-09-17 DIAGNOSIS — M76.62 LEFT ACHILLES TENDINITIS: Primary | ICD-10-CM

## 2021-09-17 DIAGNOSIS — S86.812A STRAIN OF LEFT CALF MUSCLE: ICD-10-CM

## 2021-09-17 PROCEDURE — 97140 MANUAL THERAPY 1/> REGIONS: CPT | Performed by: PHYSICAL THERAPIST

## 2021-09-17 PROCEDURE — 97110 THERAPEUTIC EXERCISES: CPT | Performed by: PHYSICAL THERAPIST

## 2021-09-17 PROCEDURE — 97161 PT EVAL LOW COMPLEX 20 MIN: CPT | Performed by: PHYSICAL THERAPIST

## 2021-09-17 NOTE — LETTER
2021    Andrzej Camargo DO  [facility]    Patient: Alia Cardenas   YOB: 1980   Date of Visit: 2021     Encounter Diagnosis     ICD-10-CM    1  Left Achilles tendinitis  M76 62    2  Strain of left calf muscle  K85 852T        Dear Dr Bhavana Gilbert:    Thank you for your recent referral of Alia Cardenas  Please review the attached evaluation summary from Enrique's recent visit  Please verify that you agree with the plan of care by signing the attached order  If you have any questions or concerns, please do not hesitate to call  I sincerely appreciate the opportunity to share in the care of one of your patients and hope to have another opportunity to work with you in the near future  Sincerely,    Glo Roberts, PT      Referring Provider:      I certify that I have read the below Plan of Care and certify the need for these services furnished under this plan of treatment while under my care  Andrzej Camargo   [facility] [address] [address]  Via Fax: [phone]          PT Evaluation     Today's date: 2021  Patient name: Alia Cardenas  : 1980  MRN: [id number]  Referring provider: Emily White DO  Dx:   Encounter Diagnosis     ICD-10-CM    1  Left Achilles tendinitis  M76 62    2  Strain of left calf muscle  [id number]          Assessment  Assessment details: Pt is a pleasant 38 yo male presenting to physical therapy with a L gastroc/soleus strain  Pt would benefit from skilled PT to address current impairments and return pt to pre-morbid function      Understanding of Dx/Px/POC: good   Prognosis: good    Goals  Impairment Goals  - Pt I with initial HEP in 1-2 visits  - Improve ROM equal to contralateral side in 4-6 weeks  - Increase strength to 5/5 in all affected areas in 4-6 weeks    Functional Goals  - Increase FOTO to at least 81 in 6-8 weeks  - Patient will be independent with comprehensive HEP in 6-8 weeks  - Patient will be able to return to pre-morbid activity level with min difficulty or discomfort in 6-8 weeks    Plan  Patient would benefit from: skilled physical therapy  Other planned modality interventions: Modalities prn   Planned therapy interventions: manual therapy, patient education, strengthening, stretching, therapeutic activities, therapeutic exercise and home exercise program  Frequency: 2x week  Duration in weeks: 6  Treatment plan discussed with: patient        Subjective Evaluation    History of Present Illness  Mechanism of injury: Pt reports that he has had on and off Achilles' tendon pain for the past 6 months  Recently he has had onset of pain in his lower leg in both the area of the soleus and medial gastroc  He has been doing massage, flexibility training and anything else he can think of to treat it  Pt also complains of stiffness in his L anterior ankle  Pain  Current pain ratin  At best pain ratin  At worst pain ratin    Social Support    Employment status: working (Pharm research)  Exercise history: Running, lifting    Patient Goals  Patient goals for therapy: decreased pain and return to sport/leisure activities          Objective     Observations     Additional Observation Details  Gait: Unremarkable except early heel rise L     Functional squat: depth to 70 degrees and slight R wt shift    + TTP and muscle tightness L medial gastroc and soleus    + gastroc and soleus tightness B L worse than R        Neurological Testing     Sensation     Ankle/Foot   Left Ankle/Foot   Intact: light touch    Right Ankle/Foot   Intact: light touch     Active Range of Motion   Left Ankle/Foot   Dorsiflexion (ke): 3 degrees   Plantar flexion: WFL  Inversion: WFL  Eversion: WFL    Right Ankle/Foot   Dorsiflexion (ke): 0 degrees   Plantar flexion: WFL  Inversion: WFL  Eversion: WFL    Joint Play   Left Ankle/Foot  Hypomobile in the talocrural joint       Right Ankle/Foot  Joints within functional limits are the talocrural joint       Strength/Myotome Testing     Left Ankle/Foot   Dorsiflexion: 5  Plantar flexion: 5  Inversion: 4  Eversion: 5    Right Ankle/Foot   Dorsiflexion: 5  Plantar flexion: 5  Inversion: 5  Eversion: 5             Precautions: None      Manuals 9/17            Post talar mob HK            HPL HK            MFR L gastroc and soleus HK                         Ther Ex 9/17            Bike NV            Gastroc str 3x30"            Soleus str 3x30"            Ankle mobility x30            Standing DF x50            T-band IV NV

## 2021-09-17 NOTE — PROGRESS NOTES
PT Evaluation     Today's date: 2021  Patient name: Magui King  : 1980  MRN: [id number]  Referring provider: Olu Mckeon DO  Dx:   Encounter Diagnosis     ICD-10-CM    1  Left Achilles tendinitis  M76 62    2  Strain of left calf muscle  [id number]          Assessment  Assessment details: Pt is a pleasant 38 yo male presenting to physical therapy with a L gastroc/soleus strain  Pt would benefit from skilled PT to address current impairments and return pt to pre-morbid function  Understanding of Dx/Px/POC: good   Prognosis: good    Goals  Impairment Goals  - Pt I with initial HEP in 1-2 visits  - Improve ROM equal to contralateral side in 4-6 weeks  - Increase strength to 5/5 in all affected areas in 4-6 weeks    Functional Goals  - Increase FOTO to at least 81 in 6-8 weeks  - Patient will be independent with comprehensive HEP in 6-8 weeks  - Patient will be able to return to pre-morbid activity level with min difficulty or discomfort in 6-8 weeks    Plan  Patient would benefit from: skilled physical therapy  Other planned modality interventions: Modalities prn   Planned therapy interventions: manual therapy, patient education, strengthening, stretching, therapeutic activities, therapeutic exercise and home exercise program  Frequency: 2x week  Duration in weeks: 6  Treatment plan discussed with: patient        Subjective Evaluation    History of Present Illness  Mechanism of injury: Pt reports that he has had on and off Achilles' tendon pain for the past 6 months  Recently he has had onset of pain in his lower leg in both the area of the soleus and medial gastroc  He has been doing massage, flexibility training and anything else he can think of to treat it  Pt also complains of stiffness in his L anterior ankle     Pain  Current pain ratin  At best pain ratin  At worst pain ratin    Social Support    Employment status: working (Pharm research)  Exercise history: Running, lifting    Patient Goals  Patient goals for therapy: decreased pain and return to sport/leisure activities          Objective     Observations     Additional Observation Details  Gait: Unremarkable except early heel rise L     Functional squat: depth to 70 degrees and slight R wt shift    + TTP and muscle tightness L medial gastroc and soleus    + gastroc and soleus tightness B L worse than R        Neurological Testing     Sensation     Ankle/Foot   Left Ankle/Foot   Intact: light touch    Right Ankle/Foot   Intact: light touch     Active Range of Motion   Left Ankle/Foot   Dorsiflexion (ke): 3 degrees   Plantar flexion: WFL  Inversion: WFL  Eversion: WFL    Right Ankle/Foot   Dorsiflexion (ke): 0 degrees   Plantar flexion: WFL  Inversion: WFL  Eversion: WFL    Joint Play   Left Ankle/Foot  Hypomobile in the talocrural joint  Right Ankle/Foot  Joints within functional limits are the talocrural joint       Strength/Myotome Testing     Left Ankle/Foot   Dorsiflexion: 5  Plantar flexion: 5  Inversion: 4  Eversion: 5    Right Ankle/Foot   Dorsiflexion: 5  Plantar flexion: 5  Inversion: 5  Eversion: 5             Precautions: None      Manuals 9/17            Post talar mob HK            HPL HK            MFR L gastroc and soleus HK                         Ther Ex 9/17            Bike NV            Gastroc str 3x30"            Soleus str 3x30"            Ankle mobility x30            Standing DF x50            T-band IV NV

## 2021-09-20 ENCOUNTER — OFFICE VISIT (OUTPATIENT)
Dept: PHYSICAL THERAPY | Facility: MEDICAL CENTER | Age: 41
End: 2021-09-20
Payer: COMMERCIAL

## 2021-09-20 DIAGNOSIS — S86.812A STRAIN OF LEFT CALF MUSCLE: ICD-10-CM

## 2021-09-20 DIAGNOSIS — M76.62 LEFT ACHILLES TENDINITIS: Primary | ICD-10-CM

## 2021-09-20 PROCEDURE — 97140 MANUAL THERAPY 1/> REGIONS: CPT | Performed by: PHYSICAL THERAPIST

## 2021-09-20 PROCEDURE — 97110 THERAPEUTIC EXERCISES: CPT | Performed by: PHYSICAL THERAPIST

## 2021-09-20 NOTE — PROGRESS NOTES
Daily Note     Today's date: 2021  Patient name: Dianne Tenorio  : 1980  MRN: [id number]  Referring provider: Kassi Stovall DO  Dx:   Encounter Diagnosis     ICD-10-CM    1  Left Achilles tendinitis  M76 62    2  Strain of left calf muscle  S86 344Y                   Subjective: Pt reports that he is having less pain since his last visit  Objective: See treatment diary below      Assessment: Tolerated treatment well  Patient would benefit from continued PT  Pt with less MF tightness upon palpation today  Plan: Continue per plan of care        Precautions: None      Manuals            Post talar mob HK HK           HPL HK HK           MFR L gastroc and soleus HK HK                        Ther Ex            Bike NV 5'           Gastroc str 3x30" 3x30"           Soleus str 3x30" 3x30"           Ankle mobility x30 x30           Standing DF x50 x50           T-band IV NV Blk  3x15

## 2021-09-23 ENCOUNTER — OFFICE VISIT (OUTPATIENT)
Dept: PHYSICAL THERAPY | Facility: MEDICAL CENTER | Age: 41
End: 2021-09-23
Payer: COMMERCIAL

## 2021-09-23 DIAGNOSIS — S86.812A STRAIN OF LEFT CALF MUSCLE: ICD-10-CM

## 2021-09-23 DIAGNOSIS — M76.62 LEFT ACHILLES TENDINITIS: Primary | ICD-10-CM

## 2021-09-23 PROCEDURE — 97110 THERAPEUTIC EXERCISES: CPT | Performed by: PHYSICAL THERAPIST

## 2021-09-23 PROCEDURE — 97140 MANUAL THERAPY 1/> REGIONS: CPT | Performed by: PHYSICAL THERAPIST

## 2021-09-23 NOTE — PROGRESS NOTES
Daily Note     Today's date: 2021  Patient name: Shona Damico  : 1980  MRN: [id number]  Referring provider: Karyna Wagner DO  Dx:   Encounter Diagnosis     ICD-10-CM    1  Left Achilles tendinitis  M76 62    2  Strain of left calf muscle  S86 812A          Subjective: Pt reports that he tired to run  He was only able to run for 3 miles and his calf hurt from the beginning  The pain lingered for a little after the run, but it did not keep him up and he didn't have pain when he woke up  Objective: See treatment diary below      Assessment: Tolerated treatment well  Patient demonstrated fatigue post treatment, exhibited good technique with therapeutic exercises and would benefit from continued PT  Plan: Continue per plan of care        Precautions: None      Manuals           Post talar mob HK HK HK          HPL HK HK HK          MFR L gastroc and soleus HK HK HK          KT taping   HK          Ther Ex           Bike NV 5' 5'          Gastroc str 3x30" 3x30" 3x30"          Soleus str 3x30" 3x30" 3x30"           Ankle mobility x30 x30 x30          Standing DF x50 x50 x50          T-band IV NV Blk  3x15   blk  3x15

## 2021-09-28 ENCOUNTER — OFFICE VISIT (OUTPATIENT)
Dept: PHYSICAL THERAPY | Facility: MEDICAL CENTER | Age: 41
End: 2021-09-28
Payer: COMMERCIAL

## 2021-09-28 DIAGNOSIS — M76.62 LEFT ACHILLES TENDINITIS: Primary | ICD-10-CM

## 2021-09-28 DIAGNOSIS — S86.812A STRAIN OF LEFT CALF MUSCLE: ICD-10-CM

## 2021-09-28 PROCEDURE — 97110 THERAPEUTIC EXERCISES: CPT

## 2021-09-28 PROCEDURE — 97140 MANUAL THERAPY 1/> REGIONS: CPT

## 2021-09-28 NOTE — PROGRESS NOTES
Daily Note     Today's date: 2021  Patient name: Mary Alice Cruz  : 1980  MRN: [id number]  Referring provider: Maribel Dye DO  Dx:   Encounter Diagnosis     ICD-10-CM    1  Left Achilles tendinitis  M76 62    2  Strain of left calf muscle  S86 812A                   Subjective: Pt states that the k-tape applied at  was a big help and has been running 3 miles at a time on a flat surface without any issues  Objective: See treatment diary below      Assessment: Tolerated treatment well  Patient exhibited good technique with therapeutic exercises and would benefit from continued PT  Pt is responding well to current tx plan and modalities as noted  Plan: Continue per plan of care        Precautions: None      Manuals          Post talar mob HK HK HK PROM  KO         HPL HK HK HK KO         MFR L gastroc and soleus HK HK HK KO  IASTM         KT taping   HK KO                                                Ther Ex          Bike NV 5' 5' 5'         Gastroc str 3x30" 3x30" 3x30" 3x30"         Soleus str 3x30" 3x30" 3x30"  3x30"         Ankle mobility x30 x30 x30 x30         Standing DF x50 x50 x50 x50         T-band IV NV Blk  3x15   blk  3x15 Blk  3x15

## 2021-09-30 ENCOUNTER — OFFICE VISIT (OUTPATIENT)
Dept: PHYSICAL THERAPY | Facility: MEDICAL CENTER | Age: 41
End: 2021-09-30
Payer: COMMERCIAL

## 2021-09-30 DIAGNOSIS — S86.812A STRAIN OF LEFT CALF MUSCLE: ICD-10-CM

## 2021-09-30 DIAGNOSIS — M76.62 LEFT ACHILLES TENDINITIS: Primary | ICD-10-CM

## 2021-09-30 PROCEDURE — 97110 THERAPEUTIC EXERCISES: CPT | Performed by: PHYSICAL THERAPIST

## 2021-09-30 PROCEDURE — 97140 MANUAL THERAPY 1/> REGIONS: CPT | Performed by: PHYSICAL THERAPIST

## 2021-09-30 NOTE — PROGRESS NOTES
Daily Note     Today's date: 2021  Patient name: Claudette Andrea  : 1980  MRN: [id number]  Referring provider: Cori Warner DO  Dx:   Encounter Diagnosis     ICD-10-CM    1  Left Achilles tendinitis  M76 62    2  Strain of left calf muscle  S86 185D                   Subjective: Pt reports that he is feeling really good  Objective: See treatment diary below      Assessment: Tolerated treatment well  Patient would benefit from continued PT  Pt is responding very well to tx  Plan: Continue per plan of care        Precautions: None      Manuals         Post talar mob HK HK HK PROM  KO NP        HPL HK HK HK KO HK        MFR L gastroc and soleus HK HK HK KO  IASTM HK        KT taping   HK KO HK                                               Ther Ex         Bike NV 5' 5' 5' 5'        Gastroc str 3x30" 3x30" 3x30" 3x30" 3x30"        Soleus str 3x30" 3x30" 3x30"  3x30" 3x30"        Ankle mobility x30 x30 x30 x30 x30        Standing DF x50 x50 x50 x50 x50        T-band IV NV Blk  3x15   blk  3x15 Blk  3x15 Blk  3x15

## 2021-10-04 ENCOUNTER — OFFICE VISIT (OUTPATIENT)
Dept: PHYSICAL THERAPY | Facility: MEDICAL CENTER | Age: 41
End: 2021-10-04
Payer: COMMERCIAL

## 2021-10-04 DIAGNOSIS — S86.812A STRAIN OF LEFT CALF MUSCLE: ICD-10-CM

## 2021-10-04 DIAGNOSIS — M76.62 LEFT ACHILLES TENDINITIS: Primary | ICD-10-CM

## 2021-10-04 PROCEDURE — 97110 THERAPEUTIC EXERCISES: CPT | Performed by: PHYSICAL THERAPIST

## 2021-10-04 PROCEDURE — 97140 MANUAL THERAPY 1/> REGIONS: CPT | Performed by: PHYSICAL THERAPIST

## 2021-10-07 ENCOUNTER — OFFICE VISIT (OUTPATIENT)
Dept: PHYSICAL THERAPY | Facility: MEDICAL CENTER | Age: 41
End: 2021-10-07
Payer: COMMERCIAL

## 2021-10-07 DIAGNOSIS — S86.812A STRAIN OF LEFT CALF MUSCLE: ICD-10-CM

## 2021-10-07 DIAGNOSIS — M76.62 LEFT ACHILLES TENDINITIS: Primary | ICD-10-CM

## 2021-10-07 PROCEDURE — 97140 MANUAL THERAPY 1/> REGIONS: CPT

## 2021-10-07 PROCEDURE — 97112 NEUROMUSCULAR REEDUCATION: CPT

## 2021-10-07 PROCEDURE — 97110 THERAPEUTIC EXERCISES: CPT

## 2021-10-11 ENCOUNTER — OFFICE VISIT (OUTPATIENT)
Dept: PHYSICAL THERAPY | Facility: MEDICAL CENTER | Age: 41
End: 2021-10-11
Payer: COMMERCIAL

## 2021-10-11 DIAGNOSIS — M76.62 LEFT ACHILLES TENDINITIS: Primary | ICD-10-CM

## 2021-10-11 DIAGNOSIS — S86.812A STRAIN OF LEFT CALF MUSCLE: ICD-10-CM

## 2021-10-11 PROCEDURE — 97112 NEUROMUSCULAR REEDUCATION: CPT

## 2021-10-11 PROCEDURE — 97140 MANUAL THERAPY 1/> REGIONS: CPT

## 2021-10-11 PROCEDURE — 97110 THERAPEUTIC EXERCISES: CPT

## 2021-10-14 ENCOUNTER — OFFICE VISIT (OUTPATIENT)
Dept: PHYSICAL THERAPY | Facility: MEDICAL CENTER | Age: 41
End: 2021-10-14
Payer: COMMERCIAL

## 2021-10-14 DIAGNOSIS — M76.62 LEFT ACHILLES TENDINITIS: Primary | ICD-10-CM

## 2021-10-14 DIAGNOSIS — S86.812A STRAIN OF LEFT CALF MUSCLE: ICD-10-CM

## 2021-10-14 PROCEDURE — 97140 MANUAL THERAPY 1/> REGIONS: CPT | Performed by: PHYSICAL THERAPIST

## 2021-10-14 PROCEDURE — 97112 NEUROMUSCULAR REEDUCATION: CPT | Performed by: PHYSICAL THERAPIST

## 2021-10-14 PROCEDURE — 97110 THERAPEUTIC EXERCISES: CPT | Performed by: PHYSICAL THERAPIST

## 2022-05-27 ENCOUNTER — OFFICE VISIT (OUTPATIENT)
Dept: PHYSICAL THERAPY | Facility: MEDICAL CENTER | Age: 42
End: 2022-05-27
Payer: COMMERCIAL

## 2022-05-27 DIAGNOSIS — M67.88 ACHILLES TENDONOSIS: Primary | ICD-10-CM

## 2022-05-27 PROCEDURE — 97110 THERAPEUTIC EXERCISES: CPT | Performed by: PHYSICAL THERAPIST

## 2022-05-27 PROCEDURE — 97161 PT EVAL LOW COMPLEX 20 MIN: CPT | Performed by: PHYSICAL THERAPIST

## 2022-05-27 PROCEDURE — 97140 MANUAL THERAPY 1/> REGIONS: CPT | Performed by: PHYSICAL THERAPIST

## 2022-05-27 NOTE — PROGRESS NOTES
PT Evaluation     Today's date: 2022  Patient name: Nay Sharma  : 1980  MRN: [id number]  Referring provider: Nik Barnes, PT  Dx:   Encounter Diagnosis   Name Primary?  Achilles tendonosis Yes                  Assessment  Assessment details: Nay Sharma is a 38 y/o male who presents with complaints of left achilles pain  The patient's greatest concern is not being able to run pain free  Primary movement impairment diagnosis of left ankle hypomobility and eccentric strength deficits, resulting in pathoanatomical symptoms of achilles tendonosis, which limits his ability to perform daily activities without pain  Pt  will benefit from skilled PT services that includes manual therapy techniques to enhance tissue extensibility, neuromuscular re-education to facilitate motor control, therapeutic exercise to increase functional mobility, and modalities prn to reduce pain and inflammation    Impairments: abnormal muscle firing, abnormal or restricted ROM, impaired physical strength, lacks appropriate home exercise program and pain with function    Goals  Impairment Goals  - Pt I with initial HEP in 1-2 visits  - Improve ROM equal to contralateral side in 4-6 weeks  - Increase strength to 5/5 in all affected areas in 4-6 weeks    Functional Goals  - Increase Functional Status Measure to: goal status in 6-8 weeks  - Patient will be independent with comprehensive HEP in 6-8 weeks  - Patient will be able to run without pain in 6-8 weeks    Plan  Patient would benefit from: PT eval  Planned modality interventions: thermotherapy: hydrocollator packs and cryotherapy  Other planned modality interventions: EPAT; BFR  Planned therapy interventions: joint mobilization, manual therapy, neuromuscular re-education, patient education, strengthening, stretching, therapeutic exercise, therapeutic activities, home exercise program, graded activity, flexibility, graded exercise, balance/weight bearing training and activity modification  Frequency: 2x week  Duration in weeks: 8  Treatment plan discussed with: patient      Subjective Evaluation    History of Present Illness  Mechanism of injury: Patient states that he has been dealing with left achilles pain for almost a year now  He was also treated for a soleus strain a year ago  He had an MRI approximately 8 months ago  There is no known ZAINA  Patient continues to run and he reports that he feels like he cannot sprint because of decreased push-off and compensation that occurs d/t pain in the left achilles tendon  Shoes that rub against the tendon are also bothersome  He would like to be able to run without pain  Pain  Current pain ratin  At best pain ratin  At worst pain ratin  Quality: sharp (tender; inflamed)    Treatments  Current treatment: physical therapy  Patient Goals  Patient goals for therapy: increased strength, independence with ADLs/IADLs, decreased pain and increased motion        Objective     Observations     Additional Observation Details  Diffuse thickening of left achilles tendon  Palpation   Left   Tenderness of the lateral gastrocnemius, medial gastrocnemius and soleus  Trigger point to soleus  Tenderness   Left Ankle/Foot   No tenderness in the Achilles insertion, anterior talofibular ligament, fifth metatarsal base, lateral malleolus, medial malleolus, navicular and proximal Achilles  Neurological Testing     Sensation     Ankle/Foot   Left Ankle/Foot   Intact: light touch    Right Ankle/Foot   Intact: light touch     Additional Neurological Details  Reflexes NT      Active Range of Motion   Left Ankle/Foot   Dorsiflexion (ke): 0 degrees   Dorsiflexion (kf): WFL  Plantar flexion: WFL  Inversion: WFL  Eversion: WFL    Right Ankle/Foot   Dorsiflexion (ke): 5 degrees   Dorsiflexion (kf): WFL  Plantar flexion: WFL  Inversion: WFL  Eversion: WFL    Passive Range of Motion   Left Hip   Normal passive range of motion    Right Hip Normal passive range of motion  Left Ankle/Foot    Dorsiflexion (ke): 2 degrees     Right Ankle/Foot    Dorsiflexion (ke): 7 degrees     Joint Play   Left Ankle/Foot  Hypomobile in the talocrural joint, subtalar joint and midfoot  Strength/Myotome Testing     Left Hip   Planes of Motion   Flexion: 5  Extension: 4-  Abduction: 4  Adduction: 5    Isolated Muscles   Gluteus kiera: 4-  Gluteus medius: 4  Iliopsoas: 5    Right Hip   Planes of Motion   Flexion: 5  Extension: 5  Abduction: 5  Adduction: 5    Isolated Muscles   Gluteus maximums: 5  Gluteus medius: 5  Iliopsoas: 5    Left Ankle/Foot   Dorsiflexion: 3-  Plantar flexion: 3-  Inversion: 5  Eversion: 5    Right Ankle/Foot   Dorsiflexion: 3-  Plantar flexion: 5  Inversion: 5  Eversion: 5    Tests     Left Hip   Positive long sit  90/90 SLR: Positive  Right Hip   90/90 SLR: Positive  Left Ankle/Foot   Negative for anterior drawer, calcaneal squeeze, posterior drawer and Del Toro  Functional Assessment      Squat    Left within functional limits and right within functional limits  Single Leg Squat   Left Leg  Valgus  Right Leg  Within functional limits  Single Leg Stance   Left single leg stance time: unsteady  Right single leg stance time: [facility]      Posterior weight shift: good    Depth of femur height: above 90 degrees       Precautions:  N/A    Manuals 5/27            STM/IASTM AZ                                                   Neuro Re-Ed                                       Ther Ex             Hamstring mobility  10x            Hamstring mobility c/ pelvic stab 10x            Hamstring/ g-s mobility 90/90 10x                         Heel raises table top 20x            KF heel raises 20x            Ankle DF  20x            Standing ankle inv/ev 20x                         Ther Activity                                       Gait Training                                       Modalities Yisel Taylor, PT  5/30/2022,5:45 PM

## 2022-06-03 ENCOUNTER — OFFICE VISIT (OUTPATIENT)
Dept: PHYSICAL THERAPY | Facility: MEDICAL CENTER | Age: 42
End: 2022-06-03
Payer: COMMERCIAL

## 2022-06-03 DIAGNOSIS — M67.88 ACHILLES TENDONOSIS: Primary | ICD-10-CM

## 2022-06-03 PROCEDURE — 97110 THERAPEUTIC EXERCISES: CPT | Performed by: PHYSICAL THERAPIST

## 2022-06-03 PROCEDURE — 97140 MANUAL THERAPY 1/> REGIONS: CPT | Performed by: PHYSICAL THERAPIST

## 2022-06-03 NOTE — PROGRESS NOTES
Daily Note     Today's date: 6/3/2022  Patient name: Meche Mondragon  : 1980  MRN: [id number]  Referring provider: Tyrone Cheung, PT  Dx:   Encounter Diagnosis     ICD-10-CM    1  Achilles tendonosis  M67 88                   Subjective:  Patient states that he felt great  and Monday  On Monday, he attempted sprinting and his left ankle swelled up c/ pain in his achilles  Objective: See treatment diary below  Assessment:  Patient presents c/ medial achilles tenderness  Severe myofascial restrictions t/o medial g-s complex  STM/IASTM to decrease restrictions  Added BFR to exercises today  Tolerated treatment well  Patient would benefit from continued PT    Plan: Continue per plan of care        Precautions:  N/A    Manuals 5/27 6/3           STM/IASTM AZ AZ                                                  Neuro Re-Ed                                       Ther Ex             Hamstring mobility  10x 10x           Hamstring mobility c/ pelvic stab 10x 10x           Hamstring/ g-s mobility 90/90 10x 10x             BFR           Heel raises table top 20x 30/ 3x15           KF heel raises 20x 30/ 3x15           Ankle DF  20x 30/ 3x15           Standing ankle inv/ev 20x 30/ 3x15                        Ther Activity                                       Gait Training                                       Modalities               10'

## 2022-06-07 ENCOUNTER — OFFICE VISIT (OUTPATIENT)
Dept: PHYSICAL THERAPY | Facility: MEDICAL CENTER | Age: 42
End: 2022-06-07
Payer: COMMERCIAL

## 2022-06-07 DIAGNOSIS — M67.88 ACHILLES TENDONOSIS: Primary | ICD-10-CM

## 2022-06-07 PROCEDURE — 97110 THERAPEUTIC EXERCISES: CPT | Performed by: PHYSICAL THERAPIST

## 2022-06-07 PROCEDURE — 97140 MANUAL THERAPY 1/> REGIONS: CPT | Performed by: PHYSICAL THERAPIST

## 2022-06-08 ENCOUNTER — OFFICE VISIT (OUTPATIENT)
Dept: PHYSICAL THERAPY | Facility: MEDICAL CENTER | Age: 42
End: 2022-06-08
Payer: COMMERCIAL

## 2022-06-08 DIAGNOSIS — M67.88 ACHILLES TENDONOSIS: Primary | ICD-10-CM

## 2022-06-08 PROCEDURE — 97140 MANUAL THERAPY 1/> REGIONS: CPT | Performed by: PHYSICAL THERAPIST

## 2022-06-08 PROCEDURE — 97110 THERAPEUTIC EXERCISES: CPT | Performed by: PHYSICAL THERAPIST

## 2022-06-08 PROCEDURE — 97112 NEUROMUSCULAR REEDUCATION: CPT | Performed by: PHYSICAL THERAPIST

## 2022-06-08 NOTE — PROGRESS NOTES
Daily Note     Today's date: 2022  Patient name: Yvonne Castillo  : 1980  MRN: [id number]  Referring provider: Vaibhav Arce, DONALD  Dx:   Encounter Diagnosis     ICD-10-CM    1  Achilles tendonosis  M67 88                   Subjective:  Patient states that he feels good  Objective: See treatment diary below  Assessment:  Patient presents c/ mild swelling t/o the mid portion of the achilles tendon that came on later today after working out  He felt good after last tx session  Added jump rope intervals to warm-up at the gym  Performed cupping today to decrease myofascial restrictions t/o g-s complex  Added hip strengthening and posterior chain neuromuscular control to promote better push-off and eccentric hamstring strength  Toe touch progression to enhance posterior weight shift  Tolerated treatment well  Patient would benefit from continued PT  Plan: Continue per plan of care        Precautions:  N/A    Manuals 5/27 6/3 6/7 6/8         STM/IASTM AZ AZ AZ          Cupping    AZ                                   Neuro Re-Ed             SL RDL to run hold    2x5 b/l                      Ther Ex             Hamstring mobility  10x 10x 10x 10x         Hamstring mobility c/ pelvic stab 10x 10x 10x 10x         Hamstring/ g-s mobility 90/90 10x 10x 10x            BFR BFR          Heel raises table top 20x 30/ 3x15 30/ 3x15          KF heel raises 20x 30/ 3x15 30/ 3x15          Ankle DF  20x 30/ 3x15 30/ 3x15          Standing ankle inv/ev 20x 30/ 3x15 30/ 3x15                       Bridges    20x5s blue         Clams    20x5s blue         Resisted hip flexion plank position    10x2s green         PB ecc ham curls    5x SL         Toe touch progression    X                      Gait Training                                       Modalities               10' 10' 10'

## 2022-06-10 ENCOUNTER — APPOINTMENT (OUTPATIENT)
Dept: PHYSICAL THERAPY | Facility: MEDICAL CENTER | Age: 42
End: 2022-06-10
Payer: COMMERCIAL

## 2022-06-15 ENCOUNTER — OFFICE VISIT (OUTPATIENT)
Dept: PHYSICAL THERAPY | Facility: MEDICAL CENTER | Age: 42
End: 2022-06-15
Payer: COMMERCIAL

## 2022-06-15 DIAGNOSIS — M67.88 ACHILLES TENDONOSIS: Primary | ICD-10-CM

## 2022-06-15 PROCEDURE — 97140 MANUAL THERAPY 1/> REGIONS: CPT | Performed by: PHYSICAL THERAPIST

## 2022-06-15 PROCEDURE — 97110 THERAPEUTIC EXERCISES: CPT | Performed by: PHYSICAL THERAPIST

## 2022-06-15 NOTE — PROGRESS NOTES
Daily Note     Today's date: 6/15/2022  Patient name: Lenka Rubalcava  : 1980  MRN: [id number]  Referring provider: Bere Delgado, PT  Dx:   Encounter Diagnosis     ICD-10-CM    1  Achilles tendonosis  M67 88                   Subjective:  Patient states that he is doing well  Objective: See treatment diary below  Assessment:  Patient presents c/ decreased myofascial restrictions t/o medial g-s complex  He demonstrates increased posterior chain neuromuscular control c/ exercises  Tolerated treatment well  Patient would benefit from continued PT  Plan: Continue per plan of care        Precautions:  N/A    Manuals 5/27 6/3 6/7 6/8 6/15        STM/IASTM AZ AZ AZ  AZ        Cupping    AZ                                   Neuro Re-Ed             SL RDL to run hold    2x5 b/l 2x10 matt 10#                     Ther Ex             Hamstring mobility  10x 10x 10x 10x 10x        Hamstring mobility c/ pelvic stab 10x 10x 10x 10x 10x        Hamstring/ g-s mobility 90/90 10x 10x 10x            BFR BFR  BFR        Heel raises table top 20x 30/ 3x15 30/ 3x15  30/ 3x15        KF heel raises 20x 30/ 3x15 30/ 3x15  30/ 3x15        Ankle DF  20x 30/ 3x15 30/ 3x15  30/ 3x15        Standing ankle inv/ev 20x 30/ 3x15 30/ 3x15  30/ 3x15                     Bridges    20x5s blue 20x5s blue        Clams    20x5s blue 20x5s blue        Resisted hip flexion plank position    10x2s green         PB ecc ham curls    5x SL 3x3 SL        Toe touch progression    X                      Gait Training                                       Modalities               10' 10' 10' 10'

## 2022-06-17 ENCOUNTER — OFFICE VISIT (OUTPATIENT)
Dept: PHYSICAL THERAPY | Facility: MEDICAL CENTER | Age: 42
End: 2022-06-17
Payer: COMMERCIAL

## 2022-06-17 DIAGNOSIS — M67.88 ACHILLES TENDONOSIS: Primary | ICD-10-CM

## 2022-06-17 PROCEDURE — 97140 MANUAL THERAPY 1/> REGIONS: CPT | Performed by: PHYSICAL THERAPIST

## 2022-06-17 PROCEDURE — 97110 THERAPEUTIC EXERCISES: CPT | Performed by: PHYSICAL THERAPIST

## 2022-06-17 NOTE — PROGRESS NOTES
Daily Note     Today's date: 2022  Patient name: Foster Galindo  : 1980  MRN: [id number]  Referring provider: Kaleb Mercado, PT  Dx:   Encounter Diagnosis     ICD-10-CM    1  Achilles tendonosis  M67 88                   Subjective:  Patient states that he feels pretty good today  Objective: See treatment diary below  Assessment:  Patient demonstrates talocrural hypomobility and did well c/ manuals to enhance DF  Ankle 4 way c/ BFR today and bike for burnout  Tolerated treatment well  Patient would benefit from continued PT  Plan: Continue per plan of care        Precautions:  N/A    Manuals 5/27 6/3 6/7 6/8 6/15 6/17       STM/IASTM AZ AZ AZ  AZ AZ       Cupping    AZ         TC mobs      AZ       1/2 kneel DF MWM      AZ       Neuro Re-Ed             SL RDL to run hold    2x5 b/l 2x10 matt 10#                     Ther Ex             Hamstring mobility  10x 10x 10x 10x 10x        Hamstring mobility c/ pelvic stab 10x 10x 10x 10x 10x        Hamstring/ g-s mobility 90/90 10x 10x 10x            BFR BFR  BFR BFR       Ankle 4 way      30/ 3x15 blue       Bike      5'       Heel raises table top 20x 30/ 3x15 30/ 3x15  30/ 3x15        KF heel raises 20x 30/ 3x15 30/ 3x15  30/ 3x15        Ankle DF  20x 30/ 3x15 30/ 3x15  30/ 3x15        Standing ankle inv/ev 20x 30/ 3x15 30/ 3x15  30/ 3x15                     Bridges    20x5s blue 20x5s blue        Clams    20x5s blue 20x5s blue        Resisted hip flexion plank position    10x2s green         PB ecc ham curls    5x SL 3x3 SL        Toe touch progression    X                      Gait Training                                       Modalities               10' 10' 10' 10' 10'

## 2022-06-22 ENCOUNTER — OFFICE VISIT (OUTPATIENT)
Dept: PHYSICAL THERAPY | Facility: MEDICAL CENTER | Age: 42
End: 2022-06-22
Payer: COMMERCIAL

## 2022-06-22 DIAGNOSIS — M67.88 ACHILLES TENDONOSIS: Primary | ICD-10-CM

## 2022-06-22 PROCEDURE — 97110 THERAPEUTIC EXERCISES: CPT | Performed by: PHYSICAL THERAPIST

## 2022-06-22 PROCEDURE — 97140 MANUAL THERAPY 1/> REGIONS: CPT | Performed by: PHYSICAL THERAPIST

## 2022-06-22 NOTE — PROGRESS NOTES
Daily Note     Today's date: 2022  Patient name: Sav Reyes  : 1980  MRN: [id number]  Referring provider: Jocelyne Conde, PT  Dx:   Encounter Diagnosis     ICD-10-CM    1  Achilles tendonosis  M67 88                   Subjective:  Patient states that he is doing well  Objective: See treatment diary below  Assessment:  Patient presents c/ decreased tenderness t/o medial achilles tendon  He tolerated EPAT well  Added low intensity plyometrics for increased eccentric loading  Tolerated treatment well  Patient would benefit from continued PT  Plan: Continue per plan of care        Precautions:  N/A    Manuals 5/27 6/3 6/7 6/8 6/15 6/17 6/22      STM/IASTM AZ AZ AZ  AZ AZ       Cupping    AZ         TC mobs      AZ AZ      1/2 kneel DF MWM      AZ AZ      Neuro Re-Ed             SL RDL to run hold    2x5 b/l 2x10 matt 10#                     Ther Ex             Hamstring mobility  10x 10x 10x 10x 10x  10x      Hamstring mobility c/ pelvic stab 10x 10x 10x 10x 10x  10x      Hamstring/ g-s mobility 90/90 10x 10x 10x    10x        BFR BFR  BFR BFR       Ankle 4 way      30/ 3x15 blue       Bike      5'       Heel raises table top 20x 30/ 3x15 30/ 3x15  30/ 3x15        KF heel raises 20x 30/ 3x15 30/ 3x15  30/ 3x15        Ankle DF  20x 30/ 3x15 30/ 3x15  30/ 3x15        Standing ankle inv/ev 20x 30/ 3x15 30/ 3x15  30/ 3x15                     Bridges    20x5s blue 20x5s blue  30x5s blue      Clams    20x5s blue 20x5s blue  30x5s blue      Resisted hip flexion plank position    10x2s green   10x2s green      PB ecc ham curls    5x SL 3x3 SL  3x5 SL      Toe touch progression    X         Ecc hops in hip ext step       2x10       LP SL hops       3x20 50#      Gait Training                                       Modalities             MH  10' 10' 10' 10' 10'       EPAT (medial gastroc, medial g-s complex, achilles tendon)       AZ

## 2022-06-24 ENCOUNTER — APPOINTMENT (OUTPATIENT)
Dept: PHYSICAL THERAPY | Facility: MEDICAL CENTER | Age: 42
End: 2022-06-24
Payer: COMMERCIAL

## 2022-06-29 ENCOUNTER — OFFICE VISIT (OUTPATIENT)
Dept: PHYSICAL THERAPY | Facility: MEDICAL CENTER | Age: 42
End: 2022-06-29
Payer: COMMERCIAL

## 2022-06-29 DIAGNOSIS — M67.88 ACHILLES TENDONOSIS: Primary | ICD-10-CM

## 2022-06-29 PROCEDURE — 97110 THERAPEUTIC EXERCISES: CPT | Performed by: PHYSICAL THERAPIST

## 2022-06-29 PROCEDURE — 97140 MANUAL THERAPY 1/> REGIONS: CPT | Performed by: PHYSICAL THERAPIST

## 2022-06-29 NOTE — PROGRESS NOTES
Daily Note     Today's date: 2022  Patient name: Meche Mondragon  : 1980  MRN: [id number]  Referring provider: Tyrone Cheung, PT  Dx:   Encounter Diagnosis     ICD-10-CM    1  Achilles tendonosis  M67 88                   Subjective:  Patient states that he is doing well  Objective: See treatment diary below  Assessment:  Patient presents c/ decreased achilles pain  He tolerated EPAT well  Patient progressed c/ eccentric and plyometric progressions to enhance g-s strength c/ running  Tolerated treatment well  Patient would benefit from continued PT  Plan: Continue per plan of care        Precautions:  N/A    Manuals 5/27 6/3 6/7 6/8 6/15 6/17 6/22 6/29     STM/IASTM AZ AZ AZ  AZ AZ       Cupping    AZ         TC mobs      AZ AZ AZ     1/2 kneel DF MWM      AZ AZ AZ     Neuro Re-Ed             SL RDL to run hold    2x5 b/l 2x10 matt 10#                     Ther Ex             Hamstring mobility  10x 10x 10x 10x 10x  10x      Hamstring mobility c/ pelvic stab 10x 10x 10x 10x 10x  10x      Hamstring/ g-s mobility 90/90 10x 10x 10x    10x        BFR BFR  BFR BFR       Ankle 4 way      30/ 3x15 blue 30x5s black      Bike      5'       Heel raises table top 20x 30/ 3x15 30/ 3x15  30/ 3x15        KF heel raises 20x 30/ 3x15 30/ 3x15  30/ 3x15        Ankle DF  20x 30/ 3x15 30/ 3x15  30/ 3x15        Standing ankle inv/ev 20x 30/ 3x15 30/ 3x15  30/ 3x15        Ankle DF banded mobility        20x   box     Slant board DF mobility        20x 20# KB     Bridges    20x5s blue 20x5s blue  30x5s blue      Clams    20x5s blue 20x5s blue  30x5s blue      Resisted hip flexion plank position    10x2s green   10x2s green      PB ecc ham curls    5x SL 3x3 SL  3x5 SL      Toe touch progression    X         Ecc hops in hip ext step       2x10       LP SL hops       3x20 50#      Banded jumps        3x15 black     RFE jumps        3x10     G-S machine        3x15 ea     Gait Training Modalities               10' 10' 10' 10' 10'       EPAT (medial gastroc, medial g-s complex, achilles tendon)       [facility]

## 2022-07-01 ENCOUNTER — OFFICE VISIT (OUTPATIENT)
Dept: PHYSICAL THERAPY | Facility: MEDICAL CENTER | Age: 42
End: 2022-07-01
Payer: COMMERCIAL

## 2022-07-01 DIAGNOSIS — M67.88 ACHILLES TENDONOSIS: Primary | ICD-10-CM

## 2022-07-01 PROCEDURE — 97110 THERAPEUTIC EXERCISES: CPT | Performed by: PHYSICAL THERAPIST

## 2022-07-01 PROCEDURE — 97140 MANUAL THERAPY 1/> REGIONS: CPT | Performed by: PHYSICAL THERAPIST

## 2022-07-01 NOTE — PROGRESS NOTES
Daily Note     Today's date: 2022  Patient name: Loreto Ramires  : 1980  MRN: [id number]  Referring provider: Ruslan Zepeda, PT  Dx:   Encounter Diagnosis     ICD-10-CM    1  Achilles tendonosis  M67 88                   Subjective:  Patient states that he feels good  Objective: See treatment diary below  Assessment:  Patient presents c/ decreased tenderness t/o mid portion of left achilles tendon  He also demonstrates increased L ankle mobility and good tolerance to strengthening exercises  Tolerated treatment well  Patient would benefit from continued PT  Plan: Continue per plan of care        Precautions:  N/A    Manuals 5/27 6/3 6/7 6/8 6/15 6/17 6/22 6/29 7/1    STM/IASTM AZ AZ AZ  AZ AZ   AZ    Cupping    AZ         TC mobs      AZ AZ AZ AZ    1/2 kneel DF MWM      AZ AZ AZ AZ    Neuro Re-Ed             SL RDL to run hold    2x5 b/l 2x10 matt 10#                     Ther Ex             Hamstring mobility  10x 10x 10x 10x 10x  10x  10x     Hamstring mobility c/ pelvic stab 10x 10x 10x 10x 10x  10x  10x    Hamstring/ g-s mobility 90/90 10x 10x 10x    10x  10x      BFR BFR  BFR BFR   BFR    Ankle 4 way      30/ 3x15 blue 30x5s black      Bike      5'       Heel raises table top 20x 30/ 3x15 30/ 3x15  30/ 3x15    30/ 3x15    KF heel raises 20x 30/ 3x15 30/ 3x15  30/ 3x15    30/ 3x15    Ankle DF  20x 30/ 3x15 30/ 3x15  30/ 3x15    30/ 3x15    Standing ankle inv/ev 20x 30/ 3x15 30/ 3x15  30/ 3x15    30/ 3x15    Ankle DF banded mobility        20x   box     Slant board DF mobility        20x 20# KB     Bridges    20x5s blue 20x5s blue  30x5s blue  30x5s blue    Clams    20x5s blue 20x5s blue  30x5s blue  30x5s blue    Resisted hip flexion plank position    10x2s green   10x2s green      PB ecc ham curls    5x SL 3x3 SL  3x5 SL  3x5 SL    Toe touch progression    X         Ecc hops in hip ext step       2x10       LP SL hops       3x20 50#      Banded jumps        3x15 black     RFE jumps 3x10     G-S machine        3x15 ea     Gait Training                                       Modalities               10' 10' 10' 10' 10'   10'    EPAT (medial gastroc, medial g-s complex, achilles tendon)       Randy Parham

## 2022-07-17 ENCOUNTER — OFFICE VISIT (OUTPATIENT)
Dept: URGENT CARE | Facility: MEDICAL CENTER | Age: 42
End: 2022-07-17
Payer: COMMERCIAL

## 2022-07-17 VITALS
TEMPERATURE: 96.6 F | HEIGHT: 71 IN | OXYGEN SATURATION: 98 % | BODY MASS INDEX: 29.4 KG/M2 | RESPIRATION RATE: 20 BRPM | DIASTOLIC BLOOD PRESSURE: 94 MMHG | WEIGHT: 210 LBS | HEART RATE: 77 BPM | SYSTOLIC BLOOD PRESSURE: 136 MMHG

## 2022-07-17 DIAGNOSIS — B34.9 VIRAL ILLNESS: Primary | ICD-10-CM

## 2022-07-17 LAB
SARS-COV-2 AG UPPER RESP QL IA: NEGATIVE
VALID CONTROL: NORMAL

## 2022-07-17 PROCEDURE — 87811 SARS-COV-2 COVID19 W/OPTIC: CPT | Performed by: PHYSICIAN ASSISTANT

## 2022-07-17 PROCEDURE — 99213 OFFICE O/P EST LOW 20 MIN: CPT | Performed by: PHYSICIAN ASSISTANT

## 2022-07-17 NOTE — PROGRESS NOTES
[facility]        NAME: John Snell is a 39 y o  male  : 1980    MRN: [id number]  DATE: 2022  TIME: 10:41 AM    Assessment and Plan   Viral illness [B34 9]  1  Viral illness       Patients recently returned from [facility]  2 of patients family members test positive for covid  Patient started with symptoms today  Treat patient as positive  Patient Instructions     Patient Instructions   COVID-19 Home Care Guidelines    Your healthcare provider and/or public health staff have evaluated you and have determined that you do not need to remain in the hospital at this time  At this time you can be isolated at home where you will be monitored by staff from your local or state health department  You should carefully follow the prevention and isolation steps below until a healthcare provider or local or state health department says that you can return to your normal activities  Stay home except to get medical care    People who are mildly ill with COVID-19 are able to isolate at home during their illness  You should restrict activities outside your home, except for getting medical care  Do not go to work, school, or public areas  Avoid using public transportation, ride-sharing, or taxis  Separate yourself from other people and animals in your home    People: As much as possible, you should stay in a specific room and away from other people in your home  Also, you should use a separate bathroom, if available  Animals: You should restrict contact with pets and other animals while you are sick with COVID-19, just like you would around other people  Although there have not been reports of pets or other animals becoming sick with COVID-19, it is still recommended that people sick with COVID-19 limit contact with animals until more information is known about the virus  When possible, have another member of your household care for your animals while you are sick   If you are sick with COVID-19, avoid contact with your pet, including petting, snuggling, being kissed or licked, and sharing food  If you must care for your pet or be around animals while you are sick, wash your hands before and after you interact with pets and wear a facemask  See COVID-19 and Animals for more information  Call ahead before visiting your doctor    If you have a medical appointment, call the healthcare provider and tell them that you have or may have COVID-19  This will help the healthcare providers office take steps to keep other people from getting infected or exposed  Wear a facemask    You should wear a facemask when you are around other people (e g , sharing a room or vehicle) or pets and before you enter a healthcare providers office  If you are not able to wear a facemask (for example, because it causes trouble breathing), then people who live with you should not stay in the same room with you, or they should wear a facemask if they enter your room  Cover your coughs and sneezes    Cover your mouth and nose with a tissue when you cough or sneeze  Throw used tissues in a lined trash can  Immediately wash your hands with soap and water for at least 20 seconds or, if soap and water are not available, clean your hands with an alcohol-based hand  that contains at least 60% alcohol  Clean your hands often    Wash your hands often with soap and water for at least 20 seconds, especially after blowing your nose, coughing, or sneezing; going to the bathroom; and before eating or preparing food  If soap and water are not readily available, use an alcohol-based hand  with at least 60% alcohol, covering all surfaces of your hands and rubbing them together until they feel dry  Soap and water are the best option if hands are visibly dirty  Avoid touching your eyes, nose, and mouth with unwashed hands      Avoid sharing personal household items    You should not share dishes, drinking glasses, cups, eating utensils, towels, or bedding with other people or pets in your home  After using these items, they should be washed thoroughly with soap and water  Clean all high-touch surfaces everyday    High touch surfaces include counters, tabletops, doorknobs, bathroom fixtures, toilets, phones, keyboards, tablets, and bedside tables  Also, clean any surfaces that may have blood, stool, or body fluids on them  Use a household cleaning spray or wipe, according to the label instructions  Labels contain instructions for safe and effective use of the cleaning product including precautions you should take when applying the product, such as wearing gloves and making sure you have good ventilation during use of the product  Monitor your symptoms    Seek prompt medical attention if your illness is worsening (e g , difficulty breathing)  Before seeking care, call your healthcare provider and tell them that you have, or are being evaluated for, COVID-19  Put on a facemask before you enter the facility  These steps will help the healthcare providers office to keep other people in the office or waiting room from getting infected or exposed  Ask your healthcare provider to call the local or [facility] health department  Persons who are placed under active monitoring or facilitated self-monitoring should follow instructions provided by their local health department or occupational health professionals, as appropriate  If you have a medical emergency and need to call 911, notify the dispatch personnel that you have, or are being evaluated for COVID-19  If possible, put on a facemask before emergency medical services arrive      Discontinuing home isolation    Patients with confirmed COVID-19 should remain under home isolation precautions until the following conditions are met:   - They have had no fever for at least 24 hours (that is one full day of no fever without the use medicine that reduces fevers)  AND  - other symptoms have improved (for example, when their cough or shortness of breath have improved)  AND  - If had mild or moderate illness, at least 10 days have passed since their symptoms first appeared or if severe illness (needed oxygen) or immunosuppressed, at least 20 days have passed since symptoms first appeared  Patients with confirmed COVID-19 should also notify close contacts (including their workplace) and ask that they self-quarantine  Currently, close contact is defined as being within 6 feet for 15 minutes or more from the period 24 hours starting 48 hours before symptom onset to the time at which the patient went into isolation  Close contacts of patients diagnosed with COVID-19 should be instructed by the patient to self-quarantine for 14 days from the last time of their last contact with the patient  Source: [employer]         Follow up with PCP in 3-5 days  Proceed to  ER if symptoms worsen  Chief Complaint     Chief Complaint   Patient presents with   Rich Vaughn Like Symptoms     Patient states he woke today with PND, cough, sore throat, body aches He had a negative covid test this AM          History of Present Illness       URI   This is a new problem  The current episode started today  The problem has been unchanged  There has been no fever  Associated symptoms include congestion, rhinorrhea and a sore throat  Pertinent negatives include no abdominal pain, coughing or dysuria  He has tried nothing for the symptoms  Review of Systems   Review of Systems   HENT: Positive for congestion, rhinorrhea and sore throat  Respiratory: Negative for cough  Gastrointestinal: Negative for abdominal pain  Genitourinary: Negative for dysuria           Current Medications       Current Outpatient Medications:     Multiple Vitamin (MULTIVITAMIN) tablet, Take 1 tablet by mouth daily, Disp: , Rfl:     Probiotic Product (SOLUBLE FIBER/PROBIOTICS PO), Take 1 capsule by mouth daily, Disp: , Rfl:     Omega 3 1200 MG CAPS, Take by mouth, Disp: , Rfl:     Current Allergies     Allergies as of 07/17/2022    (No Known Allergies)            The following portions of the patient's history were reviewed and updated as appropriate: allergies, current medications, past family history, past medical history, past social history, past surgical history and problem list      Past Medical History:   Diagnosis Date    Anxiety     h/o    Calculus of ureter     Chronic tension headaches     Kidney stone     passed on own     Kidney stones     Panic attacks     h/o    PONV (postoperative nausea and vomiting)        Past Surgical History:   Procedure Laterality Date    KNEE SURGERY Bilateral     multiple,last assessed 1/2/17    REPAIR KNEE LIGAMENT      SCALP EXCISION N/A 2/23/2018    Procedure: EXCISION SCALP MASS;  Surgeon: Soila Suarez MD;  Location: [facility];  Service: Plastics    WISDOM TOOTH EXTRACTION      one extracted       Family History   Problem Relation Age of Onset    Other Father         arteriosclerosis of autologous arterial coronary artery bypass graft    Nephrolithiasis Father         calcium    Gout Father          Medications have been verified  Objective   /94   Pulse 77   Temp (!) 96 6 °F (35 9 °C)   Resp 20   Ht 5' 11" (1 803 m)   Wt 95 3 kg (210 lb)   SpO2 98%   BMI 29 29 kg/m²   No LMP for male patient         Physical Exam     Physical Exam

## 2022-07-17 NOTE — PATIENT INSTRUCTIONS

## 2022-07-20 ENCOUNTER — TELEMEDICINE (OUTPATIENT)
Dept: FAMILY MEDICINE CLINIC | Facility: CLINIC | Age: 42
End: 2022-07-20
Payer: COMMERCIAL

## 2022-07-20 DIAGNOSIS — U07.1 COVID-19: Primary | ICD-10-CM

## 2022-07-20 PROCEDURE — 99213 OFFICE O/P EST LOW 20 MIN: CPT | Performed by: FAMILY MEDICINE

## 2022-07-20 RX ORDER — BENZONATATE 200 MG/1
200 CAPSULE ORAL 3 TIMES DAILY PRN
Qty: 30 CAPSULE | Refills: 0 | Status: SHIPPED | OUTPATIENT
Start: 2022-07-20

## 2022-07-20 NOTE — PROGRESS NOTES
COVID-19 Outpatient Progress Note    Assessment/Plan:    Problem List Items Addressed This Visit        Other    COVID-19 - Primary     - Patient meets criteria for Paxlovid and agrees to start at this time  Side effects reviewed  He will call with any questions or concerns  Relevant Medications    nirmatrelvir & ritonavir (Paxlovid) tablet therapy pack    benzonatate (TESSALON) 200 MG capsule         Disposition:     Patient has COVID-19 infection  Based off CDC guidelines, they were recommended to isolate for 5 days from the date of the positive test  If they remain asymptomatic, isolation may be ended followed by 5 days of wearing a mask when around othes to minimize risk of infecting others  If they have a fever, continue to stay home until fever resolves for at least 24 hours  Discussed symptom directed medication options with patient  Patient meets criteria for PAXLOVID and they have been counseled appropriately according to EUA documentation released by the FDA  After discussion, patient agrees to treatment  [address] is an investigational medicine used to treat mild-to-moderate COVID-19 in adults and children (15years of age and older weighing at least 80 pounds (40 kg)) with positive results of direct SARS-CoV-2 viral testing, and who are at high risk for progression to severe COVID-19, including hospitalization or death  PAXLOVID is investigational because it is still being studied  There is limited information about the safety and effectiveness of using PAXLOVID to treat people with mild-to-moderate COVID-19  The FDA has authorized the emergency use of PAXLOVID for the treatment of mild-tomoderate COVID-19 in adults and children (15years of age and older weighing at least 80 pounds (40 kg)) with a positive test for the virus that causes COVID-19, and who are at high risk for progression to severe COVID-19, including hospitalization or death, under an EUA       What should I tell my healthcare provider before I take PAXLOVID? Tell your healthcare provider if you:  - Have any allergies  - Have liver or kidney disease  - Are pregnant or plan to become pregnant  - Are breastfeeding a child  - Have any serious illnesses    Tell your healthcare provider about all the medicines you take, including prescription and over-the-counter medicines, vitamins, and herbal supplements  Some medicines may interact with PAXLOVID and may cause serious side effects  Keep a list of your medicines to show your healthcare provider and pharmacist when you get a new medicine  You can ask your healthcare provider or pharmacist for a list of medicines that interact with PAXLOVID  Do not start taking a new medicine without telling your healthcare provider  Your healthcare provider can tell you if it is safe to take PAXLOVID with other medicines  Tell your healthcare provider if you are taking combined hormonal contraceptive  PAXLOVID may affect how your birth control pills work  Females who are able to become pregnant should use another effective alternative form of contraception or an additional barrier method of contraception  Talk to your healthcare provider if you have any questions about contraceptive methods that might be right for you  How do I take PAXLOVID? PAXLOVID consists of 2 medicines: nirmatrelvir and ritonavir  - Take 2 pink tablets of nirmatrelvir with 1 white tablet of ritonavir by mouth 2 times each day (in the morning and in the evening) for 5 days  For each dose, take all 3 tablets at the same time  - If you have kidney disease, talk to your healthcare provider  You may need a different dose  - Swallow the tablets whole  Do not chew, break, or crush the tablets  - Take PAXLOVID with or without food  - Do not stop taking PAXLOVID without talking to your healthcare provider, even if you feel better    - If you miss a dose of PAXLOVID within 8 hours of the time it is usually taken, take it as soon as you remember  If you miss a dose by more than 8 hours, skip the missed dose and take the next dose at your regular time  Do not take 2 doses of PAXLOVID at the same time  - If you take too much PAXLOVID, call your healthcare provider or go to the nearest hospital emergency room right away  - If you are taking a ritonavir- or cobicistat-containing medicine to treat hepatitis C or Human Immunodeficiency Virus (HIV), you should continue to take your medicine as prescribed by your healthcare provider   - Talk to your healthcare provider if you do not feel better or if you feel worse after 5 days  Who should generally not take PAXLOVID? Do not take PAXLOVID if:  You are allergic to nirmatrelvir, ritonavir, or any of the ingredients in PAXLOVID  You are taking any of the following medicines:  - Alfuzosin  - Pethidine, piroxicam, propoxyphene  - Ranolazine  - Amiodarone, dronedarone, flecainide, propafenone, quinidine  - Colchicine  - Lurasidone, pimozide, clozapine  - Dihydroergotamine, ergotamine, methylergonovine  - Lovastatin, simvastatin  - Sildenafil (Revatio®) for pulmonary arterial hypertension (PAH)  - Triazolam, oral midazolam  - Apalutamide  - Carbamazepine, phenobarbital, phenytoin  - Rifampin  - St  Bebos Wort (hypericum perforatum)    What are the important possible side effects of PAXLOVID? Possible side effects of PAXLOVID are:  - Liver Problems  Tell your healthcare provider right away if you have any of these signs and symptoms of liver problems: loss of appetite, yellowing of your skin and the whites of eyes (jaundice), dark-colored urine, pale colored stools and itchy skin, stomach area (abdominal) pain  - Resistance to HIV Medicines  If you have untreated HIV infection, PAXLOVID may lead to some HIV medicines not working as well in the future    - Other possible side effects include: altered sense of taste, diarrhea, high blood pressure, or muscle aches    These are not all the possible side effects of PAXLOVID  Not many people have taken PAXLOVID  Serious and unexpected side effects may happen  Smooth Toure is still being studied, so it is possible that all of the risks are not known at this time  What other treatment choices are there? Like Marcel Pahm may allow for the emergency use of other medicines to treat people with COVID-19  Go to https://e|tab/ for information on the emergency use of other medicines that are authorized by FDA to treat people with COVID-19  Your healthcare provider may talk with you about clinical trials for which you may be eligible  It is your choice to be treated or not to be treated with PAXLOVID  Should you decide not to receive it or for your child not to receive it, it will not change your standard medical care  What if I am pregnant or breastfeeding? There is no experience treating pregnant women or breastfeeding mothers with PAXLOVID  For a mother and unborn baby, the benefit of taking PAXLOVID may be greater than the risk from the treatment  If you are pregnant, discuss your options and specific situation with your healthcare provider  It is recommended that you use effective barrier contraception or do not have sexual activity while taking PAXLOVID  If you are breastfeeding, discuss your options and specific situation with your healthcare provider  How do I report side effects with PAXLOVID? Contact your healthcare provider if you have any side effects that bother you or do not go away  Report side effects to FDA MedWatch at www fda gov/medwatch or call 0-019-XMG2682 or you can report side effects to [facility]  at the contact information provided below  Website Fax number Telephone number   [employer] [phone]     How should I store Smooth Toure?     Store PAXLOVID tablets at room temperature between 68°F to 77°F (20°C to 25°C)  Full fact sheet for patients, parents, and caregivers can be found at: [employer] [facility]    I have spent 30 minutes directly with the patient  Greater than 50% of this time was spent in counseling/coordination of care regarding: risks and benefits of treatment options and instructions for management  Encounter provider Luis Culp MD    Provider located at [address] [place] [address] [phone]    Recent Visits  No visits were found meeting these conditions  Showing recent visits within past 7 days and meeting all other requirements  Today's Visits  Date Type Provider Dept   07/20/22 Telemedicine Luis Culp MD [address] [address] today's visits and meeting all other requirements  Future Appointments  No visits were found meeting these conditions  Showing future appointments within next 150 days and meeting all other requirements     This virtual check-in was done via [employer] and patient was informed that this is a secure, HIPAA-compliant platform  He agrees to proceed  Patient agrees to participate in a virtual check in via telephone or video visit instead of presenting to the office to address urgent/immediate medical needs  Patient is aware this is a billable service  After connecting through [facility], the patient was identified by name and date of birth  Lisbeth Alfredo was informed that this was a telemedicine visit and that the exam was being conducted confidentially over secure lines  My office door was closed  No one else was in the room  Lisbeth Alfredo acknowledged consent and understanding of privacy and security of the telemedicine visit  I informed the patient that I have reviewed his record in Epic and presented the opportunity for him to ask any questions regarding the visit today   The patient agreed to participate  Verification of patient location:  Patient is located in the following state in which I hold an active license: [place]    Subjective:   Zena Rothman is a 39 y o  male who has been screened for COVID-19  Patient's symptoms include fever (Tmax 103), fatigue and cough  Patient denies chills, congestion, rhinorrhea, sore throat, shortness of breath, chest tightness, abdominal pain, nausea, vomiting and diarrhea  - Date of symptom onset: 7/17/2022      COVID-19 vaccination status: Fully vaccinated with booster    Mary Dior has been staying home and has isolated themselves in his home  He is taking care to not share personal items and is cleaning all surfaces that are touched often, like counters, tabletops, and doorknobs using household cleaning sprays or wipes  He is wearing a mask when he leaves his room       Lab Results   Component Value Date    SARSCOV2 Negative 08/06/2021    [address] [place] Negative 07/17/2022     Past Medical History:   Diagnosis Date    Anxiety     h/o    Calculus of ureter     Chronic tension headaches     Kidney stone     passed on own     Kidney stones     Panic attacks     h/o    PONV (postoperative nausea and vomiting)      Past Surgical History:   Procedure Laterality Date    KNEE SURGERY Bilateral     multiple,last assessed 1/2/17    REPAIR KNEE LIGAMENT      SCALP EXCISION N/A 2/23/2018    Procedure: EXCISION SCALP MASS;  Surgeon: Cierra Rodrigez MD;  Location: [facility];  Service: Plastics    WISDOM TOOTH EXTRACTION      one extracted     Current Outpatient Medications   Medication Sig Dispense Refill    benzonatate (TESSALON) 200 MG capsule Take 1 capsule (200 mg total) by mouth 3 (three) times a day as needed for cough 30 capsule 0    nirmatrelvir & ritonavir (Paxlovid) tablet therapy pack Take 3 tablets by mouth 2 (two) times a day for 5 days Take 2 nirmatrelvir tablets + 1 ritonavir tablet together per dose 30 tablet 0    Multiple Vitamin (MULTIVITAMIN) tablet Take 1 tablet by mouth daily      Omega 3 1200 MG CAPS Take by mouth      Probiotic Product (SOLUBLE FIBER/PROBIOTICS PO) Take 1 capsule by mouth daily       No current facility-administered medications for this visit  No Known Allergies    Review of Systems   Constitutional: Positive for fatigue and fever (Tmax 103)  Negative for chills  HENT: Negative for congestion, rhinorrhea and sore throat  Respiratory: Positive for cough  Negative for chest tightness and shortness of breath  Gastrointestinal: Negative for abdominal pain, diarrhea, nausea and vomiting  Objective: There were no vitals filed for this visit  Physical Exam  Constitutional:       General: He is not in acute distress  Appearance: He is not ill-appearing  HENT:      Head: Normocephalic and atraumatic  Eyes:      General:         Right eye: No discharge  Left eye: No discharge  Extraocular Movements: Extraocular movements intact  Pulmonary:      Effort: No respiratory distress  Neurological:      General: No focal deficit present  Mental Status: He is alert  Psychiatric:         Mood and Affect: Mood normal          Behavior: Behavior normal          VIRTUAL VISIT DISCLAIMER    Raghu Molina verbally agrees to participate in [employer]  Pt is aware that [employer] could be limited without vital signs or the ability to perform a full hands-on physical Jonas Piña understands he or the provider may request at any time to terminate the video visit and request the patient to seek care or treatment in person

## 2022-07-20 NOTE — ASSESSMENT & PLAN NOTE
- Patient meets criteria for Paxlovid and agrees to start at this time  Side effects reviewed  He will call with any questions or concerns

## 2023-01-30 ENCOUNTER — OFFICE VISIT (OUTPATIENT)
Dept: FAMILY MEDICINE CLINIC | Facility: CLINIC | Age: 43
End: 2023-01-30

## 2023-01-30 VITALS
HEART RATE: 76 BPM | RESPIRATION RATE: 16 BRPM | WEIGHT: 200.5 LBS | SYSTOLIC BLOOD PRESSURE: 124 MMHG | OXYGEN SATURATION: 98 % | DIASTOLIC BLOOD PRESSURE: 82 MMHG | TEMPERATURE: 97.3 F | HEIGHT: 70 IN | BODY MASS INDEX: 28.71 KG/M2

## 2023-01-30 DIAGNOSIS — Z00.00 ANNUAL PHYSICAL EXAM: Primary | ICD-10-CM

## 2023-01-30 NOTE — PROGRESS NOTES
Name: Jese Bowman      : 1980      MRN: [id number]  Encounter Provider: Bryant Ferguson DO  Encounter Date: 2023   Encounter department: [address] [place] [address]   Patient given lab requisition for fasting labs as below  Recommend low-fat, low-salt and a low carbohydrate diet and continue regular exercise daily including weight training and 150 minutes of cardio a week  Return the office in 1 year  1  Annual physical exam  -     CBC and Platelet; Future  -     Comprehensive metabolic panel; Future  -     Lipid panel; Future  -     TSH, 3rd generation with Free T4 reflex; Future  -     Vitamin D 25 hydroxy; Future  -     UA w Reflex to Microscopic w Reflex to Culture -Lab Collect; Future; Expected date: 2023           Subjective      Patient is a 66-year-old male here for annual physical exam and requests for fasting labs  Patient is feeling well overall and exercises daily both weight training and cardio  Patient denies family history of colon cancer or prostate cancer  Patient is a non-smoker and drinks wine occasionally with meals and 2 cups of coffee daily  Review of Systems   Constitutional: Negative for activity change, appetite change, chills, diaphoresis, fatigue, fever and unexpected weight change  HENT: Negative  Eyes: Negative  Respiratory: Negative for cough, chest tightness, shortness of breath and wheezing  Cardiovascular: Negative for chest pain, palpitations and leg swelling  Gastrointestinal: Negative for abdominal pain, blood in stool, constipation, diarrhea, nausea and vomiting  Endocrine: Negative for cold intolerance and heat intolerance  Genitourinary: Negative for difficulty urinating, dysuria, frequency and hematuria  Musculoskeletal: Negative for arthralgias, back pain, gait problem, joint swelling, myalgias, neck pain and neck stiffness  Skin: Negative      Neurological: Negative for dizziness, syncope, weakness, light-headedness and headaches  Hematological: Negative for adenopathy  Does not bruise/bleed easily  Psychiatric/Behavioral: Negative for decreased concentration, dysphoric mood and sleep disturbance  The patient is not nervous/anxious  Current Outpatient Medications on File Prior to Visit   Medication Sig   • Multiple Vitamin (MULTIVITAMIN) tablet Take 1 tablet by mouth daily   • Probiotic Product (SOLUBLE FIBER/PROBIOTICS PO) Take 1 capsule by mouth daily   • [DISCONTINUED] benzonatate (TESSALON) 200 MG capsule Take 1 capsule (200 mg total) by mouth 3 (three) times a day as needed for cough   • [DISCONTINUED] Omega 3 1200 MG CAPS Take by mouth       Objective     /82   Pulse 76   Temp (!) 97 3 °F (36 3 °C) (Temporal)   Resp 16   Ht 5' 10" (1 778 m)   Wt 90 9 kg (200 lb 8 oz)   SpO2 98%   BMI 28 77 kg/m²     Physical Exam  Constitutional:       General: He is not in acute distress  Appearance: Normal appearance  HENT:      Head: Normocephalic  Mouth/Throat:      Mouth: Mucous membranes are moist    Eyes:      General: No scleral icterus  Conjunctiva/sclera: Conjunctivae normal    Neck:      Vascular: No carotid bruit  Cardiovascular:      Rate and Rhythm: Normal rate and regular rhythm  Pulmonary:      Effort: Pulmonary effort is normal       Breath sounds: Normal breath sounds  Abdominal:      Palpations: Abdomen is soft  Tenderness: There is no abdominal tenderness  Musculoskeletal:      Cervical back: Neck supple  Right lower leg: No edema  Left lower leg: No edema  Lymphadenopathy:      Cervical: No cervical adenopathy  Skin:     General: Skin is warm and dry  Neurological:      General: No focal deficit present  Mental Status: He is alert and oriented to person, place, and time  Psychiatric:         Mood and Affect: Mood normal          Behavior: Behavior normal          Thought Content:  Thought content normal  Judgment: Judgment normal        Ginna Lane DO  BMI Counseling: Body mass index is 28 77 kg/m²  The BMI is above normal  Nutrition recommendations include decreasing overall calorie intake, 3-5 servings of fruits/vegetables daily, reducing fast food intake, consuming healthier snacks, decreasing soda and/or juice intake, moderation in carbohydrate intake, increasing intake of lean protein, reducing intake of saturated fat and trans fat and reducing intake of cholesterol  Exercise recommendations include moderate aerobic physical activity for 150 minutes/week

## 2023-01-31 ENCOUNTER — APPOINTMENT (OUTPATIENT)
Dept: LAB | Facility: MEDICAL CENTER | Age: 43
End: 2023-01-31

## 2023-01-31 DIAGNOSIS — Z00.00 ANNUAL PHYSICAL EXAM: ICD-10-CM

## 2023-01-31 LAB
25(OH)D3 SERPL-MCNC: 52.4 NG/ML (ref 30–100)
ALBUMIN SERPL BCP-MCNC: 4.2 G/DL (ref 3.5–5)
ALP SERPL-CCNC: 72 U/L (ref 46–116)
ALT SERPL W P-5'-P-CCNC: 45 U/L (ref 12–78)
ANION GAP SERPL CALCULATED.3IONS-SCNC: 6 MMOL/L (ref 4–13)
AST SERPL W P-5'-P-CCNC: 30 U/L (ref 5–45)
BILIRUB SERPL-MCNC: 0.59 MG/DL (ref 0.2–1)
BILIRUB UR QL STRIP: NEGATIVE
BUN SERPL-MCNC: 24 MG/DL (ref 5–25)
CALCIUM SERPL-MCNC: 9.7 MG/DL (ref 8.3–10.1)
CHLORIDE SERPL-SCNC: 104 MMOL/L (ref 96–108)
CHOLEST SERPL-MCNC: 227 MG/DL
CLARITY UR: CLEAR
CO2 SERPL-SCNC: 30 MMOL/L (ref 21–32)
COLOR UR: NORMAL
CREAT SERPL-MCNC: 1.23 MG/DL (ref 0.6–1.3)
ERYTHROCYTE [DISTWIDTH] IN BLOOD BY AUTOMATED COUNT: 12.2 % (ref 11.6–15.1)
GFR SERPL CREATININE-BSD FRML MDRD: 71 ML/MIN/1.73SQ M
GLUCOSE P FAST SERPL-MCNC: 103 MG/DL (ref 65–99)
GLUCOSE UR STRIP-MCNC: NEGATIVE MG/DL
HCT VFR BLD AUTO: 50.9 % (ref 36.5–49.3)
HDLC SERPL-MCNC: 54 MG/DL
HGB BLD-MCNC: 16.6 G/DL (ref 12–17)
HGB UR QL STRIP.AUTO: NEGATIVE
KETONES UR STRIP-MCNC: NEGATIVE MG/DL
LDLC SERPL CALC-MCNC: 147 MG/DL (ref 0–100)
LEUKOCYTE ESTERASE UR QL STRIP: NEGATIVE
MCH RBC QN AUTO: 29.2 PG (ref 26.8–34.3)
MCHC RBC AUTO-ENTMCNC: 32.6 G/DL (ref 31.4–37.4)
MCV RBC AUTO: 90 FL (ref 82–98)
NITRITE UR QL STRIP: NEGATIVE
NONHDLC SERPL-MCNC: 173 MG/DL
PH UR STRIP.AUTO: 6.5 [PH]
PLATELET # BLD AUTO: 274 THOUSANDS/UL (ref 149–390)
PMV BLD AUTO: 11.3 FL (ref 8.9–12.7)
POTASSIUM SERPL-SCNC: 4.8 MMOL/L (ref 3.5–5.3)
PROT SERPL-MCNC: 7.3 G/DL (ref 6.4–8.4)
PROT UR STRIP-MCNC: NEGATIVE MG/DL
RBC # BLD AUTO: 5.69 MILLION/UL (ref 3.88–5.62)
SODIUM SERPL-SCNC: 140 MMOL/L (ref 135–147)
SP GR UR STRIP.AUTO: 1.02 (ref 1–1.03)
TRIGL SERPL-MCNC: 129 MG/DL
TSH SERPL DL<=0.05 MIU/L-ACNC: 2.5 UIU/ML (ref 0.45–4.5)
UROBILINOGEN UR STRIP-ACNC: <2 MG/DL
WBC # BLD AUTO: 6.42 THOUSAND/UL (ref 4.31–10.16)

## 2023-11-16 ENCOUNTER — OFFICE VISIT (OUTPATIENT)
Dept: FAMILY MEDICINE CLINIC | Facility: CLINIC | Age: 43
End: 2023-11-16
Payer: COMMERCIAL

## 2023-11-16 VITALS
OXYGEN SATURATION: 100 % | HEIGHT: 71 IN | RESPIRATION RATE: 16 BRPM | WEIGHT: 215.8 LBS | HEART RATE: 80 BPM | SYSTOLIC BLOOD PRESSURE: 138 MMHG | DIASTOLIC BLOOD PRESSURE: 88 MMHG | BODY MASS INDEX: 30.21 KG/M2 | TEMPERATURE: 96.8 F

## 2023-11-16 DIAGNOSIS — E78.5 HYPERLIPIDEMIA, UNSPECIFIED HYPERLIPIDEMIA TYPE: Primary | ICD-10-CM

## 2023-11-16 DIAGNOSIS — Z82.49 FAMILY HISTORY OF HEART DISEASE: ICD-10-CM

## 2023-11-16 PROCEDURE — 99213 OFFICE O/P EST LOW 20 MIN: CPT | Performed by: FAMILY MEDICINE

## 2023-11-16 RX ORDER — OMEGA-3S/DHA/EPA/FISH OIL/D3 300MG-1000
400 CAPSULE ORAL DAILY
COMMUNITY

## 2023-11-16 NOTE — PROGRESS NOTES
Assessment/Plan:  Patient to schedule CT coronary calcium score. Patient is being referred to Dr. Renetta Pires at [facility]. Patient to continue present treatment. Instructed to follow a low-fat, low-salt and a low carbohydrate diet and get regular aerobic exercise at least under 50 minutes/week. Return to the office as needed. No problem-specific Assessment & Plan notes found for this encounter. Diagnoses and all orders for this visit:    Hyperlipidemia, unspecified hyperlipidemia type  -     CT coronary calcium score; Future  -     Ambulatory Referral to Cardiology; Future    Family history of heart disease  -     CT coronary calcium score; Future  -     Ambulatory Referral to Cardiology; Future    Other orders  -     cholecalciferol (VITAMIN D3) 400 units tablet; Take 400 Units by mouth daily  -     aspirin (ECOTRIN LOW STRENGTH) 81 mg EC tablet; Take 81 mg by mouth daily          Subjective:      Patient ID: Lili Perez is a 37 y.o. male. Patient recently had extensive lab evaluation through [employer] wellness program which were reviewed today in the office. Patient has moderately elevated cholesterol and apo B levels. Patient admits to family history of heart disease. Patient is a non-smoker and exercises regularly. Blood pressure at home consistently in the range of 130s over 80s. Patient requests CT coronary calcium score and referral to lipidologist.        The following portions of the patient's history were reviewed and updated as appropriate: allergies, current medications, past family history, past medical history, past social history, past surgical history, and problem list.    Review of Systems   Constitutional:  Negative for activity change, fatigue and unexpected weight change. Respiratory:  Negative for cough, chest tightness, shortness of breath and wheezing. Cardiovascular:  Negative for chest pain, palpitations and leg swelling.    Neurological:  Negative for dizziness, syncope, weakness, light-headedness and headaches. Psychiatric/Behavioral:  Negative for decreased concentration, dysphoric mood and sleep disturbance. The patient is not nervous/anxious. Objective:      /88   Pulse 80   Temp (!) 96.8 °F (36 °C)   Resp 16   Ht 5' 11" (1.803 m)   Wt 97.9 kg (215 lb 12.8 oz)   SpO2 100%   BMI 30.10 kg/m²          Physical Exam  Constitutional:       General: He is not in acute distress. Appearance: Normal appearance. HENT:      Head: Normocephalic. Mouth/Throat:      Mouth: Mucous membranes are moist.   Eyes:      General: No scleral icterus. Conjunctiva/sclera: Conjunctivae normal.   Neck:      Vascular: No carotid bruit. Cardiovascular:      Rate and Rhythm: Normal rate and regular rhythm. Pulmonary:      Effort: Pulmonary effort is normal.      Breath sounds: Normal breath sounds. Abdominal:      Palpations: Abdomen is soft. Tenderness: There is no abdominal tenderness. Musculoskeletal:      Cervical back: Neck supple. Right lower leg: No edema. Left lower leg: No edema. Lymphadenopathy:      Cervical: No cervical adenopathy. Skin:     General: Skin is warm and dry. Neurological:      General: No focal deficit present. Mental Status: He is alert and oriented to person, place, and time. Psychiatric:         Mood and Affect: Mood normal.         Behavior: Behavior normal.         Thought Content:  Thought content normal.         Judgment: Judgment normal.

## 2023-12-01 ENCOUNTER — HOSPITAL ENCOUNTER (OUTPATIENT)
Dept: CT IMAGING | Facility: HOSPITAL | Age: 43
Discharge: HOME | End: 2023-12-01
Payer: COMMERCIAL

## 2023-12-01 DIAGNOSIS — Z82.49 FAMILY HISTORY OF HEART DISEASE: ICD-10-CM

## 2023-12-01 DIAGNOSIS — E78.5 HYPERLIPIDEMIA, UNSPECIFIED HYPERLIPIDEMIA TYPE: ICD-10-CM

## 2023-12-01 PROCEDURE — G1004 CDSM NDSC: HCPCS

## 2023-12-01 PROCEDURE — 75571 CT HRT W/O DYE W/CA TEST: CPT

## 2023-12-13 ENCOUNTER — OFFICE VISIT (OUTPATIENT)
Dept: CARDIOLOGY CLINIC | Facility: CLINIC | Age: 43
End: 2023-12-13
Payer: COMMERCIAL

## 2023-12-13 VITALS
HEART RATE: 66 BPM | SYSTOLIC BLOOD PRESSURE: 138 MMHG | HEIGHT: 71 IN | BODY MASS INDEX: 29.54 KG/M2 | DIASTOLIC BLOOD PRESSURE: 82 MMHG | WEIGHT: 211 LBS

## 2023-12-13 DIAGNOSIS — E78.5 HYPERLIPIDEMIA, UNSPECIFIED HYPERLIPIDEMIA TYPE: ICD-10-CM

## 2023-12-13 DIAGNOSIS — R93.1 ELEVATED CORONARY ARTERY CALCIUM SCORE: Primary | ICD-10-CM

## 2023-12-13 DIAGNOSIS — Z82.49 FAMILY HISTORY OF HEART DISEASE: ICD-10-CM

## 2023-12-13 PROCEDURE — 99244 OFF/OP CNSLTJ NEW/EST MOD 40: CPT | Performed by: INTERNAL MEDICINE

## 2023-12-13 PROCEDURE — 93000 ELECTROCARDIOGRAM COMPLETE: CPT | Performed by: INTERNAL MEDICINE

## 2023-12-13 RX ORDER — OMEGA-3/DHA/EPA/FISH OIL 300-1000MG
2 CAPSULE ORAL DAILY
COMMUNITY

## 2023-12-13 RX ORDER — ROSUVASTATIN CALCIUM 10 MG/1
10 TABLET, COATED ORAL DAILY
Qty: 90 TABLET | Refills: 3 | Status: SHIPPED | OUTPATIENT
Start: 2023-12-13

## 2023-12-13 NOTE — PATIENT INSTRUCTIONS
CARDIOLOGY ASSESSMENT & PLAN:   Diagnosis ICD-10-CM Associated Orders   1. Elevated coronary artery calcium score  R93.1 Stress test only, exercise     Comprehensive metabolic panel      2. Hyperlipidemia, unspecified hyperlipidemia type  E78.5 Ambulatory Referral to Cardiology     POCT ECG     rosuvastatin (CRESTOR) 10 MG tablet      3. Family history of heart disease  Z82.49 Ambulatory Referral to Cardiology     POCT ECG     rosuvastatin (CRESTOR) 10 MG tablet     Stress test only, exercise        Elevated coronary artery calcium score  Mr. Ruthe Severs is a 77-year-old gentleman who has recently been noted to have elevated coronary artery calcium score during screening for CAD. He has no active cardiac conditions. Though his blood pressure is noted to be elevated he does report that he has whitecoat hypertension and blood pressure is usually normal but is elevated when he goes to physicians visits. His major coronary artery disease risk factors include family history of sudden death and presumed premature CAD in his father who passed away relatively young at age 62 years. He is recently noted to have coronary artery calcium elevated coronary artery calcium Agatston score of 128 putting him in a moderate risk category category. His most recent lipid profile is slightly abnormal with elevated total cholesterol of 229, elevated triglyceride level of 215, elevated non-HDL cholesterol of 179 and elevated calculated LDL of 143. In addition his apolipoprotein B level is noted to be slightly elevated  His physical examination is overall unremarkable except for slightly increased BMI. He has no external stigmata of hypercholesterolemia.   His thyroid function is normal.  Besides his family history of premature ASCVD due to passing away of his dad at age 62 years he has no other major risk enhancing factors including no primary hypercholesterolemia, no metabolic syndrome, no chronic kidney disease, no chronic inflammatory condition, no high risk ethnicity no persistently elevated lipids/biomarkers. Today we reviewed the findings of his various testing including repeated blood works and coronary calcium score testing. I explained to him that coronary artery calcium scoring and testing of apolipoprotein B has limited coping predicting cardiovascular risk in people with overall low ASCVD risk. However given his family history of premature CAD and sudden cardiac death and his athletic lifestyle it would be reasonable to screen him for ASCVD and consider statin therapy until his cholesterol levels are consistently normalized. Primary therapy for him would be lifestyle modifications which he already has adopted. We are doing the following:  -- I am arranging for an exercise treadmill stress test to screen for occlusive CAD and test exercise related blood pressure response. -- I am starting him on rosuvastatin therapy at 10 mg daily. I am requesting a follow-up comprehensive metabolic panel 2 to 3 weeks after initiating the rosuvastatin therapy to assess liver function. He is advised to reach out to us if he develops any unusual muscle aches or fatigue after initiation of this medication. -- I am advising several nonpharmacologic measures to improve lipids. Many of these he is already doing through physical activity and dietary modification along with working with nutritionist.  I am adding some additional tips below which may be helpful to him. -- We will follow-up with him in 2 to 3 months.   We will repeat lipid profile along with direct LDL measurement, measurement of lipoprotein a level, CRP level,  and a repeat comprehensive metabolic panel.  -- Will consider coronary CT angiogram if abnormalities noted during his exercise stress test.  -- I am advising him to continue normal activities including exercise program and reach out to us if he develops any concerning symptoms such as chest pain, shortness of breath, decline in exercise tolerance or presyncope/syncope. NON-PHARMACOLOGIC MEASURES THAT IMPROVE CHOLESTEROL       1. Reduce intake of saturated fats to less than 7% of total calories. Some helpful tips to achieve this include:        -- use egg whites instead of whole eggs. -- use lean turkey-laguna instead of regular laguna        -- use low-fat margarine on your toast  instead of butter. -- use skim milk instead of whole milk. -- use Soy-based products as such as Soy milk, Tofu,    2. Reduced intake of dietary cholesterol to less than 200 mg per day. 3. Increase intake of plant sterols and  viscous soluble fiber. -- Plant sterols and stanols (active daily doses, 400-3000 mg; expected LDL-C reduction of 8%-12%). Plant sterols and stanols put in fat medium such as Margarine have been shown to lower LDL cholesterol ('Benecol', 'Smart Balance', Take Control' and similar products). --   Soluble fibers including beta-glucan, psyllium, and glucomannan (active daily doses, 5-15 g; expected LDL-C reduction of 5%-15%)          Soluble viscous fibers such as  Oat ?-glucan (Oat Fiber) and Psyllium husk ( Metamucil etc.) lower cholesterol by reducing absorption of cholesterol bile acids and           delaying digestion of lipids. 4. Increase consumption of nuts, especially as a replacement for candy bars or other high simple-carbohydrate foods (Nuts, especially walnuts and almonds are rich in mono or polyunsaturated fats that lower LDL cholesterol. They also reduced the glycemic load of the diet and may reduce insulin secretion and improve satiety. There also rich in a my no acid arginine, which is the precursor of nitric oxide that promote vasodilation.)    5. Reduce weight through increasing physical activity/exercise and modifying diet.     6. Increase dietary intake of Omega 3 fatty acids ( found predominantly in fatty fish such as [place]/Tuna/Mackeral/Sardines/Herring as well as fish oil supplements). Omega 3 fatty acids reduce plasma concentrations of triglycerides and decreased overall risk for coronary heart disease. 7. Limit alcohol intake and frequency to minimum. 8. Consider taking Nutraceutical supplements that may further lower blood cholesterol:  1. Berberine (active daily doses, 500-1500 mg; expected LDL-C reduction of 18%-75%);   2. Garlic (active daily doses, 5-6 g; expected LDL-C reduction of 5%-10%);  3. Green tea extracts (active daily doses,  g; expected LDL-C reduction of 5%)     HEALTH TIPS FOR PATIENTS WITH ABNORMAL CHOLESTEROL LEVELS INCLUDING ELEVATED TRIGLYCERIDES:    1. Sugar intake should be restricted. Most sugars, especially fructose, stimulatehepatic synthesis of free fatty acids ( FFAs) and thus FFA and triglycerides (TG) accumulation in the liver. Thisaccumulation drives VLDL production and increases plasma TG level in the mild to moderate range (m481-064 mg/dL). Fructose is prevalent in the Western diet,being about half the content of sucrose (white or brown sugar), of high-fructosecorn syrup, and also of honey, and being more than half the content of the sugarin fruit juice and certain other sweeteners, like agave. Thus, restriction of sugar-sweetened beverages, other added sugars, and fruit juice (but not fruit) is usuallyhelpful to reduce TG levels. Nonsugar carbohydrate (starch) has a weak TGlevel-raising effect, mainly if low in fiber, so restricting total carbohydrates mayslightly reduce TG levels. 2. Increase dietary fiber intake-- fibrous food lowers obstruction of bad cholesterol in the blood stream.  Fiber has a modest effect to blunt TG level increase with sugar and other carbohydrates. 3. Fat intake may need to be restricted. Dietary fat drives chylomicron production andrestricting dietary fat is the most effective way to reduce TG levels of more thanabout 800 mg/dL.  Saturated fats are slightly more likely to lead to HTG than unsat-urated fats, but fatty fish has a paradoxic effect of lowering TG levels because ofthe several favorable effects of omega-3 oil on TG metabolism. 4. Physical activity should be increased. Increased exercise tends to reduce TGlevels, likely because of increased muscle TG oxidation as a fuel source, improvedglucose and insulin metabolism (including increased LPL activity/TG lipolysis), anddecreased hepatic TG and FFA content. 5. Weight loss should be encouraged. Whether by decreasing total calories orincreasing activity, negative caloric balance reduces TG levels, likely related toimprovement in insulin resistance and decreased hepatic TG and FFA content. 6. Restriction of alcohol intake may be beneficial. Ethanol increases TG levels in manypatients at any level of intake, and so a trial of decreased consumption is warrantedfor plasma TG levels of more than about 200 mg/dL in patients who consume morethan about 2 servings per week. Tobacco and marijuana have minor TG level-raising effects that are generally not of clinical importance. 7. Home remedies to reduce triglycerides:  Fresh garlic (Eat 2-3 raw garlic close on empty stomach daily), apple cider vinegar,Cayenne pepper, Cinnamon, Coriander seeds, Rhys mushroom, Nuts ( walnuts, almonds, jenny nuts- Nuts are rich in polyunsaturated fatty acids, this component helps to reduce bad cholesterol). HEALTH TIPS FOR PATIENTS WITH ABNORMAL CHOLESTEROL LEVELS INCLUDING ELEVATED TRIGLYCERIDES:    1. Sugar intake should be restricted. Most sugars, especially fructose, stimulatehepatic synthesis of free fatty acids ( FFAs) and thus FFA and triglycerides (TG) accumulation in the liver. Thisaccumulation drives VLDL production and increases plasma TG level in the mild to moderate range ([id number] mg/dL).  Fructose is prevalent in the Western diet,being about half the content of sucrose (white or brown sugar), of high-fructosecorn syrup, and also of honey, and being more than half the content of the sugarin fruit juice and certain other sweeteners, like agave. Thus, restriction of sugar-sweetened beverages, other added sugars, and fruit juice (but not fruit) is usuallyhelpful to reduce TG levels. Nonsugar carbohydrate (starch) has a weak TGlevel-raising effect, mainly if low in fiber, so restricting total carbohydrates mayslightly reduce TG levels. 2. Increase dietary fiber intake-- fibrous food lowers obstruction of bad cholesterol in the blood stream.  Fiber has a modest effect to blunt TG level increase with sugar and other carbohydrates. 3. Fat intake may need to be restricted. Dietary fat drives chylomicron production andrestricting dietary fat is the most effective way to reduce TG levels of more thanabout 800 mg/dL. Saturated fats are slightly more likely to lead to HTG than unsat-urated fats, but fatty fish has a paradoxic effect of lowering TG levels because ofthe several favorable effects of omega-3 oil on TG metabolism. 4. Physical activity should be increased. Increased exercise tends to reduce TGlevels, likely because of increased muscle TG oxidation as a fuel source, improvedglucose and insulin metabolism (including increased LPL activity/TG lipolysis), anddecreased hepatic TG and FFA content. 5. Weight loss should be encouraged. Whether by decreasing total calories orincreasing activity, negative caloric balance reduces TG levels, likely related toimprovement in insulin resistance and decreased hepatic TG and FFA content. 6. Restriction of alcohol intake may be beneficial. Ethanol increases TG levels in manypatients at any level of intake, and so a trial of decreased consumption is warrantedfor plasma TG levels of more than about 200 mg/dL in patients who consume morethan about 2 servings per week. Tobacco and marijuana have minor TG level-raising effects that are generally not of clinical importance.     7. Home remedies to reduce triglycerides:  Fresh garlic (Eat 2-3 raw garlic close on empty stomach daily), apple cider vinegar,Cayenne pepper, Cinnamon, Coriander seeds, Rhys mushroom, Nuts ( walnuts, almonds, jenny nuts- Nuts are rich in polyunsaturated fatty acids, this component helps to reduce bad cholesterol). PATIENT EDUCATION: HIGH TRIGLYCERIDES (THE BASICS)  What are high triglycerides? Triglycerides are fat-like substances in the blood. Everyone has them, but some people have too much of them. This can cause high levels of triglycerides in the blood, also called "high triglycerides."  Compared with people who have normal triglycerides, people with high triglycerides have a higher risk of heart attack, stroke, and other health problems. People with very high triglycerides can get inflammation in the pancreas. The pancreas is an organ that makes hormones and fluids to help the body break down food. When the pancreas gets inflamed, it can cause serious health problems. What should my triglyceride level be? Ask your doctor or nurse what your triglyceride level should be. In general, levels are:  ? Normal - Less than 150 mg/dL (If you live outside the [place], triglycerides are measured differently. The normal level is less than 1.7 mmol/L.)  ? A little bit high - 150 to 499 mg/dL (1.7 to 5.6 mmol/L). ? Moderately high - 500 to 999 mg/dL (5.6 to 11.3 mmol/L). ? Very high - Greater than 1000 mg/dL (>11.3 mmol/L). Am I at higher risk for heart attack or stroke? Yes. Having high triglycerides increases your risk of having a heart attack or stroke. But this is just one of many things that can increase your risk. You are also at higher risk if you:  ?Smoke cigarettes  ? Have high blood pressure  ? Are overweight  ? Have a parent, sister, or brother who got heart disease at a young age. (Blaine Solis, in this case, means younger than 54 for males and younger than 72 for females.)  ? Are male - Females are at risk too, but males have a higher risk. ? Are older  ? Have diabetes - Especially if you cannot control your blood sugar well. Should I take medicine to lower my triglycerides? Not everyone who has high triglycerides needs to take medicine to lower them. Your doctor will decide if you need medicine. It depends on your age, family history, and other health concerns. Medicines can include:  ?Medicines to lower triglyceride levels - These include fenofibrate (sample brand names: Antara, Lopid), fish oil (brand name: Lovaza), or, rarely, nicotinic acid (sample brand names: Niacor, Niaspan). ?Statins - These medicines can reduce the risk of having a heart attack or stroke. They are used to lower cholesterol levels in the body. Many people with high triglycerides also have high cholesterol. The medicine you take will depend on your triglyceride levels and other factors. If your triglycerides are very high, you might need more than 1 medicine. Can I lower my triglycerides without medicines? Yes, you might be able to lower high triglycerides if you:  ? Lose weight (if you are overweight) - Your doctor or nurse can help you do this in a healthy way. ?Get regular exercise  ? Avoid foods and drinks with a lot of sugar and carbohydrates - These include white bread, fruit juice, soda, and sweets. ? Avoid red meat, butter, fried foods, cheese, oils, and nuts - This can help if your triglycerides are over 500 mg/dL. ? Limit alcohol - This generally means no more than 2 drinks a day for males, and no more than 1 drink a day for females. If your triglycerides are over 500 mg/dL, ask your doctor or nurse if it is safe to drink alcohol.

## 2023-12-13 NOTE — ASSESSMENT & PLAN NOTE
Mr. Senia Kennedy is a 55-year-old gentleman who has recently been noted to have elevated coronary artery calcium score during screening for CAD. He has no active cardiac conditions. Though his blood pressure is noted to be elevated he does report that he has whitecoat hypertension and blood pressure is usually normal but is elevated when he goes to physicians visits. His major coronary artery disease risk factors include family history of sudden death and presumed premature CAD in his father who passed away relatively young at age 62 years. He is recently noted to have coronary artery calcium elevated coronary artery calcium Agatston score of 128 putting him in a moderate risk category category. His most recent lipid profile is slightly abnormal with elevated total cholesterol of 229, elevated triglyceride level of 215, elevated non-HDL cholesterol of 179 and elevated calculated LDL of 143. In addition his apolipoprotein B level is noted to be slightly elevated  His physical examination is overall unremarkable except for slightly increased BMI. He has no external stigmata of hypercholesterolemia. His thyroid function is normal.  Besides his family history of premature ASCVD due to passing away of his dad at age 62 years he has no other major risk enhancing factors including no primary hypercholesterolemia, no metabolic syndrome, no chronic kidney disease, no chronic inflammatory condition, no high risk ethnicity no persistently elevated lipids/biomarkers. Today we reviewed the findings of his various testing including repeated blood works and coronary calcium score testing. I explained to him that coronary artery calcium scoring and testing of apolipoprotein B has limited coping predicting cardiovascular risk in people with overall low ASCVD risk.   However given his family history of premature CAD and sudden cardiac death and his athletic lifestyle it would be reasonable to screen him for ASCVD and consider statin therapy until his cholesterol levels are consistently normalized. Primary therapy for him would be lifestyle modifications which he already has adopted. We are doing the following:  -- I am arranging for an exercise treadmill stress test to screen for occlusive CAD and test exercise related blood pressure response. -- I am starting him on rosuvastatin therapy at 10 mg daily. I am requesting a follow-up comprehensive metabolic panel 2 to 3 weeks after initiating the rosuvastatin therapy to assess liver function. He is advised to reach out to us if he develops any unusual muscle aches or fatigue after initiation of this medication. -- I am advising several nonpharmacologic measures to improve lipids. Many of these he is already doing through physical activity and dietary modification along with working with nutritionist.  I am adding some additional tips below which may be helpful to him. -- We will follow-up with him in 2 to 3 months. We will repeat lipid profile along with direct LDL measurement, measurement of lipoprotein a level, CRP level,  and a repeat comprehensive metabolic panel.  -- Will consider coronary CT angiogram if abnormalities noted during his exercise stress test.  -- I am advising him to continue normal activities including exercise program and reach out to us if he develops any concerning symptoms such as chest pain, shortness of breath, decline in exercise tolerance or presyncope/syncope. NON-PHARMACOLOGIC MEASURES THAT IMPROVE CHOLESTEROL       1. Reduce intake of saturated fats to less than 7% of total calories. Some helpful tips to achieve this include:        -- use egg whites instead of whole eggs. -- use lean turkey-laguna instead of regular laguna        -- use low-fat margarine on your toast  instead of butter. -- use skim milk instead of whole milk.         -- use Soy-based products as such as Soy milk, Tofu,    2. Reduced intake of dietary cholesterol to less than 200 mg per day. 3. Increase intake of plant sterols and  viscous soluble fiber. -- Plant sterols and stanols (active daily doses, 400-3000 mg; expected LDL-C reduction of 8%-12%). Plant sterols and stanols put in fat medium such as Margarine have been shown to lower LDL cholesterol ('Benecol', 'Smart Balance', Take Control' and similar products). --   Soluble fibers including beta-glucan, psyllium, and glucomannan (active daily doses, 5-15 g; expected LDL-C reduction of 5%-15%)          Soluble viscous fibers such as  Oat ?-glucan (Oat Fiber) and Psyllium husk ( Metamucil etc.) lower cholesterol by reducing absorption of cholesterol bile acids and           delaying digestion of lipids. 4. Increase consumption of nuts, especially as a replacement for candy bars or other high simple-carbohydrate foods (Nuts, especially walnuts and almonds are rich in mono or polyunsaturated fats that lower LDL cholesterol. They also reduced the glycemic load of the diet and may reduce insulin secretion and improve satiety. There also rich in a my no acid arginine, which is the precursor of nitric oxide that promote vasodilation.)    5. Reduce weight through increasing physical activity/exercise and modifying diet. 6. Increase dietary intake of Omega 3 fatty acids ( found predominantly in fatty fish such as [place]/Tuna/Mackeral/Sardines/Herring as well as fish oil supplements). Omega 3 fatty acids reduce plasma concentrations of triglycerides and decreased overall risk for coronary heart disease. 7. Limit alcohol intake and frequency to minimum. 8. Consider taking Nutraceutical supplements that may further lower blood cholesterol:  1.  Berberine (active daily doses, 500-1500 mg; expected LDL-C reduction of 17%-68%);   2. Garlic (active daily doses, 5-6 g; expected LDL-C reduction of 5%-10%);  3. Green tea extracts (active daily doses,  g; expected LDL-C reduction of 5%)     HEALTH TIPS FOR PATIENTS WITH ABNORMAL CHOLESTEROL LEVELS INCLUDING ELEVATED TRIGLYCERIDES:    1. Sugar intake should be restricted. Most sugars, especially fructose, stimulatehepatic synthesis of free fatty acids ( FFAs) and thus FFA and triglycerides (TG) accumulation in the liver. Thisaccumulation drives VLDL production and increases plasma TG level in the mild to moderate range (u922-492 mg/dL). Fructose is prevalent in the Western diet,being about half the content of sucrose (white or brown sugar), of high-fructosecorn syrup, and also of honey, and being more than half the content of the sugarin fruit juice and certain other sweeteners, like agave. Thus, restriction of sugar-sweetened beverages, other added sugars, and fruit juice (but not fruit) is usuallyhelpful to reduce TG levels. Nonsugar carbohydrate (starch) has a weak TGlevel-raising effect, mainly if low in fiber, so restricting total carbohydrates mayslightly reduce TG levels. 2. Increase dietary fiber intake-- fibrous food lowers obstruction of bad cholesterol in the blood stream.  Fiber has a modest effect to blunt TG level increase with sugar and other carbohydrates. 3. Fat intake may need to be restricted. Dietary fat drives chylomicron production andrestricting dietary fat is the most effective way to reduce TG levels of more thanabout 800 mg/dL. Saturated fats are slightly more likely to lead to HTG than unsat-urated fats, but fatty fish has a paradoxic effect of lowering TG levels because ofthe several favorable effects of omega-3 oil on TG metabolism. 4. Physical activity should be increased. Increased exercise tends to reduce TGlevels, likely because of increased muscle TG oxidation as a fuel source, improvedglucose and insulin metabolism (including increased LPL activity/TG lipolysis), anddecreased hepatic TG and FFA content. 5. Weight loss should be encouraged.  Whether by decreasing total calories orincreasing activity, negative caloric balance reduces TG levels, likely related toimprovement in insulin resistance and decreased hepatic TG and FFA content. 6. Restriction of alcohol intake may be beneficial. Ethanol increases TG levels in manypatients at any level of intake, and so a trial of decreased consumption is warrantedfor plasma TG levels of more than about 200 mg/dL in patients who consume morethan about 2 servings per week. Tobacco and marijuana have minor TG level-raising effects that are generally not of clinical importance. 7. Home remedies to reduce triglycerides:  Fresh garlic (Eat 2-3 raw garlic close on empty stomach daily), apple cider vinegar,Cayenne pepper, Cinnamon, Coriander seeds, Rhys mushroom, Nuts ( walnuts, almonds, jenny nuts- Nuts are rich in polyunsaturated fatty acids, this component helps to reduce bad cholesterol). HEALTH TIPS FOR PATIENTS WITH ABNORMAL CHOLESTEROL LEVELS INCLUDING ELEVATED TRIGLYCERIDES:    1. Sugar intake should be restricted. Most sugars, especially fructose, stimulatehepatic synthesis of free fatty acids ( FFAs) and thus FFA and triglycerides (TG) accumulation in the liver. Thisaccumulation drives VLDL production and increases plasma TG level in the mild to moderate range ([id number] mg/dL). Fructose is prevalent in the Western diet,being about half the content of sucrose (white or brown sugar), of high-fructosecorn syrup, and also of honey, and being more than half the content of the sugarin fruit juice and certain other sweeteners, like agave. Thus, restriction of sugar-sweetened beverages, other added sugars, and fruit juice (but not fruit) is usuallyhelpful to reduce TG levels. Nonsugar carbohydrate (starch) has a weak TGlevel-raising effect, mainly if low in fiber, so restricting total carbohydrates mayslightly reduce TG levels.     2. Increase dietary fiber intake-- fibrous food lowers obstruction of bad cholesterol in the blood stream.  Fiber has a modest effect to blunt TG level increase with sugar and other carbohydrates. 3. Fat intake may need to be restricted. Dietary fat drives chylomicron production andrestricting dietary fat is the most effective way to reduce TG levels of more thanabout 800 mg/dL. Saturated fats are slightly more likely to lead to HTG than unsat-urated fats, but fatty fish has a paradoxic effect of lowering TG levels because ofthe several favorable effects of omega-3 oil on TG metabolism. 4. Physical activity should be increased. Increased exercise tends to reduce TGlevels, likely because of increased muscle TG oxidation as a fuel source, improvedglucose and insulin metabolism (including increased LPL activity/TG lipolysis), anddecreased hepatic TG and FFA content. 5. Weight loss should be encouraged. Whether by decreasing total calories orincreasing activity, negative caloric balance reduces TG levels, likely related toimprovement in insulin resistance and decreased hepatic TG and FFA content. 6. Restriction of alcohol intake may be beneficial. Ethanol increases TG levels in manypatients at any level of intake, and so a trial of decreased consumption is warrantedfor plasma TG levels of more than about 200 mg/dL in patients who consume morethan about 2 servings per week. Tobacco and marijuana have minor TG level-raising effects that are generally not of clinical importance. 7. Home remedies to reduce triglycerides:  Fresh garlic (Eat 2-3 raw garlic close on empty stomach daily), apple cider vinegar,Cayenne pepper, Cinnamon, Coriander seeds, Rhys mushroom, Nuts ( walnuts, almonds, jenny nuts- Nuts are rich in polyunsaturated fatty acids, this component helps to reduce bad cholesterol). PATIENT EDUCATION: HIGH TRIGLYCERIDES (THE BASICS)  What are high triglycerides? Triglycerides are fat-like substances in the blood. Everyone has them, but some people have too much of them.  This can cause high levels of triglycerides in the blood, also called "high triglycerides."  Compared with people who have normal triglycerides, people with high triglycerides have a higher risk of heart attack, stroke, and other health problems. People with very high triglycerides can get inflammation in the pancreas. The pancreas is an organ that makes hormones and fluids to help the body break down food. When the pancreas gets inflamed, it can cause serious health problems. What should my triglyceride level be? Ask your doctor or nurse what your triglyceride level should be. In general, levels are:  ? Normal - Less than 150 mg/dL (If you live outside the [place], triglycerides are measured differently. The normal level is less than 1.7 mmol/L.)  ? A little bit high - 150 to 499 mg/dL (1.7 to 5.6 mmol/L). ? Moderately high - 500 to 999 mg/dL (5.6 to 11.3 mmol/L). ? Very high - Greater than 1000 mg/dL (>11.3 mmol/L). Am I at higher risk for heart attack or stroke? Yes. Having high triglycerides increases your risk of having a heart attack or stroke. But this is just one of many things that can increase your risk. You are also at higher risk if you:  ?Smoke cigarettes  ? Have high blood pressure  ? Are overweight  ? Have a parent, sister, or brother who got heart disease at a young age. ([place], in this case, means younger than 54 for males and younger than 72 for females.)  ? Are male - Females are at risk too, but males have a higher risk. ? Are older  ? Have diabetes - Especially if you cannot control your blood sugar well. Should I take medicine to lower my triglycerides? Not everyone who has high triglycerides needs to take medicine to lower them. Your doctor will decide if you need medicine. It depends on your age, family history, and other health concerns.   Medicines can include:  ?Medicines to lower triglyceride levels - These include fenofibrate (sample brand names: Antara, Lopid), fish oil (brand name: Lovaza), or, rarely, nicotinic acid (sample brand names: Niacor, Niaspan). ?Statins - These medicines can reduce the risk of having a heart attack or stroke. They are used to lower cholesterol levels in the body. Many people with high triglycerides also have high cholesterol. The medicine you take will depend on your triglyceride levels and other factors. If your triglycerides are very high, you might need more than 1 medicine. Can I lower my triglycerides without medicines? Yes, you might be able to lower high triglycerides if you:  ? Lose weight (if you are overweight) - Your doctor or nurse can help you do this in a healthy way. ?Get regular exercise  ? Avoid foods and drinks with a lot of sugar and carbohydrates - These include white bread, fruit juice, soda, and sweets. ? Avoid red meat, butter, fried foods, cheese, oils, and nuts - This can help if your triglycerides are over 500 mg/dL. ? Limit alcohol - This generally means no more than 2 drinks a day for males, and no more than 1 drink a day for females. If your triglycerides are over 500 mg/dL, ask your doctor or nurse if it is safe to drink alcohol.

## 2023-12-13 NOTE — PROGRESS NOTES
[facility]  [address]  Phone#  [phone]. Fax#  [phone]  *-*-*-*-*-*-*-*-*-*-*-*-*-*-*-*-*-*-*-*-*-*-*-*-*-*-*-*-*-*-*-*-*-*-*-*-*-*-*-*-*-*-*-*-*-*-*-*-*-*-*-*-*-*  ENCOUNTER DATE: 12/13/23 5:57 PM  PATIENT NAME: Graham Coronel   1980    [id number]  Age: 37 y.o. Sex: male  AUTHOR: Kai Noe MD  [facility] PHYSICIAN: Panfilo Muñoz DO  REFERRING PHYSICIAN: Panfilo Muñoz, [address] [employer]  [place],  [address]  Panfilo Muñoz DO   *-*-*-*-*-*-*-*-*-*-*-*-*-*-*-*-*-*-*-*-*-*-*-*-*-*-*-*-*-*-*-*-*-*-*-*-*-*-*-*-*-*-*-*-*-*-*-*-*-*-*-*-*-*-  REASON FOR REFERRAL: Elevated coronary artery calcium score and apolipoprotein B level and family history of premature heart disease    *-*-*-*-*-*-*-*-*-*-*-*-*-*-*-*-*-*-*-*-*-*-*-*-*-*-*-*-*-*-*-*-*-*-*-*-*-*-*-*-*-*-*-*-*-*-*-*-*-*-*-*-*-*-  CARDIOLOGY ASSESSMENT & PLAN:   Diagnosis ICD-10-CM Associated Orders   1. Elevated coronary artery calcium score  R93.1 Stress test only, exercise     Comprehensive metabolic panel      2. Hyperlipidemia, unspecified hyperlipidemia type  E78.5 Ambulatory Referral to Cardiology     POCT ECG     rosuvastatin (CRESTOR) 10 MG tablet      3. Family history of heart disease  Z82.49 Ambulatory Referral to Cardiology     POCT ECG     rosuvastatin (CRESTOR) 10 MG tablet     Stress test only, exercise        Elevated coronary artery calcium score  Mr. Graham Coronel is a 49-year-old gentleman who has recently been noted to have elevated coronary artery calcium score during screening for CAD. He has no active cardiac conditions. Though his blood pressure is noted to be elevated he does report that he has whitecoat hypertension and blood pressure is usually normal but is elevated when he goes to physicians visits.   His major coronary artery disease risk factors include family history of sudden death and presumed premature CAD in his father who passed away relatively young at age 62 years. He is recently noted to have coronary artery calcium elevated coronary artery calcium Agatston score of 128 putting him in a moderate risk category category. His most recent lipid profile is slightly abnormal with elevated total cholesterol of 229, elevated triglyceride level of 215, elevated non-HDL cholesterol of 179 and elevated calculated LDL of 143. In addition his apolipoprotein B level is noted to be slightly elevated  His physical examination is overall unremarkable except for slightly increased BMI. He has no external stigmata of hypercholesterolemia. His thyroid function is normal.  Besides his family history of premature ASCVD due to passing away of his dad at age 62 years he has no other major risk enhancing factors including no primary hypercholesterolemia, no metabolic syndrome, no chronic kidney disease, no chronic inflammatory condition, no high risk ethnicity no persistently elevated lipids/biomarkers. Today we reviewed the findings of his various testing including repeated blood works and coronary calcium score testing. I explained to him that coronary artery calcium scoring and testing of apolipoprotein B has limited coping predicting cardiovascular risk in people with overall low ASCVD risk. However given his family history of premature CAD and sudden cardiac death and his athletic lifestyle it would be reasonable to screen him for ASCVD and consider statin therapy until his cholesterol levels are consistently normalized. Primary therapy for him would be lifestyle modifications which he already has adopted. We are doing the following:  -- I am arranging for an exercise treadmill stress test to screen for occlusive CAD and test exercise related blood pressure response. -- I am starting him on rosuvastatin therapy at 10 mg daily.   I am requesting a follow-up comprehensive metabolic panel 2 to 3 weeks after initiating the rosuvastatin therapy to assess liver function. He is advised to reach out to us if he develops any unusual muscle aches or fatigue after initiation of this medication. -- I am advising several nonpharmacologic measures to improve lipids. Many of these he is already doing through physical activity and dietary modification along with working with nutritionist.  I am adding some additional tips below which may be helpful to him. -- We will follow-up with him in 2 to 3 months. We will repeat lipid profile along with direct LDL measurement, measurement of lipoprotein a level, CRP level,  and a repeat comprehensive metabolic panel.  -- Will consider coronary CT angiogram if abnormalities noted during his exercise stress test.  -- I am advising him to continue normal activities including exercise program and reach out to us if he develops any concerning symptoms such as chest pain, shortness of breath, decline in exercise tolerance or presyncope/syncope. NON-PHARMACOLOGIC MEASURES THAT IMPROVE CHOLESTEROL       1. Reduce intake of saturated fats to less than 7% of total calories. Some helpful tips to achieve this include:        -- use egg whites instead of whole eggs. -- use lean turkey-laguna instead of regular laguna        -- use low-fat margarine on your toast  instead of butter. -- use skim milk instead of whole milk. -- use Soy-based products as such as Soy milk, Tofu,    2. Reduced intake of dietary cholesterol to less than 200 mg per day. 3. Increase intake of plant sterols and  viscous soluble fiber. -- Plant sterols and stanols (active daily doses, 400-3000 mg; expected LDL-C reduction of 8%-12%). Plant sterols and stanols put in fat medium such as Margarine have been shown to lower LDL cholesterol ('Benecol', 'Smart Balance', Take Control' and similar products).       --   Soluble fibers including beta-glucan, psyllium, and glucomannan (active daily doses, 5-15 g; expected LDL-C reduction of 5%-15%) Soluble viscous fibers such as  Oat ?-glucan (Oat Fiber) and Psyllium husk ( Metamucil etc.) lower cholesterol by reducing absorption of cholesterol bile acids and           delaying digestion of lipids. 4. Increase consumption of nuts, especially as a replacement for candy bars or other high simple-carbohydrate foods (Nuts, especially walnuts and almonds are rich in mono or polyunsaturated fats that lower LDL cholesterol. They also reduced the glycemic load of the diet and may reduce insulin secretion and improve satiety. There also rich in a my no acid arginine, which is the precursor of nitric oxide that promote vasodilation.)    5. Reduce weight through increasing physical activity/exercise and modifying diet. 6. Increase dietary intake of Omega 3 fatty acids ( found predominantly in fatty fish such as [place]/Tuna/Mackeral/Sardines/Herring as well as fish oil supplements). Omega 3 fatty acids reduce plasma concentrations of triglycerides and decreased overall risk for coronary heart disease. 7. Limit alcohol intake and frequency to minimum. 8. Consider taking Nutraceutical supplements that may further lower blood cholesterol:  1. Berberine (active daily doses, 500-1500 mg; expected LDL-C reduction of 80%-26%);   2. Garlic (active daily doses, 5-6 g; expected LDL-C reduction of 5%-10%);  3. Green tea extracts (active daily doses,  g; expected LDL-C reduction of 5%)     HEALTH TIPS FOR PATIENTS WITH ABNORMAL CHOLESTEROL LEVELS INCLUDING ELEVATED TRIGLYCERIDES:    1. Sugar intake should be restricted. Most sugars, especially fructose, stimulatehepatic synthesis of free fatty acids ( FFAs) and thus FFA and triglycerides (TG) accumulation in the liver. Thisaccumulation drives VLDL production and increases plasma TG level in the mild to moderate range (d480-383 mg/dL).  Fructose is prevalent in the Western diet,being about half the content of sucrose (white or brown sugar), of high-fructosecorn syrup, and also of honey, and being more than half the content of the sugarin fruit juice and certain other sweeteners, like agave. Thus, restriction of sugar-sweetened beverages, other added sugars, and fruit juice (but not fruit) is usuallyhelpful to reduce TG levels. Nonsugar carbohydrate (starch) has a weak TGlevel-raising effect, mainly if low in fiber, so restricting total carbohydrates mayslightly reduce TG levels. 2. Increase dietary fiber intake-- fibrous food lowers obstruction of bad cholesterol in the blood stream.  Fiber has a modest effect to blunt TG level increase with sugar and other carbohydrates. 3. Fat intake may need to be restricted. Dietary fat drives chylomicron production andrestricting dietary fat is the most effective way to reduce TG levels of more thanabout 800 mg/dL. Saturated fats are slightly more likely to lead to HTG than unsat-urated fats, but fatty fish has a paradoxic effect of lowering TG levels because ofthe several favorable effects of omega-3 oil on TG metabolism. 4. Physical activity should be increased. Increased exercise tends to reduce TGlevels, likely because of increased muscle TG oxidation as a fuel source, improvedglucose and insulin metabolism (including increased LPL activity/TG lipolysis), anddecreased hepatic TG and FFA content. 5. Weight loss should be encouraged. Whether by decreasing total calories orincreasing activity, negative caloric balance reduces TG levels, likely related toimprovement in insulin resistance and decreased hepatic TG and FFA content. 6. Restriction of alcohol intake may be beneficial. Ethanol increases TG levels in manypatients at any level of intake, and so a trial of decreased consumption is warrantedfor plasma TG levels of more than about 200 mg/dL in patients who consume morethan about 2 servings per week. Tobacco and marijuana have minor TG level-raising effects that are generally not of clinical importance.     7. Home remedies to reduce triglycerides:  Fresh garlic (Eat 2-3 raw garlic close on empty stomach daily), apple cider vinegar,Cayenne pepper, Cinnamon, Coriander seeds, Rhys mushroom, Nuts ( walnuts, almonds, jenny nuts- Nuts are rich in polyunsaturated fatty acids, this component helps to reduce bad cholesterol). HEALTH TIPS FOR PATIENTS WITH ABNORMAL CHOLESTEROL LEVELS INCLUDING ELEVATED TRIGLYCERIDES:    1. Sugar intake should be restricted. Most sugars, especially fructose, stimulatehepatic synthesis of free fatty acids ( FFAs) and thus FFA and triglycerides (TG) accumulation in the liver. Thisaccumulation drives VLDL production and increases plasma TG level in the mild to moderate range (d531-910 mg/dL). Fructose is prevalent in the Western diet,being about half the content of sucrose (white or brown sugar), of high-fructosecorn syrup, and also of honey, and being more than half the content of the sugarin fruit juice and certain other sweeteners, like agave. Thus, restriction of sugar-sweetened beverages, other added sugars, and fruit juice (but not fruit) is usuallyhelpful to reduce TG levels. Nonsugar carbohydrate (starch) has a weak TGlevel-raising effect, mainly if low in fiber, so restricting total carbohydrates mayslightly reduce TG levels. 2. Increase dietary fiber intake-- fibrous food lowers obstruction of bad cholesterol in the blood stream.  Fiber has a modest effect to blunt TG level increase with sugar and other carbohydrates. 3. Fat intake may need to be restricted. Dietary fat drives chylomicron production andrestricting dietary fat is the most effective way to reduce TG levels of more thanabout 800 mg/dL. Saturated fats are slightly more likely to lead to HTG than unsat-urated fats, but fatty fish has a paradoxic effect of lowering TG levels because ofthe several favorable effects of omega-3 oil on TG metabolism. 4. Physical activity should be increased.  Increased exercise tends to reduce TGlevels, likely because of increased muscle TG oxidation as a fuel source, improvedglucose and insulin metabolism (including increased LPL activity/TG lipolysis), anddecreased hepatic TG and FFA content. 5. Weight loss should be encouraged. Whether by decreasing total calories orincreasing activity, negative caloric balance reduces TG levels, likely related toimprovement in insulin resistance and decreased hepatic TG and FFA content. 6. Restriction of alcohol intake may be beneficial. Ethanol increases TG levels in manypatients at any level of intake, and so a trial of decreased consumption is warrantedfor plasma TG levels of more than about 200 mg/dL in patients who consume morethan about 2 servings per week. Tobacco and marijuana have minor TG level-raising effects that are generally not of clinical importance. 7. Home remedies to reduce triglycerides:  Fresh garlic (Eat 2-3 raw garlic close on empty stomach daily), apple cider vinegar,Cayenne pepper, Cinnamon, Coriander seeds, Rhys mushroom, Nuts ( walnuts, almonds, jenny nuts- Nuts are rich in polyunsaturated fatty acids, this component helps to reduce bad cholesterol). PATIENT EDUCATION: HIGH TRIGLYCERIDES (THE BASICS)  What are high triglycerides? Triglycerides are fat-like substances in the blood. Everyone has them, but some people have too much of them. This can cause high levels of triglycerides in the blood, also called "high triglycerides."  Compared with people who have normal triglycerides, people with high triglycerides have a higher risk of heart attack, stroke, and other health problems. People with very high triglycerides can get inflammation in the pancreas. The pancreas is an organ that makes hormones and fluids to help the body break down food. When the pancreas gets inflamed, it can cause serious health problems. What should my triglyceride level be? Ask your doctor or nurse what your triglyceride level should be. In general, levels are:  ? Normal - Less than 150 mg/dL (If you live outside the [place], triglycerides are measured differently. The normal level is less than 1.7 mmol/L.)  ? A little bit high - 150 to 499 mg/dL (1.7 to 5.6 mmol/L). ? Moderately high - 500 to 999 mg/dL (5.6 to 11.3 mmol/L). ? Very high - Greater than 1000 mg/dL (>11.3 mmol/L). Am I at higher risk for heart attack or stroke? Yes. Having high triglycerides increases your risk of having a heart attack or stroke. But this is just one of many things that can increase your risk. You are also at higher risk if you:  ?Smoke cigarettes  ? Have high blood pressure  ? Are overweight  ? Have a parent, sister, or brother who got heart disease at a young age. (Ivy Fat, in this case, means younger than 54 for males and younger than 72 for females.)  ? Are male - Females are at risk too, but males have a higher risk. ? Are older  ? Have diabetes - Especially if you cannot control your blood sugar well. Should I take medicine to lower my triglycerides? Not everyone who has high triglycerides needs to take medicine to lower them. Your doctor will decide if you need medicine. It depends on your age, family history, and other health concerns. Medicines can include:  ?Medicines to lower triglyceride levels - These include fenofibrate (sample brand names: Antara, Lopid), fish oil (brand name: Lovaza), or, rarely, nicotinic acid (sample brand names: Niacor, Niaspan). ?Statins - These medicines can reduce the risk of having a heart attack or stroke. They are used to lower cholesterol levels in the body. Many people with high triglycerides also have high cholesterol. The medicine you take will depend on your triglyceride levels and other factors. If your triglycerides are very high, you might need more than 1 medicine. Can I lower my triglycerides without medicines? Yes, you might be able to lower high triglycerides if you:  ? Lose weight (if you are overweight) - Your doctor or nurse can help you do this in a healthy way. ?Get regular exercise  ? Avoid foods and drinks with a lot of sugar and carbohydrates - These include white bread, fruit juice, soda, and sweets. ? Avoid red meat, butter, fried foods, cheese, oils, and nuts - This can help if your triglycerides are over 500 mg/dL. ? Limit alcohol - This generally means no more than 2 drinks a day for males, and no more than 1 drink a day for females. If your triglycerides are over 500 mg/dL, ask your doctor or nurse if it is safe to drink alcohol. *-*-*-*-*-*-*-*-*-*-*-*-*-*-*-*-*-*-*-*-*-*-*-*-*-*-*-*-*-*-*-*-*-*-*-*-*-*-*-*-*-*-*-*-*-*-*-*-*-*-*-*-*-*-  CURRENT ECG:  Results for orders placed or performed in visit on 12/13/23   POCT ECG    Narrative    Sinus rhythm with no significant ST-T wave abnormalities. No evidence of prior infarction, or chamber enlargement or hypertrophy. HR 66 bpm.  Normal axis and intervals. *-*-*-*-*-*-*-*-*-*-*-*-*-*-*-*-*-*-*-*-*-*-*-*-*-*-*-*-*-*-*-*-*-*-*-*-*-*-*-*-*-*-*-*-*-*-*-*-*-*-*-*-*-*-  HISTORY OF PRESENT ILLNESS:  Patient is a pleasant 68-year-old young man with no major prior medical history except for history of renal calculi, history of minuscule Elaine of the knee and sprain of cruciate ligament and history of subcutaneous mass removal from the back. He recently underwent routine physical examination with his primary physician and was noted to have elevated triglyceride levels. In addition he also got apolipoprotein B measurement given his family history and this was elevated at 125. Subsequently he was referred to undergo coronary artery calcium score which was reported as elevated at 128. He has been referred now for further evaluation. He himself does not have prior history of coronary artery disease congestive heart failure arrhythmias hypertension diabetes or other chronic conditions. He does have a strong family history of premature heart disease.   His father passed away prematurely of sudden cardiac that at age 62 years. He is paternal grandmother also had coronary artery disease. His mother has atrial flutter and follows with Dr. Edwina Rm. From a symptom perspective he has not experienced any significant symptoms. He denies any chest pain or unusual shortness of breath or palpitations or dizziness or lightheadedness or passing out or near passing out episodes. He is very active and he exercise and does cardio training and weight training regularly. His exercise tolerance has remained good. He has never been a smoker. He drinks occasionally and usually drinks wine. He mentions that following his most recent blood work he has modified his diet and his activity level and is also working with a nutritionist.    His most recent blood work was performed at [facility] on August 31, 2023  Check your thyroid function                Last thyroid function test was in January 2023 and it was 2.5. Last hemoglobin A1c was 5.4 in August 2023.     The 10-year ASCVD risk score (Clement MENDES, et al., 2019) is: 2.1%    Values used to calculate the score:      Age: 37 years      Sex: Male      Is Non- : No      Diabetic: No      Tobacco smoker: No      Systolic Blood Pressure: 402 mmHg      Is BP treated: No      HDL Cholesterol: 54 mg/dL      Total Cholesterol: 227 mg/dL  (Low risk: Less than <5%; borderline risk: ?5% to <7.5%; intermediate risk: ?7.5% to <20%; high risk:?20%)  Patient's ASCVD risk category: Low    Coronary artery calcium score testing 12/9/2023:  Left main coronary artery:  10  Left anterior descending coronary artery and diagonal branches:  89  Left circumflex coronary artery and left marginal branches:  23  Right coronary artery and right marginal branches:  6  Posterior descending coronary artery: 0  TOTAL coronary calcium score:  128     He has not had any stress testing or other cardiac testing in the past.      *-*-*-*-*-*-*-*-*-*-*-*-*-*-*-*-*-*-*-*-*-*-*-*-*-*-*-*-*-*-*-*-*-*-*-*-*-*-*-*-*-*-*-*-*-*-*-*-*-*-*-*-*-*  PAST MEDICAL HISTORY:  Past Medical History:   Diagnosis Date    Anxiety     h/o    Calculus of ureter     Chronic tension headaches     Kidney stone     passed on own     Kidney stones     Panic attacks     h/o    PONV (postoperative nausea and vomiting)     PAST SURGICAL HISTORY:   Past Surgical History:   Procedure Laterality Date    KNEE SURGERY Bilateral     multiple,last assessed 1/2/17    REPAIR KNEE LIGAMENT      SCALP EXCISION N/A 2/23/2018    Procedure: EXCISION SCALP MASS;  Surgeon: Velia Duque MD;  Location: [facility];  Service: Plastics    WISDOM TOOTH EXTRACTION      one extracted         FAMILY HISTORY:  Family History   Problem Relation Age of Onset    Other Father         arteriosclerosis of autologous arterial coronary artery bypass graft    Nephrolithiasis Father         calcium    Gout Father     SOCIAL HISTORY:  Social History     Tobacco Use   Smoking Status Never   Smokeless Tobacco Never   Tobacco Comments          Social History     Substance and Sexual Activity   Alcohol Use Not Currently    Alcohol/week: 2.0 standard drinks of alcohol    Types: 2 Glasses of wine per week    Comment: social     Social History     Substance and Sexual Activity   Drug Use No    [unfilled]     *-*-*-*-*-*-*-*-*-*-*-*-*-*-*-*-*-*-*-*-*-*-*-*-*-*-*-*-*-*-*-*-*-*-*-*-*-*-*-*-*-*-*-*-*-*-*-*-*-*-*-*-*-*  ALLERGIES:  No Known Allergies CURRENT SCHEDULED MEDICATIONS:    Current Outpatient Medications:     aspirin (ECOTRIN LOW STRENGTH) 81 mg EC tablet, Take 81 mg by mouth daily, Disp: , Rfl:     cholecalciferol (VITAMIN D3) 400 units tablet, Take 400 Units by mouth daily, Disp: , Rfl:     fish oil-omega-3 fatty acids 1000 MG capsule, Take 2 g by mouth daily, Disp: , Rfl:     Multiple Vitamin (MULTIVITAMIN) tablet, Take 1 tablet by mouth daily With Vitamin K2, Disp: , Rfl:     Probiotic Product (SOLUBLE FIBER/PROBIOTICS PO), Take 1 capsule by mouth daily, Disp: , Rfl:     rosuvastatin (CRESTOR) 10 MG tablet, Take 1 tablet (10 mg total) by mouth daily, Disp: 90 tablet, Rfl: 3     *-*-*-*-*-*-*-*-*-*-*-*-*-*-*-*-*-*-*-*-*-*-*-*-*-*-*-*-*-*-*-*-*-*-*-*-*-*-*-*-*-*-*-*-*-*-*-*-*-*-*-*-*-*  REVIEW OF SYMPTOMS:    Positive for: As noted above in HPI  Negative for: All remaining as reviewed below and in HPI.  SYSTEM SYMPTOMS REVIEWED:  General--weight change, fever, night sweats  Respiratoryl-- Wheezing, shortness of breath, cough, URI symptoms, sputum, blood  Cardiovascular--chest pain, syncope, dyspnea on exertion, edema, decline in exercise tolerance, claudication   Gastrointestinal--persistent vomiting, diarrhea, abdominal distention, blood in stool   Muscular or skeletal--joint pain or swelling   Neurologic--headaches, syncope, abnormal movement  Hematologic--history of easy bruising and bleeding   Endocrine--thyroid enlargement, heat or cold intolerance, polyuria   Psychiatric--anxiety, depression      *-*-*-*-*-*-*-*-*-*-*-*-*-*-*-*-*-*-*-*-*-*-*-*-*-*-*-*-*-*-*-*-*-*-*-*-*-*-*-*-*-*-*-*-*-*-*-*-*-*-*-*-*-*  CURRENT OUTPATIENT MEDICATIONS:     Current Outpatient Medications:     aspirin (ECOTRIN LOW STRENGTH) 81 mg EC tablet, Take 81 mg by mouth daily, Disp: , Rfl:     cholecalciferol (VITAMIN D3) 400 units tablet, Take 400 Units by mouth daily, Disp: , Rfl:     fish oil-omega-3 fatty acids 1000 MG capsule, Take 2 g by mouth daily, Disp: , Rfl:     Multiple Vitamin (MULTIVITAMIN) tablet, Take 1 tablet by mouth daily With Vitamin K2, Disp: , Rfl:     Probiotic Product (SOLUBLE FIBER/PROBIOTICS PO), Take 1 capsule by mouth daily, Disp: , Rfl:     rosuvastatin (CRESTOR) 10 MG tablet, Take 1 tablet (10 mg total) by mouth daily, Disp: 90 tablet, Rfl: 3    *-*-*-*-*-*-*-*-*-*-*-*-*-*-*-*-*-*-*-*-*-*-*-*-*-*-*-*-*-*-*-*-*-*-*-*-*-*-*-*-*-*-*-*-*-*-*-*-*-*-*-*-*-*  VITAL SIGNS:  Vitals:    12/13/23 1250   BP: 138/82   BP Location: Right arm   Patient Position: Sitting   Cuff Size: Large   Pulse: 66   Weight: 95.7 kg (211 lb)   Height: 5' 11" (1.803 m)       BMI: Body mass index is 29.43 kg/m². WEIGHTS:   Wt Readings from Last 25 Encounters:   12/13/23 95.7 kg (211 lb)   11/16/23 97.9 kg (215 lb 12.8 oz)   01/30/23 90.9 kg (200 lb 8 oz)   07/17/22 95.3 kg (210 lb)   08/30/21 95.7 kg (211 lb)   08/10/21 94.3 kg (208 lb)   08/06/21 94.3 kg (208 lb)   07/11/21 95.3 kg (210 lb)   09/18/20 97.1 kg (214 lb)   02/21/20 98 kg (216 lb)   09/13/19 98 kg (216 lb)   04/09/19 95.3 kg (210 lb)   12/17/18 96.6 kg (213 lb)   12/15/18 98.4 kg (217 lb)   08/31/18 95.7 kg (211 lb)   02/26/18 97.5 kg (215 lb)   02/23/18 95.3 kg (210 lb)   01/12/18 98.9 kg (218 lb)   11/01/17 94.3 kg (208 lb)   09/18/17 93.9 kg (207 lb)   09/17/17 93.9 kg (207 lb)   02/22/17 99.8 kg (220 lb)   01/03/17 98 kg (216 lb)        *-*-*-*-*-*-*-*-*-*-*-*-*-*-*-*-*-*-*-*-*-*-*-*-*-*-*-*-*-*-*-*-*-*-*-*-*-*-*-*-*-*-*-*-*-*-*-*-*-*-*-*-*-*-  PHYSICAL EXAM:  General Appearance:    Alert, cooperative, no distress, appears stated age, well built   Head, Eyes, ENT:    No obvious abnormality, moist mucous mebranes. Neck:   Supple, no carotid bruit or JVD   Back:     Symmetric, no curvature. Lungs:     Respirations unlabored. Clear to auscultation bilaterally,    Chest wall:    No tenderness or deformity   Heart:    Regular rate and rhythm, S1 and S2 normal, no murmur, rub  or gallop. Abdomen:     Soft, non-tender, No obvious masses, or organomegaly   Extremities:   Extremities warm, no cyanosis or edema    Skin:   No venostatic changes in lower extremities. Normal skin color, texture, and turgor. No rashes or lesions     *-*-*-*-*-*-*-*-*-*-*-*-*-*-*-*-*-*-*-*-*-*-*-*-*-*-*-*-*-*-*-*-*-*-*-*-*-*-*-*-*-*-*-*-*-*-*-*-*-*-*-*-*-*-  LABORATORY DATA: I have personally reviewed the available laboratory data.         Potassium   Date Value Ref Range Status   01/31/2023 4.8 3.5 - 5.3 mmol/L Final   08/31/2021 4.7 3.5 - 5.3 mmol/L Final   08/10/2021 4.6 3.5 - 5.3 mmol/L Final   09/18/2020 5.1 3.5 - 5.3 mmol/L Final     Chloride   Date Value Ref Range Status   01/31/2023 104 96 - 108 mmol/L Final   08/31/2021 106 100 - 108 mmol/L Final   08/10/2021 104 100 - 108 mmol/L Final   09/18/2020 101 98 - 110 mmol/L Final     CO2   Date Value Ref Range Status   01/31/2023 30 21 - 32 mmol/L Final   08/31/2021 30 21 - 32 mmol/L Final   08/10/2021 27 21 - 32 mmol/L Final   09/18/2020 25 20 - 32 mmol/L Final     BUN   Date Value Ref Range Status   01/31/2023 24 5 - 25 mg/dL Final   08/31/2021 18 5 - 25 mg/dL Final   08/10/2021 19 5 - 25 mg/dL Final   09/18/2020 17 7 - 25 mg/dL Final     Creatinine   Date Value Ref Range Status   01/31/2023 1.23 0.60 - 1.30 mg/dL Final     Comment:     Standardized to IDMS reference method   08/31/2021 0.99 0.60 - 1.30 mg/dL Final     Comment:     Standardized to IDMS reference method   08/10/2021 1.00 0.60 - 1.30 mg/dL Final     Comment:     Standardized to IDMS reference method     eGFR   Date Value Ref Range Status   01/31/2023 71 ml/min/1.73sq m Final   08/31/2021 94 ml/min/1.73sq m Final   08/10/2021 94 ml/min/1.73sq m Final     Calcium   Date Value Ref Range Status   01/31/2023 9.7 8.3 - 10.1 mg/dL Final   08/31/2021 8.9 8.3 - 10.1 mg/dL Final   08/10/2021 9.6 8.3 - 10.1 mg/dL Final   09/18/2020 9.9 8.6 - 10.3 mg/dL Final     AST   Date Value Ref Range Status   01/31/2023 30 5 - 45 U/L Final     Comment:     Specimen collection should occur prior to Sulfasalazine administration due to the potential for falsely depressed results. 08/31/2021 27 5 - 45 U/L Final     Comment:     Specimen collection should occur prior to Sulfasalazine administration due to the potential for falsely depressed results.     09/18/2020 29 10 - 40 U/L Final   01/12/2018 29 5 - 45 U/L Final     Comment:       Specimen collection should occur prior to Sulfasalazine administration due to the potential for falsely depressed results. ALT   Date Value Ref Range Status   01/31/2023 45 12 - 78 U/L Final     Comment:     Specimen collection should occur prior to Sulfasalazine and/or Sulfapyridine administration due to the potential for falsely depressed results. 08/31/2021 40 12 - 78 U/L Final     Comment:     Specimen collection should occur prior to Sulfasalazine and/or Sulfapyridine administration due to the potential for falsely depressed results. 09/18/2020 43 9 - 46 U/L Final   01/12/2018 58 12 - 78 U/L Final     Comment:       Specimen collection should occur prior to Sulfasalazine and/or Sulfapyridine administration due to the potential for falsely depressed results.       Alkaline Phosphatase   Date Value Ref Range Status   01/31/2023 72 46 - 116 U/L Final   08/31/2021 73 46 - 116 U/L Final   09/18/2020 68 36 - 130 U/L Final   01/12/2018 79 46 - 116 U/L Final     WBC   Date Value Ref Range Status   01/31/2023 6.42 4.31 - 10.16 Thousand/uL Final   08/31/2021 6.08 4.31 - 10.16 Thousand/uL Final   01/12/2018 6.41 4.31 - 10.16 Thousand/uL Final     White Blood Cell Count   Date Value Ref Range Status   09/18/2020 7.9 3.8 - 10.8 Thousand/uL Final     Hemoglobin   Date Value Ref Range Status   01/31/2023 16.6 12.0 - 17.0 g/dL Final   08/31/2021 15.5 12.0 - 17.0 g/dL Final   09/18/2020 16.7 13.2 - 17.1 g/dL Final   01/12/2018 16.5 12.0 - 17.0 g/dL Final     Platelet Count   Date Value Ref Range Status   09/18/2020 295 140 - 400 Thousand/uL Final     Platelets   Date Value Ref Range Status   01/31/2023 274 149 - 390 Thousands/uL Final   08/31/2021 260 149 - 390 Thousands/uL Final   01/12/2018 295 149 - 390 Thousands/uL Final     No results found for: "PT", "PTT", "INR"  No results found for: "CKMB", "DIGOXIN"  No results found for: "TSH"  HDL   Date Value Ref Range Status   09/18/2020 53 > OR = 40 mg/dL Final     HDL, Direct   Date Value Ref Range Status   01/31/2023 54 >=40 mg/dL Final Comment:     Specimen collection should occur prior to Metamizole administration due to the potential for falsley depressed results. Triglycerides   Date Value Ref Range Status   01/31/2023 129 See Comment mg/dL Final     Comment:     Triglyceride:     0-9Y            <75mg/dL     10Y-17Y         <90 mg/dL       >=18Y     Normal          <150 mg/dL     Borderline High 150-199 mg/dL     High            200-499 mg/dL        Very High       >499 mg/dL    Specimen collection should occur prior to N-Acetylcysteine or Metamizole administration due to the potential for falsely depressed results. 09/18/2020 150 (H) <150 mg/dL Final      Hemoglobin A1C   Date Value Ref Range Status   09/14/2019 5.4 4.2 - 6.3 % Final     No results found for: "[place]ks", "SPUTUMCULTUR", "Lucía Aurora", "Waynoka Ariella", "Norval Fly", "BODYFLUIDCUL", "Espinoza Back", "INFLUAPCR", "INFLUBPCR", "RSVPCR", "Audria Crutch", "CDIFFTOXINB"    *-*-*-*-*-*-*-*-*-*-*-*-*-*-*-*-*-*-*-*-*-*-*-*-*-*-*-*-*-*-*-*-*-*-*-*-*-*-*-*-*-*-*-*-*-*-*-*-*-*-*-*-*-*-  RADIOLOGY RESULTS:  CT coronary calcium score    Result Date: 12/9/2023  Impression: Total coronary calcium score equals 128. For more useful information regarding the prognostic significance of the calcium score, please consult the calculator at the website https://www.danni-young.org/. aspx. Workstation performed: [id number]       *-*-*-*-*-*-*-*-*-*-*-*-*-*-*-*-*-*-*-*-*-*-*-*-*-*-*-*-*-*-*-*-*-*-*-*-*-*-*-*-*-*-*-*-*-*-*-*-*-*-*-*-*-*-  LAST ECHOCARDIOGRAM AND OTHER CARDIOLOGY RESULTS:  No results found for this or any previous visit. No results found for this or any previous visit. No results found for this or any previous visit. No results found for this or any previous visit.        *-*-*-*-*-*-*-*-*-*-*-*-*-*-*-*-*-*-*-*-*-*-*-*-*-*-*-*-*-*-*-*-*-*-*-*-*-*-*-*-*-*-*-*-*-*-*-*-*-*-*-*-*-*-  SIGNATURES:   @EXX@   Cody Munoz MD     CC:   DO Anselmo Goldman DO

## 2023-12-26 ENCOUNTER — HOSPITAL ENCOUNTER (OUTPATIENT)
Dept: NON INVASIVE DIAGNOSTICS | Facility: HOSPITAL | Age: 43
Discharge: HOME | End: 2023-12-26
Attending: INTERNAL MEDICINE
Payer: COMMERCIAL

## 2023-12-26 VITALS
SYSTOLIC BLOOD PRESSURE: 130 MMHG | DIASTOLIC BLOOD PRESSURE: 90 MMHG | BODY MASS INDEX: 29.54 KG/M2 | HEART RATE: 82 BPM | HEIGHT: 71 IN | WEIGHT: 211 LBS | OXYGEN SATURATION: 100 %

## 2023-12-26 DIAGNOSIS — R93.1 ELEVATED CORONARY ARTERY CALCIUM SCORE: ICD-10-CM

## 2023-12-26 DIAGNOSIS — Z82.49 FAMILY HISTORY OF HEART DISEASE: ICD-10-CM

## 2023-12-26 LAB
CHEST PAIN STATEMENT: NORMAL
MAX DIASTOLIC BP: 68 MMHG
MAX HR PERCENT: 97 %
MAX HR: 173 BPM
MAX PREDICTED HEART RATE: 177 BPM
PROTOCOL NAME: NORMAL
RATE PRESSURE PRODUCT: NORMAL
REASON FOR TERMINATION: NORMAL
SL CV STRESS RECOVERY BP: NORMAL MMHG
SL CV STRESS RECOVERY HR: 105 BPM
SL CV STRESS RECOVERY O2 SAT: 98 %
SL CV STRESS STAGE REACHED: 4
STRESS ANGINA INDEX: 0
STRESS BASELINE BP: NORMAL MMHG
STRESS BASELINE HR: 82 BPM
STRESS O2 SAT REST: 100 %
STRESS PEAK HR: 173 BPM
STRESS POST ESTIMATED WORKLOAD: 12.5 METS
STRESS POST EXERCISE DUR MIN: 10 MIN
STRESS POST EXERCISE DUR MIN: 10 MIN
STRESS POST EXERCISE DUR SEC: 30 SEC
STRESS POST EXERCISE DUR SEC: 30 SEC
STRESS POST O2 SAT PEAK: 96 %
STRESS POST PEAK BP: 188 MMHG
STRESS POST PEAK HR: 173 BPM
STRESS POST PEAK SYSTOLIC BP: 188 MMHG
TARGET HR FORMULA: NORMAL
TEST INDICATION: NORMAL

## 2023-12-26 PROCEDURE — 93018 CV STRESS TEST I&R ONLY: CPT | Performed by: INTERNAL MEDICINE

## 2023-12-26 PROCEDURE — 93017 CV STRESS TEST TRACING ONLY: CPT

## 2023-12-26 PROCEDURE — 93016 CV STRESS TEST SUPVJ ONLY: CPT | Performed by: INTERNAL MEDICINE

## 2024-01-26 ENCOUNTER — APPOINTMENT (OUTPATIENT)
Dept: LAB | Facility: MEDICAL CENTER | Age: 44
End: 2024-01-26
Payer: COMMERCIAL

## 2024-01-26 LAB
ALBUMIN SERPL BCP-MCNC: 4.6 G/DL (ref 3.5–5)
ALP SERPL-CCNC: 80 U/L (ref 34–104)
ALT SERPL W P-5'-P-CCNC: 45 U/L (ref 7–52)
ANION GAP SERPL CALCULATED.3IONS-SCNC: 5 MMOL/L
AST SERPL W P-5'-P-CCNC: 28 U/L (ref 13–39)
BILIRUB SERPL-MCNC: 0.55 MG/DL (ref 0.2–1)
BUN SERPL-MCNC: 19 MG/DL (ref 5–25)
CALCIUM SERPL-MCNC: 9.5 MG/DL (ref 8.4–10.2)
CHLORIDE SERPL-SCNC: 103 MMOL/L (ref 96–108)
CO2 SERPL-SCNC: 31 MMOL/L (ref 21–32)
CREAT SERPL-MCNC: 1.18 MG/DL (ref 0.6–1.3)
GFR SERPL CREATININE-BSD FRML MDRD: 75 ML/MIN/1.73SQ M
GLUCOSE P FAST SERPL-MCNC: 101 MG/DL (ref 65–99)
POTASSIUM SERPL-SCNC: 4.5 MMOL/L (ref 3.5–5.3)
PROT SERPL-MCNC: 6.7 G/DL (ref 6.4–8.4)
SODIUM SERPL-SCNC: 139 MMOL/L (ref 135–147)

## 2024-02-02 ENCOUNTER — AMB VIDEO VISIT (OUTPATIENT)
Dept: OTHER | Facility: HOSPITAL | Age: 44
End: 2024-02-02

## 2024-02-02 VITALS — RESPIRATION RATE: 12 BRPM | HEART RATE: 92 BPM | TEMPERATURE: 97.9 F

## 2024-02-02 DIAGNOSIS — J06.9 UPPER RESPIRATORY TRACT INFECTION, UNSPECIFIED TYPE: Primary | ICD-10-CM

## 2024-02-02 PROCEDURE — ECARE PR SL URGENT CARE VIRTUAL VISIT: Performed by: PHYSICIAN ASSISTANT

## 2024-02-02 RX ORDER — METHYLPREDNISOLONE 4 MG/1
TABLET ORAL
Qty: 21 TABLET | Refills: 0 | Status: SHIPPED | OUTPATIENT
Start: 2024-02-02

## 2024-02-02 NOTE — CARE ANYWHERE EVISITS
Visit Summary for LEXI COLE - Gender: Male - Date of Birth: 1980  Date: [id number] - Duration: 5 minutes  Patient: LEXI COLE  Provider: Shannon Severino PA-C    Patient Contact Information  Address  [place]; [place] [address]  [phone]    Visit Topics  Cold [Added By: Self - 2024-02-02]    Triage Questions   What is your current physical address in the event of a medical emergency? Answer []  Are you allergic to any medications? Answer []  Are you now or could you be pregnant? Answer []  Do you have any immune system compromise or chronic lung   disease? Answer []  Do you have any vulnerable family members in the home (infant, pregnant, cancer, elderly)? Answer []     Conversation Transcripts  [0A][0A] [Notification] You are connected with Shannon Severino PA-C, Urgent Care Specialist.[0A][Notification] LEXI COLE is located in [place].[0A][Notification] LEXI COLE has shared health history...[0A]    Diagnosis  Acute upper respiratory infection, unspecified    Procedures  Value: [id number] Code: CPT-4 UNLISTED E&M SERVICE    Medications Prescribed    No prescriptions ordered    Electronically signed by: Severino PA-C, Shannon(NPI [id number])

## 2024-02-02 NOTE — PATIENT INSTRUCTIONS
"Sinus infections are typically viral and will get better on their own in 7-10 days. You should try symptomatic relief in the meantime with OTC (Claritin or Zyrtec), Afrin up to 3 days then switch to Flonase, and Sudafed(behind the counter) and mucinex. You can also try sinus rinses with a neti pot or nasal lavage (be sure to use distilled water.) Sleep with a cool mist humidifier at your bedside. If your symptoms do not improve after 7-10 days, you may need antibiotics because it is more likely to be bacterial at that point.  Follow-up with your primary care physician for recheck in 2-3 business days. Go to the ER for sudden severe headache, high fevers, change in vision, seizure activity or anything else that is concerning.    [facility] Phone number is [phone] if you need assistance or have further questions  note in \"Letters\" section of [employer]. Can print if opened from a web browser    "

## 2024-02-02 NOTE — PROGRESS NOTES
Required Documentation:  Encounter provider Shannon D Severino, PA-C    Provider located at [facility]  VIRTUAL CARE   [address] [id number]    Identify all parties in room with patient during virtual visit:  No one else    The patient was identified by name and date of birth. Enrique Dow was informed that this is a telemedicine visit and that the visit is being conducted through the [facility] [employer] platform. He agrees to proceed..  My office door was closed. No one else was in the room.  He acknowledged consent and understanding of privacy and security of the video platform. The patient has agreed to participate and understands they can discontinue the visit at any time.    Verification of patient location:    Patient is located at home in the following state in which I hold an active license [place]    Patient is aware this is a billable service.     Reason for visit is No chief complaint on file.       Subjective  HPI   Pt reports cold sx x 1 month waxing and waning, worse over a few days. Reports a cough from PND, chest congestion, nasal congestion, some sinus pressure in ethmoid region. Tried mucinex without relief. No fevers, CP, SOB    Past Medical History:   Diagnosis Date    Anxiety     h/o    Calculus of ureter     Chronic tension headaches     Kidney stone     passed on own     Kidney stones     Panic attacks     h/o    PONV (postoperative nausea and vomiting)        Past Surgical History:   Procedure Laterality Date    KNEE SURGERY Bilateral     multiple,last assessed 1/2/17    REPAIR KNEE LIGAMENT      SCALP EXCISION N/A 2/23/2018    Procedure: EXCISION SCALP MASS;  Surgeon: Hetal Lopez MD;  Location: [facility];  Service: Plastics    WISDOM TOOTH EXTRACTION      one extracted        No Known Allergies    Review of Systems   Constitutional:  Negative for fever.   HENT:  Positive for congestion and postnasal drip. Negative for nosebleeds.     Eyes:  Negative for redness.   Respiratory:  Positive for cough. Negative for shortness of breath.    Cardiovascular:  Negative for chest pain.   Gastrointestinal:  Negative for blood in stool.   Genitourinary:  Negative for hematuria.   Musculoskeletal:  Negative for gait problem.   Skin:  Negative for rash.   Neurological:  Negative for seizures.   Psychiatric/Behavioral:  Negative for behavioral problems.        Video Exam    Vitals:    02/02/24 1325   Pulse: 92   Resp: 12   Temp: 97.9 °F (36.6 °C)       Physical Exam  Constitutional:       General: He is not in acute distress.     Appearance: Normal appearance. He is not toxic-appearing.   HENT:      Head: Normocephalic and atraumatic.      Nose: No rhinorrhea.      Mouth/Throat:      Mouth: Mucous membranes are moist.   Eyes:      Conjunctiva/sclera: Conjunctivae normal.   Pulmonary:      Effort: Pulmonary effort is normal. No respiratory distress.      Breath sounds: No wheezing (no gross audible wheeze through computer).   Musculoskeletal:      Cervical back: Normal range of motion.   Skin:     Findings: No rash (on face or neck).   Neurological:      Mental Status: He is alert.      Cranial Nerves: No dysarthria or facial asymmetry.   Psychiatric:         Mood and Affect: Mood normal.         Behavior: Behavior normal.         Visit Time  Total Visit Duration: 5 minutes    Assessment/Plan:    Diagnoses and all orders for this visit:    Upper respiratory tract infection, unspecified type  -     methylPREDNISolone 4 MG tablet therapy pack; Use as directed on package  -     phenylephrine (JUSTA-SYNEPHRINE) 0.5 % nasal spray; 1 spray by Each Nare route every 4 (four) hours as needed for congestion        Patient Instructions   Sinus infections are typically viral and will get better on their own in 7-10 days. You should try symptomatic relief in the meantime with OTC (Claritin or Zyrtec), Afrin up to 3 days then switch to Flonase, and Sudafed(behind the counter)  "and mucinex. You can also try sinus rinses with a neti pot or nasal lavage (be sure to use distilled water.) Sleep with a cool mist humidifier at your bedside. If your symptoms do not improve after 7-10 days, you may need antibiotics because it is more likely to be bacterial at that point.  Follow-up with your primary care physician for recheck in 2-3 business days. Go to the ER for sudden severe headache, high fevers, change in vision, seizure activity or anything else that is concerning.    [facility] Phone number is [phone] if you need assistance or have further questions  note in \"Letters\" section of [email or web address]. Can print if opened from a web browser    "

## 2024-02-09 ENCOUNTER — TELEMEDICINE (OUTPATIENT)
Dept: CARDIOLOGY CLINIC | Facility: CLINIC | Age: 44
End: 2024-02-09
Payer: COMMERCIAL

## 2024-02-09 VITALS — WEIGHT: 210 LBS | BODY MASS INDEX: 29.4 KG/M2 | HEIGHT: 71 IN

## 2024-02-09 DIAGNOSIS — E78.2 MIXED HYPERLIPIDEMIA: Primary | ICD-10-CM

## 2024-02-09 DIAGNOSIS — R93.1 ELEVATED CORONARY ARTERY CALCIUM SCORE: ICD-10-CM

## 2024-02-09 DIAGNOSIS — Z82.49 FAMILY HISTORY OF HEART DISEASE: ICD-10-CM

## 2024-02-09 PROCEDURE — 99203 OFFICE O/P NEW LOW 30 MIN: CPT | Performed by: INTERNAL MEDICINE

## 2024-02-09 NOTE — PROGRESS NOTES
[facility]  [address] [place] [address]  Phone#  [phone]. Fax#  [phone]  (PROVIDER LOCATION)  ENCOUNTER DATE: 02/09/24 2:34 PM  PATIENT NAME: Enrique Dow   1980    [id number]  Age: 43 y.o.      Sex: male  ENCOUNTER PROVIDER AND AUTHOR: Kai Noe MD, A  PRIMARYCARE PHYSICIAN: Enrique Tillman DO  *-*-*-*-*-*-*-*-*-*-*-*-*-*-*-*-*-*-*-*-*-*-*-*-*-*-*-*-*-*-*-*-*-*-*-*-*-*-*-*-*-*-*-*-*-*-*-*-*-*-*-*-*-*  VIRTUAL VISIT- TELEPHONE ENCOUNTER  After connecting through Telephone, the patient was identified by name and date of birth. he was informed that this is a telemedicine visit and that the visit is being conducted through Telephone.  My office door was closed. No one else was in the room.. he acknowledged consent and understanding of privacy and security of the video platform. The patient has agreed to participate and understands they can discontinue the visit at any time. Patient is aware this is a billable service.     *-*-*-*-*-*-*-*-*-*-*-*-*-*-*-*-*-*-*-*-*-*-*-*-*-*-*-*-*-*-*-*-*-*-*-*-*-*-*-*-*-*-*-*-*-*-*-*-*-*-*-*-*-*  CARDIOLOGY ASSESSMENT & PLAN:  1. Mixed hyperlipidemia  LDL cholesterol, direct    Lipid panel      2. Elevated coronary artery calcium score        3. Family history of heart disease          Elevated coronary artery calcium score  Mr. Enrique Dow is overall doing well from cardiac perspective with no recent symptoms that suggest angina pectoralis.  His exercise tolerance is good and he exercises regularly.  His most recent blood work indicates stable renal function and electrolytes.  His last lipid profile was significant for elevated LDL of 179 elevated non-HDL cholesterol of 170 and HDL of 50 mg/dL.  During his recent exercise stress test he exercised for 10-1/2 minutes and had no symptoms or ECG changes of ischemia.  Had appropriate blood pressure response to exercise.  Today we reviewed results of the stress test and his previously  noted to elevated coronary artery calcium scores.  Today be addressed these things and I am advising him that no further cardiac testing is needed at this time.  I would like his blood pressures and heart rates to be well-controlled and I would like him to continue healthy eating habits.  Some tips are provided to him during last visit.  Today we discussed the option of discontinuing statin use for now however he wants to see how much effect he has with the statin medication.  I am okay with him for primary prevention use until he has consistent 30 to 40% reduction in values from baseline.    -- He is advised to continue current medications.  -- He will follow-up with blood work in another 3 to 4 months to reassess his lipids.  I am advising him to reach out to our offic after the blood work so that we can go over the results.  -- We will tentatively arrange for a follow-up visit with us in 1 year time however if he is feeling fine and he has no significant symptoms or concerns and lipids are well-controlled he does not have to keep the appointment but he is is advised to letting us   -- Dietary and medical compliance are reinforced.  -- He is advised  to report any concerning symptoms such as chest pain, shortness of breath, decline in exercise tolerance or presyncope/syncope.      I have spent 20 minutes with Patient  today in which greater than 50% of this time was spent in counseling/coordination of care regarding Diagnostic results, Instructions for management, and Patient and family education.     IMPORTANT DIAGNOSES AND SUMMARY OF CARDIAC WORKUP:  DIAGNOSES:  1.  Elevated coronary artery calcium score  2.  Dyslipidemia  3.  History of renal calculi  4.  History of subcutaneous mass removal from the back  5.  History of meniscal tear of the knee and splitting of cruciate ligament    CORONARY ARTERY CALCIUM SCORE TESTING 12/9/2023:  Left main coronary artery:  10  Left anterior descending coronary artery and  diagonal branches:  89  Left circumflex coronary artery and left marginal branches:  23  Right coronary artery and right marginal branches:  6  Posterior descending coronary artery: 0  TOTAL coronary calcium score:  128    EXERCISE TREADMILL STRESS TEST 12/26/2023:   Stress ECG: No ST deviation is noted. Arrhythmias during recovery: rare PVCs. The ECG was negative for ischemia. The stress ECG is negative for ischemia after maximal exercise, without reproduction of symptoms.  ·  Stress ECG: A Charlie protocol stress test was performed. The patient reached stage 4.0 of the protocol after exercising for 10 min and 30 sec and had a maximal HR of 173 bpm (97% of MPHR) and 12.5 METS. The patient experienced no angina during the test.  ·  Resting ECG: ECG is normal. The ECG shows normal sinus rhythm at a rate of 82 bpm. Resting ECG shows no ST-segment deviation.    INTERVAL HISTORY & HISTORY OF PRESENT ILLNESS:  Enrique Dow is following with us for his cardiovascular medical problems including elevated coronary artery calcium, family history of premature CAD dyslipidemia and other comorbidities.  At last visit in December he was referred to undergo his treadmill stress test and pharmacologic and nonpharmacologic measures were added for  improvement in his lipid profile.    Please see having this visit virtually to go over the results of the stress test and to see if he needs to do something else.  He mentions that since last visit he has had no new diagnosis or hospitalizations or significant illnesses.  From a symptom perspective he has had no recent unusual chest pain or shortness of breath or dizziness or lightheadedness or palpitations.  He has had no passing out or near passing out episodes or orthopnea or PND or worsening pedal edema.  He remains active.  He has been taking the rosuvastatin and other medications consistently.    He does not smoke and he does not drink significantly .    His current cardiac  medications include aspirin 81 mg daily omega-3 fatty acids rosuvastatin 10 mg daily.    His last laboratory evaluation is from 1/26/2023:  Sodium was 139 potassium 4.5 chloride 103 bicarb 31 BUN 19 creatinine 1.18 GFR 75 liver function test were normal.    Last lipid profile from August 2023 was significant for total cholesterol 229 triglyceride 215 non-HDL cholesterol 179 LDL cholesterol 179 apolipoprotein B level 125, HDL cholesterol 50.    Last thyroid function test was in January 2023 and it was 2.5.  Last hemoglobin A1c was 5.4 in August 2023.     The 10-year ASCVD risk score (Clement MENDES, et al., 2019) is: 2.1%    Values used to calculate the score:      Age: 43 years      Sex: Male      Is Non- : No      Diabetic: No      Tobacco smoker: No      Systolic Blood Pressure: 138 mmHg      Is BP treated: No      HDL Cholesterol: 54 mg/dL      Total Cholesterol: 227 mg/dL  (Low risk: Less than <5%; borderline risk: ?5% to <7.5%; intermediate risk: ?7.5% to <20%; high risk:?20%)  Patient's ASCVD risk category: Low.    *-*-*-*-*-*-*-*-*-*-*-*-*-*-*-*-*-*-*-*-*-*-*-*-*-*-*-*-*-*-*-*-*-*-*-*-*-*-*-*-*-*-*-*-*-*-*-*-*-*-*-*-*-*  REVIEW OF SYMPTOMS:    Positive for: As noted above in HPI  Negative for: All remaining as reviewed below and in HPI. SYSTEM SYMPTOMS REVIEWED:  General--weight change, fever, night sweats  Respiratoryl-- Wheezing, shortness of breath, cough, URI symptoms, sputum, blood  Cardiovascular--chest pain, syncope, dyspnea on exertion, edema, decline in exercise tolerance, claudication   Gastrointestinal--persistent vomiting, diarrhea, abdominal distention, blood in stool   Muscular or skeletal--joint pain or swelling   Neurologic--headaches, syncope, abnormal movement  Hematologic--history of easy bruising and bleeding   Endocrine--thyroid enlargement, heat or cold intolerance, polyuria   Psychiatric--anxiety, depression   "    *-*-*-*-*-*-*-*-*-*-*-*-*-*-*-*-*-*-*-*-*-*-*-*-*-*-*-*-*-*-*-*-*-*-*-*-*-*-*-*-*-*-*-*-*-*-*-*-*-*-*-*-*-*-  VITAL SIGNS FROM PATIENT ( If available):  Vitals:    02/09/24 1031   Weight: 95.3 kg (210 lb)   Height: 5' 11\" (1.803 m)       *-*-*-*-*-*-*-*-*-*-*-*-*-*-*-*-*-*-*-*-*-*-*-*-*-*-*-*-*-*-*-*-*-*-*-*-*-*-*-*-*-*-*-*-*-*-*-*-*-*-*-*-*-*-  PHYSICAL EXAM: (NOT PERFORMED AS THIS IS A VIRTUAL VISIT).    Patient reports no abnormal findings including no `swelling of feet.     *-*-*-*-*-*-*-*-*-*-*-*-*-*-*-*-*-*-*-*-*-*-*-*-*-*-*-*-*-*-*-*-*-*-*-*-*-*-*-*-*-*-*-*-*-*-*-*-*-*-*-*-*-*-  CURRENT MEDICATION LIST:    Current Outpatient Medications:     aspirin (ECOTRIN LOW STRENGTH) 81 mg EC tablet, Take 81 mg by mouth daily, Disp: , Rfl:     cholecalciferol (VITAMIN D3) 400 units tablet, Take 400 Units by mouth daily, Disp: , Rfl:     fish oil-omega-3 fatty acids 1000 MG capsule, Take 2 g by mouth daily, Disp: , Rfl:     methylPREDNISolone 4 MG tablet therapy pack, Use as directed on package, Disp: 21 tablet, Rfl: 0    Multiple Vitamin (MULTIVITAMIN) tablet, Take 1 tablet by mouth daily With Vitamin K2, Disp: , Rfl:     phenylephrine (JUSTA-SYNEPHRINE) 0.5 % nasal spray, 1 spray by Each Nare route every 4 (four) hours as needed for congestion, Disp: 15 mL, Rfl: 0    Probiotic Product (SOLUBLE FIBER/PROBIOTICS PO), Take 1 capsule by mouth daily, Disp: , Rfl:     rosuvastatin (CRESTOR) 10 MG tablet, Take 1 tablet (10 mg total) by mouth daily, Disp: 90 tablet, Rfl: 3    ALLERGIES:  No Known Allergies    *-*-*-*-*-*-*-*-*-*-*-*-*-*-*-*-*-*-*-*-*-*-*-*-*-*-*-*-*-*-*-*-*-*-*-*-*-*-*-*-*-*-*-*-*-*-*-*-*-*-*-*-*-*-  LABORATORY DATA: I have personally reviewed the available laboratory data.    CMP:  Sodium   Date Value Ref Range Status   01/26/2024 139 135 - 147 mmol/L Final   01/31/2023 140 135 - 147 mmol/L Final   08/31/2021 139 136 - 145 mmol/L Final   09/18/2020 138 135 - 146 mmol/L Final     Potassium   Date Value Ref " Range Status   01/26/2024 4.5 3.5 - 5.3 mmol/L Final   01/31/2023 4.8 3.5 - 5.3 mmol/L Final   08/31/2021 4.7 3.5 - 5.3 mmol/L Final   09/18/2020 5.1 3.5 - 5.3 mmol/L Final     Chloride   Date Value Ref Range Status   01/26/2024 103 96 - 108 mmol/L Final   01/31/2023 104 96 - 108 mmol/L Final   08/31/2021 106 100 - 108 mmol/L Final   09/18/2020 101 98 - 110 mmol/L Final     CO2   Date Value Ref Range Status   01/26/2024 31 21 - 32 mmol/L Final   01/31/2023 30 21 - 32 mmol/L Final   08/31/2021 30 21 - 32 mmol/L Final   09/18/2020 25 20 - 32 mmol/L Final     BUN   Date Value Ref Range Status   01/26/2024 19 5 - 25 mg/dL Final   01/31/2023 24 5 - 25 mg/dL Final   08/31/2021 18 5 - 25 mg/dL Final   09/18/2020 17 7 - 25 mg/dL Final     Creatinine   Date Value Ref Range Status   01/26/2024 1.18 0.60 - 1.30 mg/dL Final     Comment:     Standardized to IDMS reference method   01/31/2023 1.23 0.60 - 1.30 mg/dL Final     Comment:     Standardized to IDMS reference method   08/31/2021 0.99 0.60 - 1.30 mg/dL Final     Comment:     Standardized to IDMS reference method     Calcium   Date Value Ref Range Status   01/26/2024 9.5 8.4 - 10.2 mg/dL Final   01/31/2023 9.7 8.3 - 10.1 mg/dL Final   08/31/2021 8.9 8.3 - 10.1 mg/dL Final   09/18/2020 9.9 8.6 - 10.3 mg/dL Final     Alkaline Phosphatase   Date Value Ref Range Status   01/26/2024 80 34 - 104 U/L Final   01/31/2023 72 46 - 116 U/L Final   08/31/2021 73 46 - 116 U/L Final   09/18/2020 68 36 - 130 U/L Final     ALT   Date Value Ref Range Status   01/26/2024 45 7 - 52 U/L Final     Comment:     Specimen collection should occur prior to Sulfasalazine administration due to the potential for falsely depressed results.    01/31/2023 45 12 - 78 U/L Final     Comment:     Specimen collection should occur prior to Sulfasalazine and/or Sulfapyridine administration due to the potential for falsely depressed results.    08/31/2021 40 12 - 78 U/L Final     Comment:     Specimen collection  "should occur prior to Sulfasalazine and/or Sulfapyridine administration due to the potential for falsely depressed results.    09/18/2020 43 9 - 46 U/L Final     AST   Date Value Ref Range Status   01/26/2024 28 13 - 39 U/L Final   01/31/2023 30 5 - 45 U/L Final     Comment:     Specimen collection should occur prior to Sulfasalazine administration due to the potential for falsely depressed results.    08/31/2021 27 5 - 45 U/L Final     Comment:     Specimen collection should occur prior to Sulfasalazine administration due to the potential for falsely depressed results.    09/18/2020 29 10 - 40 U/L Final    No results found for: \"PROLACTIN\"  No results found for: \"MG\"  No results found for: \"PHOS\" Cardiac Profile: No results found for: \"TROPONINI\", \"CKMB\", \"NTBNP\", \"DIGOXIN\"  Hemoglobin A1C   Date Value Ref Range Status   09/14/2019 5.4 4.2 - 6.3 % Final   08/31/2018 5.6 4.2 - 6.3 % Final     HDL   Date Value Ref Range Status   09/18/2020 53 > OR = 40 mg/dL Final     HDL, Direct   Date Value Ref Range Status   01/31/2023 54 >=40 mg/dL Final     Comment:     Specimen collection should occur prior to Metamizole administration due to the potential for falsley depressed results.     Triglycerides   Date Value Ref Range Status   01/31/2023 129 See Comment mg/dL Final     Comment:     Triglyceride:     0-9Y            <75mg/dL     10Y-17Y         <90 mg/dL       >=18Y     Normal          <150 mg/dL     Borderline High 150-199 mg/dL     High            200-499 mg/dL        Very High       >499 mg/dL    Specimen collection should occur prior to N-Acetylcysteine or Metamizole administration due to the potential for falsely depressed results.   09/18/2020 150 (H) <150 mg/dL Final         CBC:   WBC   Date Value Ref Range Status   01/31/2023 6.42 4.31 - 10.16 Thousand/uL Final   08/31/2021 6.08 4.31 - 10.16 Thousand/uL Final   01/12/2018 6.41 4.31 - 10.16 Thousand/uL Final     White Blood Cell Count   Date Value Ref Range " "Status   09/18/2020 7.9 3.8 - 10.8 Thousand/uL Final     Hemoglobin   Date Value Ref Range Status   01/31/2023 16.6 12.0 - 17.0 g/dL Final   08/31/2021 15.5 12.0 - 17.0 g/dL Final   09/18/2020 16.7 13.2 - 17.1 g/dL Final   01/12/2018 16.5 12.0 - 17.0 g/dL Final     Platelet Count   Date Value Ref Range Status   09/18/2020 295 140 - 400 Thousand/uL Final     Platelets   Date Value Ref Range Status   01/31/2023 274 149 - 390 Thousands/uL Final   08/31/2021 260 149 - 390 Thousands/uL Final   01/12/2018 295 149 - 390 Thousands/uL Final     PT/INR: No results found for: \"PT\", \"PTT\", \"INR\" Microbiology:          *-*-*-*-*-*-*-*-*-*-*-*-*-*-*-*-*-*-*-*-*-*-*-*-*-*-*-*-*-*-*-*-*-*-*-*-*-*-*-*-*-*-*-*-*-*-*-*-*-*-*-*-*-*-  PREVIOUS CARDIOLOGY & RADIOLOGY RESULTS:  No results found for this or any previous visit.    No results found for this or any previous visit.    No results found for this or any previous visit.    No results found for this or any previous visit.    Stress test only, exercise    Stress ECG: No ST deviation is noted. Arrhythmias during recovery: rare   PVCs. The ECG was negative for ischemia. The stress ECG is negative for   ischemia after maximal exercise, without reproduction of symptoms.    Stress ECG: A Charlie protocol stress test was performed. The patient   reached stage 4.0 of the protocol after exercising for 10 min and 30 sec   and had a maximal HR of 173 bpm (97% of MPHR) and 12.5 METS. The patient   experienced no angina during the test.    Resting ECG: ECG is normal. The ECG shows normal sinus rhythm at a rate   of 82 bpm. Resting ECG shows no ST-segment deviation.  Stress strip  Confirmed by AIDEN WATTERS ([id number]),  Lexa Crews ([id number]) on 12/26/2023 2:47:38 PM        *-*-*-*-*-*-*-*-*-*-*-*-*-*-*-*-*-*-*-*-*-*-*-*-*-*-*-*-*-*-*-*-*-*-*-*-*-*-*-*-*-*-*-*-*-*-*-*-*-*-*-*-*-*-  SIGNATURES:   @SIG@   Kai Noe MD "     *-*-*-*-*-*-*-*-*-*-*-*-*-*-*-*-*-*-*-*-*-*-*-*-*-*-*-*-*-*-*-*-*-*-*-*-*-*-*-*-*-*-*-*-*-*-*-*-*-*-*-*-*-*-    *-*-*-*-*-*-*-*-*-*-*-*-*-*-*-*-*-*-*-*-*-*-*-*-*-*-*-*-*-*-*-*-*-*-*-*-*-*-*-*-*-*-*-*-*-*-*-*-*-*-*-*-*-*  PAST MEDICAL HISTORY:  Past Medical History:   Diagnosis Date    Anxiety     h/o    Calculus of ureter     Chronic tension headaches     Kidney stone     passed on own     Kidney stones     Panic attacks     h/o    PONV (postoperative nausea and vomiting)     PAST SURGICAL HISTORY:   Past Surgical History:   Procedure Laterality Date    KNEE SURGERY Bilateral     multiple,last assessed 1/2/17    REPAIR KNEE LIGAMENT      SCALP EXCISION N/A 2/23/2018    Procedure: EXCISION SCALP MASS;  Surgeon: Hetal Lopez MD;  Location: [facility];  Service: Plastics    WISDOM TOOTH EXTRACTION      one extracted         FAMILY HISTORY:  Family History   Problem Relation Age of Onset    Other Father         arteriosclerosis of autologous arterial coronary artery bypass graft    Nephrolithiasis Father         calcium    Gout Father     SOCIAL HISTORY:  Social History     Tobacco Use   Smoking Status Never   Smokeless Tobacco Never   Tobacco Comments          Social History     Substance and Sexual Activity   Alcohol Use Not Currently    Alcohol/week: 2.0 standard drinks of alcohol    Types: 2 Glasses of wine per week    Comment: social     Social History     Substance and Sexual Activity   Drug Use No    [unfilled]     *-*-*-*-*-*-*-*-*-*-*-*-*-*-*-*-*-*-*-*-*-*-*-*-*-*-*-*-*-*-*-*-*-*-*-*-*-*-*-*-*-*-*-*-*-*-*-*-*-*-*-*-*-*

## 2024-02-10 NOTE — ASSESSMENT & PLAN NOTE
Mr. Enrique Dow is overall doing well from cardiac perspective with no recent symptoms that suggest angina pectoralis.  His exercise tolerance is good and he exercises regularly.  His most recent blood work indicates stable renal function and electrolytes.  His last lipid profile was significant for elevated LDL of 179 elevated non-HDL cholesterol of 170 and HDL of 50 mg/dL.  During his recent exercise stress test he exercised for 10-1/2 minutes and had no symptoms or ECG changes of ischemia.  Had appropriate blood pressure response to exercise.  Today we reviewed results of the stress test and his previously noted to elevated coronary artery calcium scores.  Today be addressed these things and I am advising him that no further cardiac testing is needed at this time.  I would like his blood pressures and heart rates to be well-controlled and I would like him to continue healthy eating habits.  Some tips are provided to him during last visit.  Today we discussed the option of discontinuing statin use for now however he wants to see how much effect he has with the statin medication.  I am okay with him for primary prevention use until he has consistent 30 to 40% reduction in values from baseline.    -- He is advised to continue current medications.  -- He will follow-up with blood work in another 3 to 4 months to reassess his lipids.  I am advising him to reach out to our offic after the blood work so that we can go over the results.  -- We will tentatively arrange for a follow-up visit with us in 1 year time however if he is feeling fine and he has no significant symptoms or concerns and lipids are well-controlled he does not have to keep the appointment but he is is advised to letting us   -- Dietary and medical compliance are reinforced.  -- He is advised  to report any concerning symptoms such as chest pain, shortness of breath, decline in exercise tolerance or presyncope/syncope.

## 2024-02-10 NOTE — PATIENT INSTRUCTIONS
CARDIOLOGY ASSESSMENT & PLAN:  1. Mixed hyperlipidemia  LDL cholesterol, direct    Lipid panel      2. Elevated coronary artery calcium score        3. Family history of heart disease          Elevated coronary artery calcium score  Mr. Enrique Dow is overall doing well from cardiac perspective with no recent symptoms that suggest angina pectoralis.  His exercise tolerance is good and he exercises regularly.  His most recent blood work indicates stable renal function and electrolytes.  His last lipid profile was significant for elevated LDL of 179 elevated non-HDL cholesterol of 170 and HDL of 50 mg/dL.  During his recent exercise stress test he exercised for 10-1/2 minutes and had no symptoms or ECG changes of ischemia.  Had appropriate blood pressure response to exercise.  Today we reviewed results of the stress test and his previously noted to elevated coronary artery calcium scores.  Today be addressed these things and I am advising him that no further cardiac testing is needed at this time.  I would like his blood pressures and heart rates to be well-controlled and I would like him to continue healthy eating habits.  Some tips are provided to him during last visit.  Today we discussed the option of discontinuing statin use for now however he wants to see how much effect he has with the statin medication.  I am okay with him for primary prevention use until he has consistent 30 to 40% reduction in values from baseline.    -- He is advised to continue current medications.  -- He will follow-up with blood work in another 3 to 4 months to reassess his lipids.  I am advising him to reach out to our offic after the blood work so that we can go over the results.  -- We will tentatively arrange for a follow-up visit with us in 1 year time however if he is feeling fine and he has no significant symptoms or concerns and lipids are well-controlled he does not have to keep the appointment but he is is advised to  letting us   -- Dietary and medical compliance are reinforced.  -- He is advised  to report any concerning symptoms such as chest pain, shortness of breath, decline in exercise tolerance or presyncope/syncope.      I have spent 20 minutes with Patient  today in which greater than 50% of this time was spent in counseling/coordination of care regarding Diagnostic results, Instructions for management, and Patient and family education.

## 2024-08-30 ENCOUNTER — OFFICE VISIT (OUTPATIENT)
Dept: PHYSICAL THERAPY | Facility: MEDICAL CENTER | Age: 44
End: 2024-08-30
Payer: COMMERCIAL

## 2024-08-30 DIAGNOSIS — M54.9 UPPER BACK PAIN ON RIGHT SIDE: Primary | ICD-10-CM

## 2024-08-30 PROCEDURE — 97161 PT EVAL LOW COMPLEX 20 MIN: CPT

## 2024-08-30 PROCEDURE — 97140 MANUAL THERAPY 1/> REGIONS: CPT

## 2024-08-30 PROCEDURE — 97112 NEUROMUSCULAR REEDUCATION: CPT

## 2024-08-30 NOTE — PROGRESS NOTES
PT Evaluation     Today's date: 2024  Patient name: Enrique Dow  : 1980  MRN: [id number]  Referring provider: Kiah Tomlin,*  Dx:   Encounter Diagnosis     ICD-10-CM    1. Upper back pain on right side  M54.9                      Assessment  Impairments: abnormal muscle firing, abnormal muscle tone, abnormal or restricted ROM, abnormal movement, activity intolerance, impaired physical strength, lacks appropriate home exercise program and pain with function    Assessment details: Enrique Dow  is a pleasant 44 y.o. male who presents with right sided scapular pain.  The primary movement problem is s/s consistent with cervical and thoracic facet dysfunction (C7-T1)resulting in limited ROM, strength deficit, poor movement pattern, and referred pain, which limit his ability to perform ADLs, IADLs and recreational activity.  No referral is necessary at this time based on examination results.   The patient's greatest concerns is not being able to stay active.     Problem List:  1) cervical facet dysfunction (c7-T1)  2) ROM deficit  3) movement pattern dysfunction     Etiologic factors include poor posture.  Pt. will benefit from skilled PT services that includes manual therapy techniques to enhance tissue extensibility, neuromuscular re-education to facilitate motor control, therapeutic exercise to increase functional mobility, and modalities prn to reduce pain and inflammation.  Understanding of Dx/Px/POC: good     Prognosis: good  Prognosis details: Positive prognostic indicators: motivation to improve   Negative prognostic indicators: chronicity of symptoms    Goals  Short Term Goals: to be achieved by 4 weeks  1) Patient to be independent with basic HEP.  2) Decrease pain to 1/10 at its worst.  3) Increase lcervical spine ROM by 10% in all deficient planes.   4) Increase UE strength by 1/2 MMT grade in all deficient planes.  5) Patient to report decreased sleep interruption secondary to  pain.  6) Increase exercise tolerance to 30 minutes with no pain    Long Term Goals: to be achieved by discharge  1) FOTO equal to or greater than 82.  2) Patient to be independent with comprehensive HEP.  3) Abolish pain for improved quality of life.  4) cervical spine ROM WNL all planes to improve a/iadls.  5) Increase UE strength to 5/5 MMT grade in all planes to improve a/iadls.  6) Patient to report no sleep interruption secondary to pain.  7) Increase exercise tolerance to full routine without pain.         Plan  Patient would benefit from: skilled physical therapy  Planned modality interventions: low level laser therapy, TENS and cryotherapy    Planned therapy interventions: joint mobilization, manual therapy, massage, neuromuscular re-education, patient education, postural training, strengthening, stretching, therapeutic activities, therapeutic exercise, flexibility, functional ROM exercises, graded exercise, home exercise program, IASTM, kinesiology taping and nerve gliding    Treatment plan discussed with: patient  Plan details: Prognosis above is given PT services 2x/week tapering to 1x/week over the next 2 months and home program adherence.      Subjective Evaluation    History of Present Illness  Mechanism of injury: Enrique Dow presents with c/c of R sided scapular pain. Symptoms began 8/28/24 with mechanism of injury: insidious onset. Pt denies injury or mechanism. He reports that he woke up with the pain. He reports that Thursday he started to feel N/T down lateral arm and into triceps.   Aggravating factors: sitting up straight, elevating arm  Relieving factors: kamryn pose, advil,   24hr pain pattern: 2/10 (current), 2/10 (best), 10/10 (worst), location: R upper back between scap and spine, down R arm into lateral elbow and tricpes, descriptors: sharp, shooting  Imaging: none  Previous treatments: none  Occupation/recreation: strength training, cardio  Primary concern: not being able to stay  active  Patient goals: get back to normal        Objective     Postural Observations  Seated posture: poor  Standing posture: fair      Palpation     Right   Hypertonic in the cervical paraspinals.   Tenderness of the cervical paraspinals.     Tenderness   Cervical Spine   Tenderness in the facet joint (C7-T2).     Active Range of Motion   Cervical/Thoracic Spine       Cervical  Subcranial protraction:  WFL   Subcranial retraction: Active cervical subcranial retraction: 50%  with pain   Flexion: Neck active flexion: 60%  with pain  Extension: Neck active extension: 50%      Left lateral flexion:  WFL  Right lateral flexion:  WFL  Left rotation: Neck active rotation left: 70% with pain  Right rotation: Neck active rotation right: 80%       Joint Play     Hypomobile: C6, C7, T1, T2, T3, T4, T5, T6, T7, T8, 1st rib, 2nd rib and 3rd rib     Pain: C6, C7, T1, T2, T3, 1st rib, 2nd rib and 3rd rib   Mechanical Assessment    Cervical    Seated retraction: repeated movements   Pain location: centralized  Pain intensity: better  Pain level: decreased    Thoracic      Lumbar      Strength/Myotome Testing   Cervical Spine     Left   Normal strength    Right Shoulder     Planes of Motion   Flexion: 4+   Abduction: 4+   External rotation at 0°: 4+   Internal rotation at 0°: 4+     Right Elbow   Flexion: 4+  Extension: 4    General Comments:    Upper quarter screen   Elbow: unremarkable  Hand/wrist: unremarkable    Shoulder Comments   Pain with Right shoulder flexion and ER BTB             Precautions: none    HEP: repeated retraction ext, thoracic ext  Manuals 8/30            UPAs cervical JH C6-T2 gr 3                                                   Neuro Re-Ed             Scap 4             Prone row,I,t,y                                                                 HEP review and pt education 10'            Ther Ex             UBE             Thoracic ext             Open books             Tread the needle                                                                  Ther Activity                                       Gait Training                                       Modalities

## 2024-09-03 ENCOUNTER — APPOINTMENT (OUTPATIENT)
Dept: PHYSICAL THERAPY | Facility: MEDICAL CENTER | Age: 44
End: 2024-09-03
Payer: COMMERCIAL

## 2024-09-06 ENCOUNTER — OFFICE VISIT (OUTPATIENT)
Dept: PHYSICAL THERAPY | Facility: MEDICAL CENTER | Age: 44
End: 2024-09-06
Payer: COMMERCIAL

## 2024-09-06 DIAGNOSIS — M54.9 UPPER BACK PAIN ON RIGHT SIDE: Primary | ICD-10-CM

## 2024-09-06 PROCEDURE — 97110 THERAPEUTIC EXERCISES: CPT

## 2024-09-06 PROCEDURE — 97140 MANUAL THERAPY 1/> REGIONS: CPT

## 2024-09-06 NOTE — PROGRESS NOTES
Daily Note     Today's date: 2024  Patient name: Enrique Dow  : 1980  MRN: [id number]  Referring provider: Kiah Tomlin,*  Dx:   Encounter Diagnosis     ICD-10-CM    1. Upper back pain on right side  M54.9                      Subjective: Patient reports that he went to pain management and was given two meds. He reports that he feels about the same.       Objective: See treatment diary below      Assessment: Patient presents with hypertonicity of right upper trap and scalenes. With elevation, patient has 1st rib dyfunction compressing nerves causing distal symptoms. Following self 1st rib mob, patient had improvement in symptoms with elevation however not complete abolished. Provided patient with Updated HEP of 1st rib mob and overpressure with repeated movements. Tolerated treatment well. Patient demonstrated fatigue post treatment, exhibited good technique with therapeutic exercises, and would benefit from continued PT      Plan: Continue per plan of care.      Precautions: none    HEP: repeated retraction ext with OP, thoracic ext, 1st rib mob  Manuals             UPAs cervical JH C6-T2 gr 3                                                   Neuro Re-Ed             Scap 4             Prone row,I,t,y             1st rib self mob X10 SB, x5 elevation            Repeated ret/ext with OP x20                                      HEP review and pt education             Ther Ex             UBE             Thoracic ext             Open books             Tread the needle                                                                 Ther Activity                                       Gait Training                                       Modalities

## 2024-09-09 ENCOUNTER — APPOINTMENT (OUTPATIENT)
Dept: PHYSICAL THERAPY | Facility: MEDICAL CENTER | Age: 44
End: 2024-09-09
Payer: COMMERCIAL

## 2024-09-12 ENCOUNTER — OFFICE VISIT (OUTPATIENT)
Dept: PHYSICAL THERAPY | Facility: MEDICAL CENTER | Age: 44
End: 2024-09-12
Payer: COMMERCIAL

## 2024-09-12 DIAGNOSIS — M54.9 UPPER BACK PAIN ON RIGHT SIDE: Primary | ICD-10-CM

## 2024-09-12 PROCEDURE — 97140 MANUAL THERAPY 1/> REGIONS: CPT

## 2024-09-12 PROCEDURE — 97110 THERAPEUTIC EXERCISES: CPT

## 2024-09-12 NOTE — PROGRESS NOTES
Daily Note     Today's date: 2024  Patient name: Enrique Dow  : 1980  MRN: [id number]  Referring provider: Kiah Tomlin,*  Dx:   Encounter Diagnosis     ICD-10-CM    1. Upper back pain on right side  M54.9                      Subjective: Patient reports that he feels better. He reports that his pain is more soreness and an ache.       Objective: See treatment diary below      Assessment: Continued with Poc based on patient symptom improvement. Tolerated treatment well. Patient demonstrated fatigue post treatment, exhibited good technique with therapeutic exercises, and would benefit from continued PT      Plan: Continue per plan of care.      Precautions: none    HEP: repeated retraction ext with OP, thoracic ext, 1st rib mob  Manuals             UPAs cervical JH C6-T2 gr 3                                                   Neuro Re-Ed             Scap 4 Rtb             Prone row,I,t,y             1st rib self mob X10 SB, x5 elevation            Repeated ret/ext with OP x20                                      HEP review and pt education             Ther Ex             UBE             Thoracic ext             Open books             Tread the needle                                                                 Ther Activity                                       Gait Training                                       Modalities

## 2024-09-13 ENCOUNTER — APPOINTMENT (OUTPATIENT)
Dept: LAB | Facility: MEDICAL CENTER | Age: 44
End: 2024-09-13
Payer: COMMERCIAL

## 2024-09-13 DIAGNOSIS — E78.2 MIXED HYPERLIPIDEMIA: ICD-10-CM

## 2024-09-13 LAB
CHOLEST SERPL-MCNC: 139 MG/DL
HDLC SERPL-MCNC: 52 MG/DL
LDLC SERPL CALC-MCNC: 75 MG/DL (ref 0–100)
LDLC SERPL DIRECT ASSAY-MCNC: 81 MG/DL (ref 0–100)
NONHDLC SERPL-MCNC: 87 MG/DL
TRIGL SERPL-MCNC: 60 MG/DL

## 2024-09-13 PROCEDURE — 80061 LIPID PANEL: CPT

## 2024-09-13 PROCEDURE — 83721 ASSAY OF BLOOD LIPOPROTEIN: CPT

## 2024-09-13 PROCEDURE — 36415 COLL VENOUS BLD VENIPUNCTURE: CPT

## 2024-09-16 ENCOUNTER — OFFICE VISIT (OUTPATIENT)
Dept: PHYSICAL THERAPY | Facility: MEDICAL CENTER | Age: 44
End: 2024-09-16
Payer: COMMERCIAL

## 2024-09-16 DIAGNOSIS — M54.9 UPPER BACK PAIN ON RIGHT SIDE: Primary | ICD-10-CM

## 2024-09-16 PROCEDURE — 97110 THERAPEUTIC EXERCISES: CPT

## 2024-09-16 PROCEDURE — 97140 MANUAL THERAPY 1/> REGIONS: CPT

## 2024-09-16 NOTE — PROGRESS NOTES
Daily Note     Today's date: 2024  Patient name: Enrique Dow  : 1980  MRN: [id number]  Referring provider: Kiah Tomlin,*  Dx:   Encounter Diagnosis     ICD-10-CM    1. Upper back pain on right side  M54.9                      Subjective: Patient reports that he feels better.       Objective: See treatment diary below      Assessment: Continued with Poc based on patient symptom improvement. Tolerated treatment well. Patient demonstrated fatigue post treatment, exhibited good technique with therapeutic exercises, and would benefit from continued PT      Plan: Continue per plan of care.      Precautions: none    HEP: repeated retraction ext with OP, thoracic ext, 1st rib mob  Manuals             UPAs cervical JH C6-T2 gr 3                                                   Neuro Re-Ed             Scap 4 Rtb 2x10            Prone row,I,t,y             1st rib self mob X10 SB, x5 elevation            Repeated ret/ext with OP x20            No monies 2x10 rtb            Band pull apart 2x10 rtb            HEP review and pt education             Ther Ex             UBE             Thoracic ext             Open books             Tread the needle                                                                 Ther Activity                                       Gait Training                                       Modalities                                            
75 yo male presents with BPH and c/o nocturia every 2 hours, urgency and urinary frequency. He currently takes tamsulosin and finasteride but states that "they are no longer helping and symptoms have worsened".

## 2024-09-20 ENCOUNTER — OFFICE VISIT (OUTPATIENT)
Dept: PHYSICAL THERAPY | Facility: MEDICAL CENTER | Age: 44
End: 2024-09-20
Payer: COMMERCIAL

## 2024-09-20 DIAGNOSIS — M54.9 UPPER BACK PAIN ON RIGHT SIDE: Primary | ICD-10-CM

## 2024-09-20 PROCEDURE — 97110 THERAPEUTIC EXERCISES: CPT

## 2024-09-20 PROCEDURE — 97140 MANUAL THERAPY 1/> REGIONS: CPT

## 2024-09-20 NOTE — PROGRESS NOTES
Daily Note     Today's date: 2024  Patient name: Enrique Dow  : 1980  MRN: [id number]  Referring provider: Kiah Tomlin,*  Dx:   Encounter Diagnosis     ICD-10-CM    1. Upper back pain on right side  M54.9                      Subjective: Patient reports that he feels better. He reports that he has some trouble sleeping on his right side.      Objective: See treatment diary below      Assessment: Progressed strengthening to patient tolerance.Tolerated treatment well. Patient demonstrated fatigue post treatment, exhibited good technique with therapeutic exercises, and would benefit from continued PT      Plan: Continue per plan of care.      Precautions: none    HEP: repeated retraction ext with OP, thoracic ext, 1st rib mob  Manuals             UPAs cervical JH C6-T2 gr 3                                                   Neuro Re-Ed             Scap 4 2x10 gtb            Prone row,I,t,y I, T x6 6s            1st rib self mob X10 SB, x5 elevation            Repeated ret/ext with OP x20            No monies 2x10 rtb            Band pull apart 2x10 rtb            Horizontal abd 2x10 rtb            FR on wall 3x8            HEP review and pt education             Ther Ex             UBE 3'f 3'b            Thoracic ext             Open books             Tread the needle                                                                 Ther Activity                                       Gait Training                                       Modalities

## 2024-10-24 ENCOUNTER — RA CDI HCC (OUTPATIENT)
Dept: OTHER | Facility: HOSPITAL | Age: 44
End: 2024-10-24

## 2024-10-24 PROBLEM — U07.1 COVID-19: Status: RESOLVED | Noted: 2022-07-20 | Resolved: 2024-10-24

## 2024-10-24 NOTE — PROGRESS NOTES
HCC coding opportunities       Chart reviewed, no opportunity found: CHART REVIEWED, NO OPPORTUNITY FOUND        Patients Insurance        Commercial Insurance: [employer]       
yes

## 2024-11-25 DIAGNOSIS — Z82.49 FAMILY HISTORY OF HEART DISEASE: ICD-10-CM

## 2024-11-25 DIAGNOSIS — E78.5 HYPERLIPIDEMIA, UNSPECIFIED HYPERLIPIDEMIA TYPE: ICD-10-CM

## 2024-11-25 NOTE — TELEPHONE ENCOUNTER
Office received notification from [facility] stating that the patient is needing refill on crestor 10MG. The patient is a patient of Dr. Noe.

## 2024-11-27 RX ORDER — ROSUVASTATIN CALCIUM 10 MG/1
10 TABLET, COATED ORAL DAILY
Qty: 90 TABLET | Refills: 1 | Status: SHIPPED | OUTPATIENT
Start: 2024-11-27

## 2025-02-14 ENCOUNTER — APPOINTMENT (OUTPATIENT)
Dept: RADIOLOGY | Facility: MEDICAL CENTER | Age: 45
End: 2025-02-14
Payer: COMMERCIAL

## 2025-02-14 ENCOUNTER — OFFICE VISIT (OUTPATIENT)
Dept: OBGYN CLINIC | Facility: MEDICAL CENTER | Age: 45
End: 2025-02-14
Payer: COMMERCIAL

## 2025-02-14 VITALS — HEIGHT: 71 IN | BODY MASS INDEX: 30.24 KG/M2 | WEIGHT: 216 LBS

## 2025-02-14 DIAGNOSIS — S93.492A SPRAIN OF ANTERIOR TALOFIBULAR LIGAMENT OF LEFT ANKLE, INITIAL ENCOUNTER: ICD-10-CM

## 2025-02-14 DIAGNOSIS — G89.29 CHRONIC PAIN OF LEFT ANKLE: ICD-10-CM

## 2025-02-14 DIAGNOSIS — M19.072 OSTEOARTHRITIS OF LEFT ANKLE, UNSPECIFIED OSTEOARTHRITIS TYPE: ICD-10-CM

## 2025-02-14 DIAGNOSIS — M25.572 CHRONIC PAIN OF LEFT ANKLE: Primary | ICD-10-CM

## 2025-02-14 DIAGNOSIS — G89.29 CHRONIC PAIN OF LEFT ANKLE: Primary | ICD-10-CM

## 2025-02-14 DIAGNOSIS — M25.572 CHRONIC PAIN OF LEFT ANKLE: ICD-10-CM

## 2025-02-14 PROCEDURE — 99204 OFFICE O/P NEW MOD 45 MIN: CPT | Performed by: EMERGENCY MEDICINE

## 2025-02-14 PROCEDURE — 73610 X-RAY EXAM OF ANKLE: CPT

## 2025-02-14 NOTE — PROGRESS NOTES
"    Assessment/Plan:    Diagnoses and all orders for this visit:    Chronic pain of left ankle  -     XR ankle 3+ vw left; Future  -     MRI ankle/heel left  wo contrast; Future  -     Ambulatory Referral to Orthopedic Surgery; Future    Sprain of anterior talofibular ligament of left ankle, initial encounter  -     MRI ankle/heel left  wo contrast; Future  -     Ambulatory Referral to Orthopedic Surgery; Future    Osteoarthritis of left ankle, unspecified osteoarthritis type  -     MRI ankle/heel left  wo contrast; Future  -     Ambulatory Referral to Orthopedic Surgery; Future    Obtained x-ray of the left ankle today showing degenerative changes in the medial aspect of the tibiotalar joint.  He also seems to be tender over the posterior tibial tendon with reproduction of pain with strength testing.  He does have a history of multiple injuries of the left ankle and instability symptoms however he has not experienced that in quite a while for his normal activities and running.  Will obtain an MRI given chronicity of symptoms refer to orthopedic foot and ankle specialist to discuss further treatment options.  Will hold off on formal physical therapy as he has participated in this in the past and religiously does home exercises.    Reviewed outside ortho notes, Last note from [facility]: \"if not making progress, consider injection vs surgical consult for reconstruction. \"    Return if symptoms worsen or fail to improve.      Subjective:   Patient ID: Enrique Dow is a 44 y.o. male.    NP presents to establish for acute on chronic left ankle pain.  Recently he noted sharp medial left ankle pain and swelling upon pivoting at home.  Chronic ankle pains previously treated with outside orthopedists s/p MRI and physical therapy, he performs home exercises religiously        Review of Systems    The following portions of the patient's chart were reviewed and updated as appropriate:   Allergy:  No Known " "Allergies    Medications:    Current Outpatient Medications:     aspirin (ECOTRIN LOW STRENGTH) 81 mg EC tablet, Take 81 mg by mouth daily, Disp: , Rfl:     cholecalciferol (VITAMIN D3) 400 units tablet, Take 400 Units by mouth daily, Disp: , Rfl:     fish oil-omega-3 fatty acids 1000 MG capsule, Take 2 g by mouth daily, Disp: , Rfl:     methylPREDNISolone 4 MG tablet therapy pack, Use as directed on package, Disp: 21 tablet, Rfl: 0    Multiple Vitamin (MULTIVITAMIN) tablet, Take 1 tablet by mouth daily With Vitamin K2, Disp: , Rfl:     phenylephrine (JUSTA-SYNEPHRINE) 0.5 % nasal spray, 1 spray by Each Nare route every 4 (four) hours as needed for congestion, Disp: 15 mL, Rfl: 0    Probiotic Product (SOLUBLE FIBER/PROBIOTICS PO), Take 1 capsule by mouth daily, Disp: , Rfl:     rosuvastatin (CRESTOR) 10 MG tablet, Take 1 tablet (10 mg total) by mouth daily, Disp: 90 tablet, Rfl: 1    Patient Active Problem List   Diagnosis    Hyperlipidemia    Tear of lateral cartilage or meniscus of knee, current    Kidney stone    Sprain of cruciate ligament of knee    Subcutaneous mass    Achilles tendinitis    Family history of heart disease    Elevated coronary artery calcium score       Objective:  Ht 5' 11\" (1.803 m)   Wt 98 kg (216 lb)   BMI 30.13 kg/m²     Left Ankle Exam     Tenderness   The patient is experiencing tenderness in the ATF (PTT with pain reproduced with resisted inversion).     Range of Motion   The patient has normal left ankle ROM.     Muscle Strength   The patient has normal left ankle strength.    Tests   Anterior drawer: positive  Varus tilt: negative    Other   Erythema: absent  Sensation: normal  Pulse: present    Comments:  Nl arches          Strength/Myotome Testing     Left Ankle/Foot   Normal strength      Physical Exam      Neurologic Exam    Procedures    I have personally reviewed the written report of the pertinent studies.   2022  [facility]  Outside Information  Results  MRI " ANKLE/HEEL LEFT WO CONTRAST (Order [id number])     MRI ANKLE/HEEL LEFT WO CONTRAST  Order: [id number]  Impression    Impression: Mild degenerative changes. Fusiform enlargement of the distal  Achilles suggesting chronic tendinosis, with mild peritendinitis adjacent. No  evidence of full-thickness tear or discrete partial tear. Nonvisualization of  the anterior talofibular ligament suggesting chronic full-thickness tear. Mild  fluid adjacent to the distal talus and calcaneus with fluid in the sinus tarsi,  the possibility of sinus tarsi syndrome is considered.            Past Medical History:   Diagnosis Date    Anxiety     h/o    Calculus of ureter     Chronic tension headaches     COVID-19 07/20/2022    Kidney stone     passed on own     Kidney stones     Panic attacks     h/o    PONV (postoperative nausea and vomiting)        Past Surgical History:   Procedure Laterality Date    KNEE SURGERY Bilateral     multiple,last assessed 1/2/17    REPAIR KNEE LIGAMENT      SCALP EXCISION N/A 2/23/2018    Procedure: EXCISION SCALP MASS;  Surgeon: Hetal Lopez MD;  Location: [facility];  Service: Plastics    WISDOM TOOTH EXTRACTION      one extracted       Social History     Socioeconomic History    Marital status: /Civil Union     Spouse name: Not on file    Number of children: Not on file    Years of education: Not on file    Highest education level: Not on file   Occupational History    Not on file   Tobacco Use    Smoking status: Never    Smokeless tobacco: Never    Tobacco comments:        Vaping Use    Vaping status: Never Used   Substance and Sexual Activity    Alcohol use: Not Currently     Alcohol/week: 2.0 standard drinks of alcohol     Types: 2 Glasses of wine per week     Comment: social    Drug use: No    Sexual activity: Yes     Partners: Female     Birth control/protection: Female Sterilization, None   Other Topics Concern    Not on file   Social History Narrative    Not on file     Social Drivers of  Health     Financial Resource Strain: Not on file   Food Insecurity: Not on file   Transportation Needs: Not on file   Physical Activity: Not on file   Stress: Not on file   Social Connections: Not on file   Intimate Partner Violence: Not on file   Housing Stability: Not on file       Family History   Problem Relation Age of Onset    Other Father         arteriosclerosis of autologous arterial coronary artery bypass graft    Nephrolithiasis Father         calcium    Gout Father

## 2025-02-21 ENCOUNTER — HOSPITAL ENCOUNTER (OUTPATIENT)
Dept: MRI IMAGING | Facility: HOSPITAL | Age: 45
End: 2025-02-21
Payer: COMMERCIAL

## 2025-02-21 DIAGNOSIS — S93.492A SPRAIN OF ANTERIOR TALOFIBULAR LIGAMENT OF LEFT ANKLE, INITIAL ENCOUNTER: ICD-10-CM

## 2025-02-21 DIAGNOSIS — G89.29 CHRONIC PAIN OF LEFT ANKLE: ICD-10-CM

## 2025-02-21 DIAGNOSIS — M19.072 OSTEOARTHRITIS OF LEFT ANKLE, UNSPECIFIED OSTEOARTHRITIS TYPE: ICD-10-CM

## 2025-02-21 DIAGNOSIS — M25.572 CHRONIC PAIN OF LEFT ANKLE: ICD-10-CM

## 2025-02-21 PROCEDURE — 73721 MRI JNT OF LWR EXTRE W/O DYE: CPT

## 2025-02-26 ENCOUNTER — OFFICE VISIT (OUTPATIENT)
Dept: OBGYN CLINIC | Facility: CLINIC | Age: 45
End: 2025-02-26
Payer: COMMERCIAL

## 2025-02-26 VITALS — WEIGHT: 212 LBS | BODY MASS INDEX: 30.35 KG/M2 | HEIGHT: 70 IN

## 2025-02-26 DIAGNOSIS — S93.492A SPRAIN OF ANTERIOR TALOFIBULAR LIGAMENT OF LEFT ANKLE, INITIAL ENCOUNTER: Primary | ICD-10-CM

## 2025-02-26 DIAGNOSIS — G89.29 CHRONIC PAIN OF LEFT ANKLE: ICD-10-CM

## 2025-02-26 DIAGNOSIS — M19.072 OSTEOARTHRITIS OF LEFT ANKLE, UNSPECIFIED OSTEOARTHRITIS TYPE: ICD-10-CM

## 2025-02-26 DIAGNOSIS — M25.572 CHRONIC PAIN OF LEFT ANKLE: ICD-10-CM

## 2025-02-26 PROCEDURE — 99204 OFFICE O/P NEW MOD 45 MIN: CPT | Performed by: ORTHOPAEDIC SURGERY

## 2025-02-26 NOTE — PROGRESS NOTES
James R Lachman, M.D.  Attending, Orthopaedic Surgery  Foot and Ankle  [facility]      Assessment:     Encounter Diagnoses   Name Primary?    Chronic pain of left ankle     Sprain of anterior talofibular ligament of left ankle, initial encounter Yes    Osteoarthritis of left ankle, unspecified osteoarthritis type               Plan:     The patient has sustained a sprain years ago of the left ATFL   He has been experiencing weakness and instability symptoms in his ankle most notably pain over his deltoid ligament  We will begin physical therapy to address the weakness and instability  He has a positive anterior drawer  Instructions given for rest, ice 20mins/hr, elevation, and Ace wrap given for compression  Work/School restrictions given      Follow up will be in 7 weeks.     James R Lachman, MD        Subjective:    Chief Complaint:    Chief Complaint   Patient presents with    Left Ankle - Pain     Has pain and swelling. On medial side has the pain. Has a little tenderness. Has the MRI done.         Enrique Dow is a 44 y.o. male referred by Dr. Garsia for evaluation and treatment of an injury to the left ankle. This is evaluated as a  historic  injury with subsequent pain. Multiple injuries over the past 10 years, athletic and otherwise, and occurred while playing soccer.  The patient states the ankle rolled inward at the time of injury.  He did not hear or sense a pop or snap at the time of the injury. The patient notes pain and moderate swelling of the ankle since the injury. He has treated the ankle with Elevation, Rest. Pain is localized to the medial  malleolar area. He has sprained this ankle in the past.    Pain/symptom location: the left ankle and foot  Pain/symptom quality: dull  Pain/symptom severity: intermittent  Pain/symptom timing:  Worse during the day when active  Pain/symptom conext:  Worse with activites and work  Pain/symptom modifying factors:  Rest makes  "better, activities make worse.  Pain/symptom associated signs/symptoms: none    Outside reports reviewed: none.    Foot and Ankle Surgical History:   None    Past Medical, Surgical and Social History:  Past Medical History:  has a past medical history of Anxiety, Calculus of ureter, Chronic tension headaches, COVID-19 (07/20/2022), Kidney stone, Kidney stones, Panic attacks, and PONV (postoperative nausea and vomiting).  Problem List: does not have any pertinent problems on file.  Past Surgical History:  has a past surgical history that includes Knee surgery (Bilateral); [place] tooth extraction; SCALP EXCISION (N/A, 2/23/2018); and Repair knee ligament.  Family History: family history includes Gout in his father; Nephrolithiasis in his father; Other in his father.  Social History:  reports that he has never smoked. He has never used smokeless tobacco. He reports that he does not currently use alcohol after a past usage of about 2.0 standard drinks of alcohol per week. He reports that he does not use drugs.  Current Medications: has a current medication list which includes the following prescription(s): aspirin, cholecalciferol, fish oil-omega-3 fatty acids, methylprednisolone, multivitamin, phenylephrine, probiotic product, and rosuvastatin.  Allergies: has no known allergies.     Review of Systems:  General- denies fever/chills  HEENT- denies hearing loss or sore throat  Eyes- denies eye pain or visual disturbances, denies red eyes  Respiratory- denies cough or SOB  Cardio- denies chest pain or palpitations  GI- denies abdominal pain  Endocrine- denies urinary frequency  Urinary- denies pain with urination  Musculoskeletal- Negative except noted above  Skin- denies rashes or wounds  Neurological- denies dizziness or headache  Psychiatric- denies anxiety or difficulty concentrating    Objective:        Ht 5' 10\" (1.778 m)   Wt 96.2 kg (212 lb)   BMI 30.42 kg/m²   General/Constitutional: No apparent distress: " well-nourished and well developed.  Eyes: normal ocular motion  Lymphatic: No appreciable lymphadenopathy  Respiratory: Non-labored breathing  Vascular: No edema, swelling or tenderness, except as noted in detailed exam.  Integumentary: No impressive skin lesions present, except as noted in detailed exam.  Neuro: No ataxia or abnormal movements  Psych: Normal mood and affect, oriented to person, place and time.  MSK: normal other than stated in HPI and exam      Gait:  Normal. The patient can bear weight on the injured extremity.   Left Ankle  Proximal Fibula:   no tenderness noted   Edema:   Moderate swelling circumferentially in the ankle   Ecchymosis:   Moderate in lateral ankle   Crepitus  None   Active ROM:  100% of normal    Passive ROM:   100% of normal    Palpation:  Significant tenderness of the anterolateral ligaments   Stability.:   Positive anterior drawer   Syndosmosis:   syndesmotic ligament IS NOT tender   Sensation:     Intact in all distributions   Pulses:  normal DP and PT pulses       Imaging  X-ray of the ankle: 3 views of the ankle reveal a stable mortise joint, moderate soft tissue swelling, and no evidence of fracture. Reviewed by me personally.    MRI left ankle shows complete tear of the ATFL with noninsertional achilles tendinosis. Reviewed by me personally.    I personally performed this service.  James R Lachman, MD

## 2025-03-03 ENCOUNTER — EVALUATION (OUTPATIENT)
Dept: PHYSICAL THERAPY | Facility: MEDICAL CENTER | Age: 45
End: 2025-03-03
Attending: ORTHOPAEDIC SURGERY
Payer: COMMERCIAL

## 2025-03-03 DIAGNOSIS — S93.492A SPRAIN OF ANTERIOR TALOFIBULAR LIGAMENT OF LEFT ANKLE, INITIAL ENCOUNTER: Primary | ICD-10-CM

## 2025-03-03 PROCEDURE — 97161 PT EVAL LOW COMPLEX 20 MIN: CPT

## 2025-03-03 PROCEDURE — 97112 NEUROMUSCULAR REEDUCATION: CPT

## 2025-03-03 NOTE — PROGRESS NOTES
PT Evaluation     Today's date: 3/3/2025  Patient name: Enrique Dow  : 1980  MRN: [id number]  Referring provider: Lachman, James R, MD  Dx:   Encounter Diagnosis     ICD-10-CM    1. Sprain of anterior talofibular ligament of left ankle, initial encounter  S93.492A Ambulatory Referral to Physical Therapy                     Assessment  Impairments: abnormal muscle firing, abnormal muscle tone, abnormal or restricted ROM, abnormal movement, activity intolerance, impaired physical strength, lacks appropriate home exercise program and pain with function    Assessment details: Enrique Dow  is a pleasant 44 y.o. male who presents with L ankle pain.  The primary movement problem is ankle lateral instability resulting in strength deficit, balance deficit, poor motor coordination, which limit his ability to perform ADLs, IADLs and recreational activity.  No referral is necessary at this time based on examination results.   The patient's greatest concerns is not being able to stay active.     Problem List:  1) ankle lateral instability  2) strength deficit  3) poor motor coordination    Etiologic factors include chronic sprain.  Pt. will benefit from skilled PT services that includes manual therapy techniques to enhance tissue extensibility, neuromuscular re-education to facilitate motor control, therapeutic exercise to increase functional mobility, and modalities prn to reduce pain and inflammation.  Understanding of Dx/Px/POC: good     Prognosis: good  Prognosis details: Positive prognostic indicators: motivation to improve   Negative prognostic indicators: chronicity of symptoms    Goals  Impairment Goals  - Pt I with initial HEP in 1-2 visits  - Improve ROM equal to contralateral side in 4-6 weeks  - Increase strength to 5/5 in all affected areas in 4-6 weeks    Functional Goals  - Increase Functional Status Measure to: 89 in 6-8 weeks  - Patient will be independent with comprehensive HEP in 6-8 weeks  -  Ambulation is improved to prior level of function in 6-8 weeks  - Stair climbing is improved to prior level of function in 6-8 weeks  - Squatting is improved to prior level of function in 6-8 weeks  - Running is improved to PLOF in 6-8 weeks       Plan  Patient would benefit from: skilled physical therapy  Planned modality interventions: low level laser therapy, TENS and cryotherapy    Planned therapy interventions: joint mobilization, manual therapy, massage, neuromuscular re-education, patient education, postural training, strengthening, stretching, therapeutic activities, therapeutic exercise, flexibility, functional ROM exercises, graded exercise, home exercise program, IASTM, kinesiology taping and Long taping    Treatment plan discussed with: patient  Plan details: Prognosis above is given PT services 2x/week tapering to 1x/week over the next 2 months and home program adherence.        Subjective Evaluation    History of Present Illness  Mechanism of injury: Enrique Dow presents with c/c of L ankle pain. Pt has hx of achilles tendon issue and torn ATFL from sprain. Went on 7 mile run about 2.5 weeks ago. Standing in the kitchen he pivoted weird and had a sharp pain. He reports it slowly got more painful and more swollen. The next day he could not bear weight on it.   Aggravating factors: walking downhill  Relieving factors: rest, ice  24hr pain pattern: 0/10 (current), 0/10 (best), 10/10 (worst), location: medial ankle, descriptors: sharp, tenderness  Imaging: MRI  Previous treatments: PT  Occupation/recreation: strength training, cardio/running, soccer  Primary concern: not being able to stay active  Patient goals: avoid surgery, get back to normal          Objective     Tenderness   Left Ankle/Foot   Tenderness in the deltoid ligament.     Active Range of Motion   Left Ankle/Foot   Dorsiflexion (kf): 35 degrees   Plantar flexion: WFL  Inversion: WFL  Eversion: WFL    Right Ankle/Foot   Dorsiflexion  (kf): 30 degrees   Plantar flexion: WFL  Inversion: WFL  Eversion: WFL    Joint Play   Left Ankle/Foot  Joints within functional limits are the midfoot. Hypermobile in the talocrural joint and subtalar joint.     Right Ankle/Foot  Joints within functional limits are the talocrural joint, subtalar joint and midfoot.     Comments  Left subtalar joint comments: Inversion   .      Strength/Myotome Testing     Left Ankle/Foot   Dorsiflexion: 5  Plantar flexion: 5  Inversion: 4  Eversion: 4    Right Ankle/Foot   Normal strength    Tests   Left Ankle/Foot   Positive for anterior drawer.              Precautions: b/l knee surgeries    HEP: CR for Post tib, bridge the gap, side steps  Manuals 3/3                                                                Neuro Re-Ed             Balance progression             Hip abd/ext             Tap down              CR/TR                                       HEP review and pt education 25'            Ther Ex             TM             Side steps             Monster walks                                                                              Ther Activity                                       Gait Training                                       Modalities

## 2025-03-13 ENCOUNTER — OFFICE VISIT (OUTPATIENT)
Dept: PHYSICAL THERAPY | Facility: MEDICAL CENTER | Age: 45
End: 2025-03-13
Attending: ORTHOPAEDIC SURGERY
Payer: COMMERCIAL

## 2025-03-13 DIAGNOSIS — S93.492A SPRAIN OF ANTERIOR TALOFIBULAR LIGAMENT OF LEFT ANKLE, INITIAL ENCOUNTER: Primary | ICD-10-CM

## 2025-03-13 PROCEDURE — 97110 THERAPEUTIC EXERCISES: CPT

## 2025-03-13 PROCEDURE — 97112 NEUROMUSCULAR REEDUCATION: CPT

## 2025-03-13 NOTE — PROGRESS NOTES
Daily Note     Today's date: 3/13/2025  Patient name: Enrique Dow  : 1980  MRN: [id number]  Referring provider: Lachman, James R, MD  Dx:   Encounter Diagnosis     ICD-10-CM    1. Sprain of anterior talofibular ligament of left ankle, initial encounter  S93.492A                      Subjective: Pt reports that he has been doing his stuff at home .       Objective: See treatment diary below      Assessment: POC initiated. No adverse effects noted. Tolerated treatment well. Patient demonstrated fatigue post treatment, exhibited good technique with therapeutic exercises, and would benefit from continued PT      Plan: Continue per plan of care.      Precautions: b/l knee surgeries    HEP: CR for Post tib, bridge the gap, side steps  Manuals 3/13                                                                Neuro Re-Ed             Balance progression Fitter board AP/LAT 3x30s    Tramp x30 lat            Lateral lunge 3x10 15#            Hip abd/ext             Tap down              CR/TR                                       HEP review and pt education             Ther Ex             TM Fw/lat 10'            Side steps 3 laps gtb            Monster walks 3 laps gtb            Quad rocks With heel float 3x8            lunges On slant board pro/sup 3x10            LP Heel float SL 3x15 90#                                      Ther Activity                                       Gait Training                                       Modalities

## 2025-03-20 ENCOUNTER — OFFICE VISIT (OUTPATIENT)
Dept: PHYSICAL THERAPY | Facility: MEDICAL CENTER | Age: 45
End: 2025-03-20
Attending: ORTHOPAEDIC SURGERY
Payer: COMMERCIAL

## 2025-03-20 DIAGNOSIS — S93.492A SPRAIN OF ANTERIOR TALOFIBULAR LIGAMENT OF LEFT ANKLE, INITIAL ENCOUNTER: Primary | ICD-10-CM

## 2025-03-20 PROCEDURE — 97110 THERAPEUTIC EXERCISES: CPT

## 2025-03-20 PROCEDURE — 97112 NEUROMUSCULAR REEDUCATION: CPT

## 2025-03-20 NOTE — PROGRESS NOTES
Daily Note     Today's date: 3/20/2025  Patient name: Enrique Dow  : 1980  MRN: [id number]  Referring provider: Lachman, James R, MD  Dx:   Encounter Diagnosis     ICD-10-CM    1. Sprain of anterior talofibular ligament of left ankle, initial encounter  S93.492A                      Subjective: Pt reports that he his ankle feels okay.       Objective: See treatment diary below      Assessment: Continued with POC. Tolerated treatment well. Patient demonstrated fatigue post treatment, exhibited good technique with therapeutic exercises, and would benefit from continued PT      Plan: Continue per plan of care.      Precautions: b/l knee surgeries    HEP: CR for Post tib, bridge the gap, side steps  Manuals 3/20                                                                Neuro Re-Ed             Balance progression Fitter board AP/LAT 3x30s    Tramp x30 lat            Lateral lunge x10 10# matt             Hip abd/ext             Tap down              CR/TR                                       HEP review and pt education             Ther Ex             TM Fw/lat 10'            Side steps 3 laps gtb            Monster walks 3 laps gtb            Quad rocks On BOSU  3x8            lunges On slant board pro/sup 3x10            LP Heel float SL 3x15 90#                                      Ther Activity                                       Gait Training                                       Modalities

## 2025-03-27 ENCOUNTER — APPOINTMENT (OUTPATIENT)
Dept: PHYSICAL THERAPY | Facility: MEDICAL CENTER | Age: 45
End: 2025-03-27
Attending: ORTHOPAEDIC SURGERY
Payer: COMMERCIAL

## 2025-04-03 ENCOUNTER — OFFICE VISIT (OUTPATIENT)
Dept: PHYSICAL THERAPY | Facility: MEDICAL CENTER | Age: 45
End: 2025-04-03
Attending: ORTHOPAEDIC SURGERY
Payer: COMMERCIAL

## 2025-04-03 DIAGNOSIS — S93.492A SPRAIN OF ANTERIOR TALOFIBULAR LIGAMENT OF LEFT ANKLE, INITIAL ENCOUNTER: Primary | ICD-10-CM

## 2025-04-03 PROCEDURE — 97112 NEUROMUSCULAR REEDUCATION: CPT

## 2025-04-03 PROCEDURE — 97110 THERAPEUTIC EXERCISES: CPT

## 2025-04-03 NOTE — PROGRESS NOTES
Daily Note     Today's date: 4/3/2025  Patient name: Enrique Dow  : 1980  MRN: [id number]  Referring provider: Lachman, James R, MD  Dx:   Encounter Diagnosis     ICD-10-CM    1. Sprain of anterior talofibular ligament of left ankle, initial encounter  S93.492A                      Subjective: Pt reports that he his ankle doesn't hurt.       Objective: See treatment diary below      Assessment: Continued with POC. Tolerated treatment well. Patient demonstrated fatigue post treatment, exhibited good technique with therapeutic exercises, and would benefit from continued PT      Plan: Continue per plan of care.      Precautions: b/l knee surgeries    HEP: CR for Post tib, bridge the gap, side steps  Manuals 4/3                                                                Neuro Re-Ed             Balance progression Fitter board AP/LAT 3x30s    Tramp x30 lat            Lateral lunge 3x10 10# matt             Heel float On airex 3x ALAP            Hip abd/ext             Tap down              CR/TR                                       HEP review and pt education             Ther Ex             TM Fw/lat 10'            Side steps 3 laps btb            Monster walks 3 laps btb            Quad rocks On BOSU  3x8            lunges On slant board pro/sup 3x10            LP Heel float SL 3x15 90#                                      Ther Activity                                       Gait Training                                       Modalities

## 2025-04-10 ENCOUNTER — APPOINTMENT (OUTPATIENT)
Dept: PHYSICAL THERAPY | Facility: MEDICAL CENTER | Age: 45
End: 2025-04-10
Attending: ORTHOPAEDIC SURGERY
Payer: COMMERCIAL

## 2025-04-17 ENCOUNTER — APPOINTMENT (OUTPATIENT)
Dept: PHYSICAL THERAPY | Facility: MEDICAL CENTER | Age: 45
End: 2025-04-17
Attending: ORTHOPAEDIC SURGERY
Payer: COMMERCIAL

## 2025-05-27 DIAGNOSIS — Z82.49 FAMILY HISTORY OF HEART DISEASE: ICD-10-CM

## 2025-05-27 DIAGNOSIS — E78.5 HYPERLIPIDEMIA, UNSPECIFIED HYPERLIPIDEMIA TYPE: ICD-10-CM

## 2025-05-27 NOTE — TELEPHONE ENCOUNTER
----- Message from Evy ANTUNEZ sent at 5/27/2025  2:36 PM EDT -----  Regarding: FW: Angela hernandezdirk appt  Spoke to pt states he doesn't need to come back advised pt to f/u for refills with PCP  ----- Message -----  From: Samaria Harper MA  Sent: 5/27/2025   2:18 PM EDT  To: [facility]  Subject: Angela troncoso                                Good Afternoon,           The clinical team received fax from [facility] pharmacy stating that the patient is needing refill for his rosuvastatin. The patient was last seen in 2023 by Dr. Noe, the patient was supposed to follow up 2 months later and never did. Please reach out to patient to schedule overdue return. Rx will be sent to provider for him to either approve or not. Thanks!

## 2025-05-27 NOTE — TELEPHONE ENCOUNTER
Office received notification from [facility] pharmacy stating that the patient is needing refill for his rosuvastatin. The patient was last seen in 2023. Sent message to clerical team to reach out to patient and schedule appt. The patient is a patient of Dr. Neo.

## 2025-05-28 RX ORDER — ROSUVASTATIN CALCIUM 10 MG/1
10 TABLET, COATED ORAL DAILY
Qty: 90 TABLET | Refills: 1 | Status: SHIPPED | OUTPATIENT
Start: 2025-05-28

## 2025-06-16 ENCOUNTER — TELEPHONE (OUTPATIENT)
Age: 45
End: 2025-06-16

## 2025-06-16 NOTE — TELEPHONE ENCOUNTER
Patient called and stated his daughter works in a nursing home and was exposed to tuberculosis and tested positive for it. Patient would like to be tested as well since he has been around his daughter.

## 2025-06-18 NOTE — TELEPHONE ENCOUNTER
Spoke with pt. Stated that the daughter is going to get retested and if she comes positive again they will come in for an appt.

## (undated) DEVICE — UNDYED MONOFILAMENT (POLYDIOXANONE), ABSORBABLE SURGICAL SUTURE: Brand: PDS

## (undated) DEVICE — SKIN MARKER DUAL TIP WITH RULER CAP, FLEXIBLE RULER AND LABELS: Brand: DEVON

## (undated) DEVICE — GLOVE SRG BIOGEL 6.5

## (undated) DEVICE — 2000CC GUARDIAN II: Brand: GUARDIAN

## (undated) DEVICE — SPONGE STICK WITH PVP-I: Brand: KENDALL

## (undated) DEVICE — SYRINGE 10ML LL

## (undated) DEVICE — GLOVE SRG BIOGEL 7.5

## (undated) DEVICE — STRL UNIVERSAL MINOR GENERAL: Brand: CARDINAL HEALTH

## (undated) DEVICE — STRAIGHT MICRO-NEEDLE ELECTRODE: Brand: VALLEYLAB

## (undated) DEVICE — 10FR FRAZIER SUCTION HANDLE: Brand: CARDINAL HEALTH

## (undated) DEVICE — ADHESIVE SKN CLSR HISTOACRYL FLEX 0.5ML LF

## (undated) DEVICE — INTENDED FOR TISSUE SEPARATION, AND OTHER PROCEDURES THAT REQUIRE A SHARP SURGICAL BLADE TO PUNCTURE OR CUT.: Brand: BARD-PARKER ® CARBON RIB-BACK BLADES

## (undated) DEVICE — GLOVE INDICATOR PI UNDERGLOVE SZ 7.5 BLUE

## (undated) DEVICE — SCD SEQUENTIAL COMPRESSION COMFORT SLEEVE MEDIUM KNEE LENGTH: Brand: KENDALL SCD

## (undated) DEVICE — NEEDLE 25G X 1 1/2

## (undated) DEVICE — GLOVE INDICATOR PI UNDERGLOVE SZ 7 BLUE

## (undated) DEVICE — GLOVE SRG BIOGEL 7

## (undated) DEVICE — NEEDLE 18 G X 1 1/2

## (undated) DEVICE — STANDARD SURGICAL GOWN, L: Brand: CONVERTORS

## (undated) DEVICE — TUBING SUCTION 5MM X 12 FT